# Patient Record
Sex: MALE | Race: WHITE
[De-identification: names, ages, dates, MRNs, and addresses within clinical notes are randomized per-mention and may not be internally consistent; named-entity substitution may affect disease eponyms.]

---

## 2019-01-30 ENCOUNTER — HOSPITAL ENCOUNTER (EMERGENCY)
Dept: HOSPITAL 97 - ER | Age: 79
Discharge: HOME | End: 2019-01-30
Payer: COMMERCIAL

## 2019-01-30 DIAGNOSIS — I10: ICD-10-CM

## 2019-01-30 DIAGNOSIS — Z79.82: ICD-10-CM

## 2019-01-30 DIAGNOSIS — R31.9: Primary | ICD-10-CM

## 2019-01-30 LAB
BLD SMEAR INTERP: (no result)
HCT VFR BLD CALC: 46 % (ref 39.6–49)
LYMPHOCYTES # SPEC AUTO: 1.2 K/UL (ref 0.7–4.9)
MACROCYTES BLD QL: (no result)
MORPHOLOGY BLD-IMP: (no result)
PMV BLD: 9.4 FL (ref 7.6–11.3)
RBC # BLD: 4.35 M/UL (ref 4.33–5.43)
UA COMPLETE W REFLEX CULTURE PNL UR: (no result)
UA DIPSTICK W REFLEX MICRO PNL UR: (no result)

## 2019-01-30 PROCEDURE — 85025 COMPLETE CBC W/AUTO DIFF WBC: CPT

## 2019-01-30 PROCEDURE — 81015 MICROSCOPIC EXAM OF URINE: CPT

## 2019-01-30 PROCEDURE — 99283 EMERGENCY DEPT VISIT LOW MDM: CPT

## 2019-01-30 PROCEDURE — 36415 COLL VENOUS BLD VENIPUNCTURE: CPT

## 2019-01-30 PROCEDURE — 87086 URINE CULTURE/COLONY COUNT: CPT

## 2019-01-30 PROCEDURE — 81003 URINALYSIS AUTO W/O SCOPE: CPT

## 2019-01-30 PROCEDURE — 87088 URINE BACTERIA CULTURE: CPT

## 2019-01-30 NOTE — XMS REPORT
Clinical Summary

 Created on:2019



Patient:Beverly Santacruz

Sex:Male

:1940

External Reference #:[id number]





Demographics







 Address  [address] 



   [place] [address]

 

 Home Phone  [phone]

 

 Email Address  [email or web address]

 

 Preferred Language  English

 

 Marital Status  

 

 Scientology Affiliation  Unknown

 

 Race  White

 

 Ethnic Group  Not  or 









Author







 Organization  [facility]

 

 Address  [address] [place] [address]









Support







 Name  Relationship  Address  Phone

 

 No,Contact  Unavailable  [address]   [phone]



     [place] [address]  









Care Team Providers







 Name  Role  Phone

 

 Mary Torres MD  Primary Care Provider  [phone]









Allergies

No Known Allergies



Medications







 Medication  Sig  Dispensed  Refills  Start Date  End Date  Status

 

 tamsulosin (FLOMAX)  Take 0.4 mg    0      Active



 0.4 mg  by mouth          



 capsule,extended  daily.          



 release 24hr            

 

 FOLIC  Take by mouth    0      Active



 ACID/MULTIVIT-MIN/L  daily.          



 UTEIN (CENTRUM            



 SILVER ORAL)            

 

 aspirin (ECOTRIN)  Take 81 mg by    0      Active



 81 MG enteric  mouth daily.          



 coated tablet            

 

 LACTULOSE ORAL  Take 30 mL by    0      Active



   mouth daily.          

 

 TRAVOPROST  Apply to eye    0      Active



 (TRAVATAN Z OPHT)  daily.          

 

 lactulose  GIVE 30 ML'S    3  2017    Active



 (CHRONULAC) 10  BY MOUTH          



 gram/15 mL solution  EVERY DAY          

 

 pantoprazole  Take 40 mg by    0  10/26/2017    Active



 (PROTONIX) 40 MG EC  mouth once          



 tablet  daily.          

 

 atorvastatin  TAKE 1 TABLET  90 tablet  3  2018    Active



 (LIPITOR) 40 MG  EVERY DAY          



 tabletIndications:            



 Pacemaker, Chronic            



 systolic congestive            



 heart failure (HCC)            

 

 clopidogrel  Take 1 tablet  90 tablet  0  10/31/2018    Active



 (PLAVIX) 75 mg  (75 mg total)          



 tabletIndications:  by mouth          



 Pacemaker, Chronic  daily.          



 systolic congestive            



 heart failure (HCC)            

 

 metoprolol  TAKE 1 TABLET  90 tablet  0  2018    Active



 succinate XL  BY MOUTH          



 (TOPROL-XL) 100 mg  EVERY DAY          



 24 hr            



 tabletIndications:            



 Essential            



 hypertension            

 

 niacin (NIASPAN)  Take 1 tablet  60 tablet  0  2018    Active



 1000 MG CR tablet  (1,000 mg          



   total) by          



   mouth 2 (two)          



   times a day.          

 

 niacin (NIASPAN)  TAKE 1 TABLET  60 tablet  0  2019    Active



 1000 MG CR tablet  BY MOUTH          



   TWICE A DAY          

 

 clopidogrel  Take 1 tablet  90 tablet  3  02/10/2017  02/15/2018  Discontinued



 (PLAVIX) 75 mg  (75 mg total)          



 tabletIndications:  by mouth once          



 Pacemaker, Chronic  daily.          



 systolic congestive            



 heart failure (HCC)            

 

 atorvastatin  Take 1 tablet  90 tablet  3  02/10/2017  2018  Discontinued



 (LIPITOR) 40 MG  (40 mg total)          



 tabletIndications:  by mouth once          



 Pacemaker, Chronic  daily.          



 systolic congestive            



 heart failure (HCC)            

 

 niacin (NIASPAN)  Take 1 tablet  180 tablet  0  2017  



 1000 MG CR  (1,000 mg          



 tabletIndications:  total) by          



 Essential  mouth 2 (two)          



 hypertension  times a day.          

 

 metoprolol  Take 1 tablet  90 tablet  3  2017  Discontinued



 succinate XL  (100 mg          



 (TOPROL XL) 100 mg  total) by          



 24 hr  mouth daily.          



 tabletIndications:            



 Essential            



 hypertension            

 

 clopidogrel  TAKE 1 TABLET  90 tablet  3  02/15/2018  10/31/2018  Discontinued



 (PLAVIX) 75 mg  EVERY DAY          



 tabletIndications:            



 Pacemaker, Chronic            



 systolic congestive            



 heart failure (HCC)            

 

 metoprolol  TAKE 1 TABLET  90 tablet  3  2018  Discontinued



 succinate XL  BY MOUTH          



 (TOPROL-XL) 100 mg  EVERY DAY          



 24 hr            



 tabletIndications:            



 Essential            



 hypertension            

 

 niacin (NIASPAN  Take 1 tablet  180 tablet  0  2018  10/04/2018  
Discontinued



 EXTENDED-RELEASE)  (1,000 mg          



 1000 MG CR tablet  total) by          



   mouth 2 (two)          



   times a day.          

 

 niacin (NIASPAN)  TAKE 1 TABLET  180 tablet  0  10/05/2018  10/09/2018  
Discontinued



 1000 MG CR tablet  BY MOUTH          



   TWICE A DAY          

 

 niacin (NIASPAN)  Take 1 tablet  60 tablet  0  10/09/2018  2018  
Discontinued



 1000 MG CR tablet  (1,000 mg          



   total) by          



   mouth 2 (two)          



   times a day.          







Active Problems







 Problem  Noted Date

 

 Chronic systolic congestive heart failure  2017

 

 Coronary artery disease involving native coronary artery of native heart  



 without angina pectoris  

 

 Stented coronary artery  2017

 

 AICD (automatic cardioverter/defibrillator) present  2017







Encounters







 Date  Type  Specialty  Care Team  Description

 

 2018  Refill  Cardiology  [place] Randhawa MD  Med Refill

 

 2018  Orders Only  Cardiology  Ivan Campbell, MA  

 

 2018  Refill  Cardiology  [place] Randhawa MD  Med Refill

 

 2018  Orders Only  Cardiology  Ivan aCmpbell, MA  Pacemaker;



         Chronic systolic congestive heart failure (HCC)

 

 10/31/2018  Orders Only  Cardiology  Ivan Campbell, MA  Pacemaker;



         Chronic systolic congestive heart failure (HCC)

 

 10/09/2018  Refill  Cardiology  Noemí Zambrano MA  Med Refill

 

 10/04/2018  Refill  Cardiology  [place] Randhawa MD  Med Refill

 

 10/02/2018  Orders Only  Cardiology  Ivan Campbell, MA  Essential hypertension

 

 2018  Orders Only  Cardiology  Noemí Zambrano MA  

 

 2018  Refill  Cardiology  [place] Randhawa MD  Med Refill

 

 2018  Refill  Cardiology  [place] Randhawa MD  Med Refill

 

 02/15/2018  Refill  Cardiology  [place] Randhawa MD  Med Refill



after 2018



Social History







 Tobacco Use  Types  Packs/Day  Years Used  Date

 

 Never Smoker        









 Alcohol Use  Drinks/Week  oz/Week  Comments

 

 Yes  1 Cans of beer  0.6  









 Sex Assigned at Birth  Date Recorded

 

 Not on file  









 Job Start Date  Occupation  Industry

 

 Not on file  Not on file  Not on file









 Travel History  Travel Start  Travel End









 No recent travel history available.







Last Filed Vital Signs

Not on file



Plan of Treatment







 Health Maintenance  Due Date  Last Done  Comments

 

 SHINGLES VACCINES (1 of 2)  1992    

 

 PNEUMOCOCCAL POLYSACCHARIDE VACCINE AGE 65 AND OVER  2007    

 

 PNEUMOCOCCAL-13  2007    

 

 INFLUENZA VACCINE  2018    







Results

Not on fileafter 2018



Insurance







 Payer  Benefit Plan / Group  Subscriber ID  Type  Phone  Address

 

 MEDICARE  MEDICARE PART A AND B  xxxxxxxxxx  Medicare [place], [place]

 

 AETNA  AETNA HMO,POS,EPO, MC/EC  xxxxxxxxx  HMO    









 Guarantor Name  Account Type  Relation to  Date of  Phone  Billing



     Patient  Birth    Address

 

 Beverly Santacruz  Personal/Family  Self  1940  [phone]  [address]        (Home)  [place] 







           [place] [address]







Advance Directives

Patient has advance care planning documents on file. For more information, 
please contact:[place] [address]

## 2019-01-30 NOTE — EDPHYS
Physician Documentation                                                                           

 [facility]                                                                

Name: Beverly Santacruz                                                                                 

Age: 79 yrs                                                                                       

Sex: Male                                                                                         

: 1940                                                                                   

MRN: [id number]                                                                                   

Arrival Date: 2019                                                                          

Time: 10:18                                                                                       

Account#: [id number]                                                                            

Bed 14                                                                                            

Private MD: Casi Donnelly, A                                                                    

ED Physician Chetan Collins                                                                    

HPI:                                                                                              

                                                                                             

12:39 This 79 yrs old  Male presents to ER via Ambulatory with complaints of BLOOD   ma2 

      IN URINE.                                                                                   

12:39 The patient presents with hematuria. Onset: The symptoms/episode began/occurred         ma2 

      gradually, 1 day(s) ago. Associated signs and symptoms: Pertinent negatives: abdominal      

      pain, constipation, diarrhea, fever, nausea. Severity of symptoms: At their worst the       

      symptoms were mild, in the emergency department the symptoms are unchanged. The patient     

      has not experienced similar symptoms in the past.                                           

                                                                                                  

Historical:                                                                                       

- Allergies:                                                                                      

10:36 No Known Allergies;                                                                     sv  

- Home Meds:                                                                                      

11:14 clopidogrel 75 mg oral tab 1 tab once daily [Active]; atorvastatin 40 mg oral tab 1 tab aj  

      once daily [Active]; metoprolol tartrate 100 mg Oral tab 1 tab once daily [Active];         

      tamsulosin 0.4 mg oral cp24 1 cap once daily [Active]; pantoprazole 40 mg oral TbEC 1       

      tab once daily [Active]; aspirin 81 mg Oral chew 1 tab once daily [Active]; Lactulose       

      Oral [Active];                                                                              

- PMHx:                                                                                           

10:36 Hypertension;                                                                           sv  

- PSHx:                                                                                           

10:36 Knee surgery; facial; back;                                                             sv  

                                                                                                  

- Immunization history:: Adult Immunizations up to date.                                          

- Social history:: Patient/guardian denies using alcohol, street drugs, The patient               

  lives with family, Smoking status: Patient/guardian denies using tobacco.                       

- Ebola Screening: : Patient negative for fever greater than or equal to 101.5 degrees            

  Fahrenheit, and additional compatible Ebola Virus Disease symptoms Patient denies               

  exposure to infectious person Patient denies travel to an Ebola-affected area in the            

  21 days before illness onset No symptoms or risks identified at this time.                      

- Family history:: not pertinent.                                                                 

- Hospitalizations: : No recent hospitalization is reported.                                      

                                                                                                  

                                                                                                  

ROS:                                                                                              

12:39 Constitutional: Negative for fever, chills, and weight loss.                            ma2 

12:39 : Positive for hematuria, Negative for urinary symptoms, small amounts, flank pain,       

      burning with urination, bladder incontinence, foul smelling urine.                          

12:39 All other systems are negative.                                                             

                                                                                                  

Exam:                                                                                             

12:39 Constitutional:  This is a well developed, well nourished patient who is awake, alert,  ma2 

      and in no acute distress. Chest/axilla:  Normal chest wall appearance and motion.           

      Nontender with no deformity.  No lesions are appreciated. Cardiovascular:  Regular rate     

      and rhythm with a normal S1 and S2.  No gallops, murmurs, or rubs.  Normal PMI, no JVD.     

       No pulse deficits. Respiratory:  Lungs have equal breath sounds bilaterally, clear to      

      auscultation and percussion.  No rales, rhonchi or wheezes noted.  No increased work of     

      breathing, no retractions or nasal flaring. Abdomen/GI:  Soft, non-tender, with normal      

      bowel sounds.  No distension or tympany.  No guarding or rebound.  No evidence of           

      tenderness throughout. Male :  Normal genitalia with no discharge or lesions. MS/         

      Extremity:  Pulses equal, no cyanosis.  Neurovascular intact.  Full, normal range of        

      motion. Neuro:  Awake and alert, GCS 15, oriented to person, place, time, and               

      situation.  Cranial nerves II-XII grossly intact.  Motor strength 5/5 in all                

      extremities.  Sensory grossly intact.  Cerebellar exam normal.  Normal gait.                

                                                                                                  

Vital Signs:                                                                                      

10:34  / 77; Pulse 69; Resp 18; Temp 98.3; Pulse Ox 98% ; Weight 73.48 kg; Height 5 ft. sv  

      8 in. (172.72 cm); Pain 0/10;                                                               

10:34 Body Mass Index 24.63 (73.48 kg, 172.72 cm)                                             sv  

                                                                                                  

MDM:                                                                                              

10:37 Patient medically screened.                                                             ma2 

12:39 Differential diagnosis: tim hematuria hb wnl no uti here called dr. donnelly office he   ma2 

      will see him tomorrow at 8 am. Data reviewed: vital signs, nurses notes. Counseling: I      

      had a detailed discussion with the patient and/or guardian regarding: the historical        

      points, exam findings, and any diagnostic results supporting the discharge/admit            

      diagnosis, the presence of at least one elevated blood pressure reading (>120/80)           

      during this emergency department visit, the need for outpatient follow up.                  

                                                                                                  

                                                                                             

10:38 Order name: CBC with Diff; Complete Time: 12:30                                         ma2 

                                                                                             

11:02 Order name: UA; Complete Time: 12:30                                                    dh3 

                                                                                             

11:38 Order name: CBC Smear Scan; Complete Time: 12:30                                        EDMS

                                                                                             

12:02 Order name: Urine Microscopic Only                                                      EDMS

                                                                                             

12:17 Order name: Urine Culture                                                               EDMS

                                                                                             

10:38 Order name: Urine Dipstick-Ancillary (obtain specimen); Complete Time: 11:06            ma2 

                                                                                                  

Administered Medications:                                                                         

No medications were administered                                                                  

                                                                                                  

                                                                                                  

Disposition:                                                                                      

19 12:42 Discharged to Home. Impression: Hematuria, unspecified.                            

- Condition is Stable.                                                                            

- Discharge Instructions: Hematuria, Adult.                                                       

                                                                                                  

- Medication Reconciliation Form, Thank You Letter, Antibiotic Education, Prescription            

  Opioid Use form.                                                                                

- Follow up: Casi Donnelly MD; When: Tomorrow; Reason: Continuance of care.                    

- Notes: see Dr. Donnelly at 8 am tomorrow                                                           

                                                                                                  

                                                                                                  

Signatures:                                                                                       

Dispatcher [employer]                           [facility]                                                 

Kacy Ho RN RN sv Myers, Amanda, RN RN aj Smirch, Shelby, RN RN                                                      

Chetan Collins MD MD   ma2                                                  

                                                                                                  

Corrections: (The following items were deleted from the chart)                                    

12:08 10:59 UA MICROSCOPIC+U.LAB.BRZ ordered. [facility]

13:08 12:42 2019 12:42 Discharged to Home. Impression: Hematuria, unspecified.          ss  

      Condition is Stable. Forms are Medication Reconciliation Form, Thank You Letter,            

      Antibiotic Education, Prescription Opioid Use. Follow up: Casi Donnelly; When:             

      Tomorrow; Reason: Continuance of care. ma2                                                  

                                                                                                  

**************************************************************************************************

## 2019-01-30 NOTE — ER
Nurse's Notes                                                                                     

 [facility]                                                                

Name: Beverly Santacruz                                                                                 

Age: 79 yrs                                                                                       

Sex: Male                                                                                         

: 1940                                                                                   

MRN: [id number]                                                                                   

Arrival Date: 2019                                                                          

Time: 10:18                                                                                       

Account#: [id number]                                                                            

Bed 14                                                                                            

Private MD: Casi Donnelly A                                                                    

Diagnosis: Hematuria, unspecified                                                                 

                                                                                                  

Presentation:                                                                                     

                                                                                             

10:32 Presenting complaint: Patient states: hematuria started today, also reports blood in    sv  

      stool, lightheaded. Transition of care: patient was not received from another setting       

      of care. Onset of symptoms was 2019. Care prior to arrival: None.               

10:32 Method Of Arrival: Ambulatory                                                           sv  

10:32 Acuity: NAOMI 3                                                                           sv  

11:15 Risk Assessment: Do you want to hurt yourself or someone else? Patient reports no       aj  

      desire to harm self or others. Initial Sepsis Screen: Does the patient meet any 2           

      criteria? No. Patient's initial sepsis screen is negative. Does the patient have a          

      suspected source of infection? No. Patient's initial sepsis screen is negative.             

                                                                                                  

Historical:                                                                                       

- Allergies:                                                                                      

10:36 No Known Allergies;                                                                     sv  

- Home Meds:                                                                                      

11:14 clopidogrel 75 mg oral tab 1 tab once daily [Active]; atorvastatin 40 mg oral tab 1 tab aj  

      once daily [Active]; metoprolol tartrate 100 mg Oral tab 1 tab once daily [Active];         

      tamsulosin 0.4 mg oral cp24 1 cap once daily [Active]; pantoprazole 40 mg oral TbEC 1       

      tab once daily [Active]; aspirin 81 mg Oral chew 1 tab once daily [Active]; Lactulose       

      Oral [Active];                                                                              

- PMHx:                                                                                           

10:36 Hypertension;                                                                           sv  

- PSHx:                                                                                           

10:36 Knee surgery; facial; back;                                                             sv  

                                                                                                  

- Immunization history:: Adult Immunizations up to date.                                          

- Social history:: Patient/guardian denies using alcohol, street drugs, The patient               

  lives with family, Smoking status: Patient/guardian denies using tobacco.                       

- Ebola Screening: : Patient negative for fever greater than or equal to 101.5 degrees            

  Fahrenheit, and additional compatible Ebola Virus Disease symptoms Patient denies               

  exposure to infectious person Patient denies travel to an Ebola-affected area in the            

  21 days before illness onset No symptoms or risks identified at this time.                      

- Family history:: not pertinent.                                                                 

- Hospitalizations: : No recent hospitalization is reported.                                      

                                                                                                  

                                                                                                  

Screening:                                                                                        

10:49 Abuse screen: Denies threats or abuse. Denies injuries from another. Nutritional        aj  

      screening: No deficits noted. Tuberculosis screening: No symptoms or risk factors           

      identified. Fall Risk None identified.                                                      

                                                                                                  

Assessment:                                                                                       

10:49 General: Appears in no apparent distress. comfortable, Behavior is calm, cooperative,   aj  

      appropriate for age. Pain: Denies pain. Neuro: Level of Consciousness is awake, alert,      

      obeys commands, Oriented to person, place, time, situation, Appropriate for age.            

      Respiratory: Airway is patent Respiratory effort is even, unlabored, Respiratory            

      pattern is regular, symmetrical. GI: Reports bloody stool. : Reports blood in urine.      

      Derm: Skin is intact, is healthy with good turgor, Skin is pink, warm \T\ dry. normal.      

                                                                                                  

Vital Signs:                                                                                      

10:34  / 77; Pulse 69; Resp 18; Temp 98.3; Pulse Ox 98% ; Weight 73.48 kg; Height 5 ft. sv  

      8 in. (172.72 cm); Pain 0/10;                                                               

10:34 Body Mass Index 24.63 (73.48 kg, 172.72 cm)                                             sv  

                                                                                                  

ED Course:                                                                                        

10:18 Patient arrived in ED.                                                                  sb2 

10:19 Casi Donnelly MD is Private Physician.                                               sb2 

10:34 Triage completed.                                                                       sv  

10:36 Arm band placed on.                                                                     sv  

10:37 Chetan Collins MD is Attending Physician.                                           ma2 

10:44 Mary Benson, RN is Primary Nurse.                                                     aj  

10:49 Patient has correct armband on for positive identification. Bed in low position. Call   aj  

      light in reach.                                                                             

10:49 Inserted saline lock: 22 gauge in left antecubital area, using aseptic technique. Blood aj  

      collected. Missed attempt(s): 20 gauge in left forearm. Bleeding controlled, band aid       

      applied, catheter tip intact.                                                               

11:05 CBC with Diff Sent.                                                                     aj  

11:06 UA Sent.                                                                                aj  

12:41 Casi Donnelly MD is Referral Physician.                                              ma2 

13:06 No provider procedures requiring assistance completed. IV discontinued, intact,         ss  

      bleeding controlled, No redness/swelling at site. Pressure dressing applied.                

                                                                                                  

Administered Medications:                                                                         

No medications were administered                                                                  

                                                                                                  

                                                                                                  

Outcome:                                                                                          

10:49 Discharged to home                                                                      aj  

12:42 Discharge ordered by MD.                                                                ma2 

13:06 Condition: good                                                                         ss  

13:06 Discharge instructions given to patient, Instructed on discharge instructions, follow       

      up and referral plans. Demonstrated understanding of instructions, follow-up care.          

13:08 Patient left the ED.                                                                      

                                                                                                  

Signatures:                                                                                       

Kacy Ho RN RN sv Myers, Amanda, RN RN aj Smirch, Shelby, RN RN ss Alzahri, Mohammad, MD MD   ma2                                                  

Sayra Gustafson2                                                  

                                                                                                  

Corrections: (The following items were deleted from the chart)                                    

10:36 10:32 Presenting complaint: Patient states: hematuria started today, also reports blood sv  

      in stool. sv                                                                                

10:36 10:34  / 77; Pulse 69bpm; Resp 18bpm; Pulse Ox 98%; Temp 98.3F; sv                sv  

12:08 11:05 UA MICROSCOPIC+U.LAB.BRZ drawn and sent. rickie                                       EDMS

                                                                                                  

**************************************************************************************************

## 2021-02-22 ENCOUNTER — HOSPITAL ENCOUNTER (EMERGENCY)
Dept: HOSPITAL 97 - ER | Age: 81
Discharge: HOME | End: 2021-02-22
Payer: COMMERCIAL

## 2021-02-22 VITALS — DIASTOLIC BLOOD PRESSURE: 59 MMHG | SYSTOLIC BLOOD PRESSURE: 130 MMHG

## 2021-02-22 VITALS — OXYGEN SATURATION: 98 % | TEMPERATURE: 98.7 F

## 2021-02-22 DIAGNOSIS — R31.9: Primary | ICD-10-CM

## 2021-02-22 DIAGNOSIS — Z85.51: ICD-10-CM

## 2021-02-22 DIAGNOSIS — Z95.818: ICD-10-CM

## 2021-02-22 DIAGNOSIS — I10: ICD-10-CM

## 2021-02-22 DIAGNOSIS — Z79.82: ICD-10-CM

## 2021-02-22 DIAGNOSIS — R53.1: ICD-10-CM

## 2021-02-22 LAB
ALBUMIN SERPL BCP-MCNC: 3.2 G/DL (ref 3.4–5)
ALP SERPL-CCNC: 52 U/L (ref 45–117)
ALT SERPL W P-5'-P-CCNC: 33 U/L (ref 12–78)
AST SERPL W P-5'-P-CCNC: 22 U/L (ref 15–37)
BUN BLD-MCNC: 6 MG/DL (ref 7–18)
GLUCOSE SERPLBLD-MCNC: 93 MG/DL (ref 74–106)
HCT VFR BLD CALC: 37.2 % (ref 39.6–49)
INR BLD: 1.05
LYMPHOCYTES # SPEC AUTO: 0.7 K/UL (ref 0.7–4.9)
MAGNESIUM SERPL-MCNC: 1.6 MG/DL (ref 1.8–2.4)
NT-PROBNP SERPL-MCNC: 760 PG/ML (ref ?–450)
PMV BLD: 8.6 FL (ref 7.6–11.3)
POTASSIUM SERPL-SCNC: 3.5 MMOL/L (ref 3.5–5.1)
RBC # BLD: 3.89 M/UL (ref 4.33–5.43)
TROPONIN (EMERG DEPT USE ONLY): < 0.02 NG/ML (ref 0–0.04)

## 2021-02-22 PROCEDURE — 76377 3D RENDER W/INTRP POSTPROCES: CPT

## 2021-02-22 PROCEDURE — 80048 BASIC METABOLIC PNL TOTAL CA: CPT

## 2021-02-22 PROCEDURE — 93005 ELECTROCARDIOGRAM TRACING: CPT

## 2021-02-22 PROCEDURE — 83735 ASSAY OF MAGNESIUM: CPT

## 2021-02-22 PROCEDURE — 96368 THER/DIAG CONCURRENT INF: CPT

## 2021-02-22 PROCEDURE — 99285 EMERGENCY DEPT VISIT HI MDM: CPT

## 2021-02-22 PROCEDURE — 74176 CT ABD & PELVIS W/O CONTRAST: CPT

## 2021-02-22 PROCEDURE — 83880 ASSAY OF NATRIURETIC PEPTIDE: CPT

## 2021-02-22 PROCEDURE — 81003 URINALYSIS AUTO W/O SCOPE: CPT

## 2021-02-22 PROCEDURE — 85730 THROMBOPLASTIN TIME PARTIAL: CPT

## 2021-02-22 PROCEDURE — 85025 COMPLETE CBC W/AUTO DIFF WBC: CPT

## 2021-02-22 PROCEDURE — 84484 ASSAY OF TROPONIN QUANT: CPT

## 2021-02-22 PROCEDURE — 36415 COLL VENOUS BLD VENIPUNCTURE: CPT

## 2021-02-22 PROCEDURE — 85610 PROTHROMBIN TIME: CPT

## 2021-02-22 PROCEDURE — 84145 PROCALCITONIN (PCT): CPT

## 2021-02-22 PROCEDURE — 96365 THER/PROPH/DIAG IV INF INIT: CPT

## 2021-02-22 PROCEDURE — 96367 TX/PROPH/DG ADDL SEQ IV INF: CPT

## 2021-02-22 PROCEDURE — 81015 MICROSCOPIC EXAM OF URINE: CPT

## 2021-02-22 PROCEDURE — 80076 HEPATIC FUNCTION PANEL: CPT

## 2021-02-22 NOTE — XMS REPORT
Clinical Summary

                          Created on:2021



Patient:Beverly Santacruz

Sex:Male

:1940

External Reference #:[id number]





Demographics







                          Address                   [address] 



                                                    [place] [address]

 

                          Home Phone                [phone]

 

                          Mobile Phone              [phone]

 

                          Email Address             [email or web address]

 

                          Email Address             [email or web address]

 

                          Preferred Language        English

 

                          Marital Status            

 

                          Worship Affiliation     Unknown

 

                          Race                      White

 

                          Ethnic Group              Not  or 









Author







                          Organization              [facility]

 

                          Address                   [address] [place] [address]









Support







                Name            Relationship    Address         Phone

 

                Andre Santacruz     Child           [address]  [phone]





                                                [place] [address] 









Care Team Providers







                    Name                Role                Phone

 

                    Lidia Torres MD   Primary Care Provider [phone]









Allergies

No Known Active Allergies



Medications







          Medication Sig       Dispensed Refills   Start Date End Date  Status

 

          tamsulosin Take 0.4 mg by           0                             Acti

ve



          (FLOMAX) 0.4 mg mouth daily.                                         



          capsule,extended                                                   



          release 24hr                                                   

 

          FOLIC     Take 1 tablet           0                             Active



          ACID/MULTIVIT-MIN by mouth daily.                                     

    



          /LUTEIN (CENTRUM                                                   



          SILVER ORAL)                                                   

 

          TRAVOPROST Administer 1           0                             Active



          (TRAVATAN Z OPHT) drop to both                                        

 



                    eyes nightly.                                         



                    Both eyes                                         

 

          lactulose Take 30 g by           3         2017           Active



          (CHRONULAC) 10 mouth nightly.                                         



          gram/15 mL                                                   



          solution                                                    

 

          pantoprazole Take 40 mg by           0         10/26/2017           Ac

tive



          (PROTONIX) 40 MG mouth daily.                                         



          EC tablet                                                   

 

          aspirin (ECOTRIN) Take 1 tablet 30 tablet 11        10/07/2020 10/02/2

0  Active



          81 MG enteric (81 mg total)                               21        



          coated tablet by mouth every                                         



                    evening for 360                                         



                    days.                                             

 

          atorvastatin Take 1 tablet 30 tablet 11        10/07/2020 10/02/20  Ac

tive



          (LIPITOR) 40 mg (40 mg total)                               21        



          tabletIndications by mouth daily                                      

   



          : Pacemaker, for 360 days.                                         



          Chronic systolic                                                   



          congestive heart                                                   



          failure (HCC)                                                   

 

          clopidogreL Take 1 tablet 30 tablet 11        10/07/2020 10/02/20  Act

james



          (PLAVIX) 75 mg (75 mg total)                               21        



          tabletIndications by mouth daily                                      

   



          : Pacemaker, for 360 days.                                         



          Chronic systolic                                                   



          congestive heart                                                   



          failure (HCC)                                                   

 

          metoprolol TAKE 1 TABLET 90 tablet 0         2020           Acti

ve



          succinate XL BY MOUTH EVERY                                         



          (TOPROL-XL) 100 DAY                                               



          mg 24 hr                                                    



          tabletIndications                                                   



          : Essential                                                   



          hypertension                                                   

 

          aspirin (ECOTRIN) Take 81 mg by           0                   10/07/20

  Discontinued



          81 MG enteric mouth every                               20        (Reo

rder)



          coated tablet evening.                                          

 

          LACTULOSE ORAL Take 30 mL by           0                   20  D

iscontinued



                    mouth daily.                               20        

 

          niacin (NIASPAN) Take 1 tablet 60 tablet 0         2018 10/05/20

  Discontinued



          1000 MG CR tablet (1,000 mg                               20        



                    total) by mouth                                         



                    2 (two) times a                                         



                    day.                                              

 

          atorvastatin TAKE 1 TABLET 90 tablet 3         2019  Di

scontinued



          (LIPITOR) 40 MG BY MOUTH EVERY                               20       

 (Reorder)



          tabletIndications DAY                                               



          : Pacemaker,                                                   



          Chronic systolic                                                   



          congestive heart                                                   



          failure (HCC)                                                   

 

          clopidogrel TAKE 1 TABLET 90 tablet 0         2019  Dis

continued



          (PLAVIX) 75 mg BY MOUTH EVERY                               20        

(Reorder)



          tabletIndications DAY                                               



          : Pacemaker,                                                   



          Chronic systolic                                                   



          congestive heart                                                   



          failure (HCC)                                                   

 

          niacin (NIASPAN) TAKE 1 TABLET 180 tablet 0         2020

0  Discontinued



          1000 MG CR tablet BY MOUTH TWICE                               20     

   



                    A DAY                                             

 

          metoprolol Take 1 tablet 90 tablet 0         02/10/2020 05/04/20  Disc

ontinued



          succinate XL (100 mg total)                               20        



          (TOPROL-XL) 100 by mouth daily.                                       

  



          mg 24 hr                                                    



          tabletIndications                                                   



          : Essential                                                   



          hypertension                                                   

 

          atorvastatin Take 1 tablet 90 tablet 3         2020 10/07/20  Di

scontinued



          (LIPITOR) 40 MG (40 mg total)                               20        

(Reorder)



          tabletIndications by mouth daily.                                     

    



          : Pacemaker,                                                   



          Chronic systolic                                                   



          congestive heart                                                   



          failure (HCC)                                                   

 

          clopidogreL Take 1 tablet 90 tablet 0         2020  Dis

continued



          (PLAVIX) 75 mg (75 mg total)                               20        



          tabletIndications by mouth daily.                                     

    



          : Pacemaker,                                                   



          Chronic systolic                                                   



          congestive heart                                                   



          failure (HCC)                                                   

 

          niacin (NIASPAN) TAKE 1 TABLET 180 tablet 0         2020

0  Discontinued



          1000 MG CR tablet BY MOUTH TWICE                               20     

   



                    A DAY                                             

 

          metoprolol TAKE 1 TABLET 90 tablet 0         2020  Disc

ontinued



          succinate XL BY MOUTH EVERY                               20        



          (TOPROL-XL) 100 DAY                                               



          mg 24 hr                                                    



          tabletIndications                                                   



          : Essential                                                   



          hypertension                                                   

 

          niacin (NIASPAN) TAKE 1 TABLET 180 tablet 0         2020

0  Discontinued



          1000 MG CR tablet BY MOUTH TWICE                               20     

   



                    A DAY                                             

 

          clopidogreL TAKE 1 TABLET 90 tablet 0         2020 10/05/20  Dis

continued



          (PLAVIX) 75 mg BY MOUTH EVERY                               20        



          tabletIndications DAY                                               



          : Pacemaker,                                                   



          Chronic systolic                                                   



          congestive heart                                                   



          failure (HCC)                                                   

 

          metoprolol TAKE 1 TABLET 90 tablet 0         2020 10/19/20  Disc

ontinued



          succinate XL BY MOUTH EVERY                               20        



          (TOPROL-XL) 100 DAY                                               



          mg 24 hr                                                    



          tabletIndications                                                   



          : Essential                                                   



          hypertension                                                   

 

          clopidogreL TAKE 1 TABLET 90 tablet 0         10/05/2020 10/07/20  Dis

continued



          (PLAVIX) 75 mg BY MOUTH EVERY                               20        

(Reorder)



          tabletIndications DAY                                               



          : Pacemaker,                                                   



          Chronic systolic                                                   



          congestive heart                                                   



          failure (HCC)                                                   

 

          niacin (NIASPAN) Take 2 tablets 180 tablet 0         10/05/2020 10/19/

20  Discontinued



          1000 MG CR tablet (2,000 mg                               20        



                    total) by mouth                                         



                    nightly.                                          

 

          niacin (NIASPAN) TAKE 2 TABLETS 180 tablet 0         10/19/2020 11/10/

20  Discontinued



          1000 MG CR tablet (2,000 MG                               20        (F

ormulary



                    TOTAL) BY MOUTH                                         prasad

ge)



                    NIGHTLY.                                          

 

          metoprolol Take 1 tablet 90 tablet 0         10/19/2020 11/06/20  Disc

ontinued



          succinate XL (100 mg total)                               20        



          (TOPROL-XL) 100 by mouth daily.                                       

  



          mg 24 hr                                                    



          tabletIndications                                                   



          : Essential                                                   



          hypertension                                                   







Active Problems







                          Problem                   Noted Date

 

                          Cardiomyopathy            2020

 

                          Ischemic cardiomyopathy   10/08/2019

 

                          Chronic systolic congestive heart failure 2017

 

                          CAD in native artery      2017

 

                          Stented coronary artery   2017

 

                          AICD (automatic cardioverter/defibrillator) present 







Encounters







             Date         Type         Specialty    Care Team    Description

 

             11/10/2020   Office Visit Cardiology   Venancio Garcia, CAD in park

ve artery 

(Primary Dx);



                                                    MD           Stented coronar

y artery;



                                                                 Ischemic cardio

myopathy;



                                                                 AICD (automatic

 cardioverter/defibrillator) present

 

             11/10/2020   Travel                                 

 

             2020   Refill       Cardiology   Venancio Garcia, Med Refill



                                                    MD           

 

             10/17/2020   Refill       Cardiology   Venancio Garcia Med Refill



                                                    MD           

 

             10/07/2020   Surgery      Procedural   Venancio Garcia, Selective c

oronary



                                       Cardiology   MD           angiogram [9345

8



                                                                 (CPT)]

 

             10/07/2020   Hospital Encounter Procedural   Venancio Garcia, Stent

ed coronary 

artery (Primary Dx);



                                       Cardiology   MD           CAD in native a

rtery;



                                                                 Cardiomyopathy,

 unspecified type (HCC);



                                                                 Chronic systoli

c congestive heart failure (HCC);



                                                                 Pacemaker

 

             10/07/2020   Travel                                 

 

             10/06/2020   Orders Only  Cardiology   Ivan Campbell MA CAD in native 

artery (Primary 

Dx);



                                                                 Stented coronar

y artery;



                                                                 Cardiomyopathy,

 unspecified type (HCC);



                                                                 Pre-operative l

aboratory examination

 

             10/05/2020   Documentation Medical Records Provider,    



                                                    Unknown      

 

             10/05/2020   Refill       Cardiology   Venancio Garcia Med Refill



                                                    MD           

 

             2020   Orders Only  Cardiology   Ivan Campbell MA CAD in native 

artery (Primary 

Dx);



                                                                 Cardiomyopathy,

 unspecified type (HCC);



                                                                 Chronic systoli

c congestive heart failure (HCC)

 

             2020   Orders Only  Cardiology   Ivan Campbell MA CAD in native 

artery (Primary 

Dx);



                                                                 Cardiomyopathy,

 unspecified type (HCC);



                                                                 Stented coronar

y artery;



                                                                 Exposure to PRANAV

S-associated coronavirus

 

             2020   Travel                                 

 

             2020   Refill       Cardiology   Venancio Garcia Med Refill



                                                    MD           

 

             2020   Travel                                 

 

             2020   Travel                                 

 

             2020   Refill       Cardiology   Venancio Garcia Med Refill



                                                    MD           

 

             2020   Refill       Cardiology   Venancio Garcia Med Refill



                                                    MD           

 

             2020   Office Visit Cardiology   Venancio Garcia, CAD in park

ve artery 

(Primary Dx);



                                                    MD           Cardiomyopathy,

 unspecified type (HCC);



                                                                 Ischemic cardio

myopathy;



                                                                 AICD (automatic

 cardioverter/defibrillator) present

 

             2020   Travel                                 

 

             2020   Travel                                 

 

             2020   Refill       Cardiology   Venancio Garcia Med Refill



                                                    MD           

 

             2020   Refill       Cardiology   Venancio Garcia Med Refill



                                                    MD           

 

             2020   Refill       Cardiology   Juan Manuel,   Med Refill



                                                    Noemí, MA    

 

             2020   Refill       Cardiology   Juan Manuel   Med Refill



                                                    Noemí MA    



after 2020



Surgical History







                Surgery         Date            Site/Laterality Comments

 

                STENT                                           two stents

 

                INSERT / REPLACE / REMOVE                                 



                PACEMAKER                                       

 

                X-STOP IMPLANTATION                                 

 

                OTHER SURGICAL HISTORY                                 Metal [place]bert

brian face

 

                OTHER SURGICAL HISTORY                                 Lower Christina

k Surgery

 

                OTHER SURGICAL HISTORY                                 Right

 

                CARDIAC CATHETERIZATION 10/7/2020       N/A             Procedur

e: Selective



                                                                coronary angiogr

am;



                                                                Surgeon: MARY KATE Garcia MD;  Location: [facility] WT Cath



                                                                Lab Invasive Loc

ation;



                                                                Service: Cardiol

ogy;



                                                                Laterality: N/A;

  LH CATH



                                                                POSS PCI







                                                                Medical devices 

from this



                                                                surgery are in t

he



                                                                Implants section

.

 

                CARDIAC CATHETERIZATION 10/7/2020       N/A             Procedur

e: PCI stent;



                                                                Surgeon: MARY KATE Garcia MD;  Location: [facility] WT Cath



                                                                Lab Invasive Loc

ation;



                                                                Service: Cardiol

ogy;



                                                                Laterality: N/A;

  RCA



                                                                stent x1







                                                                Medical devices 

from this



                                                                surgery are in t

he



                                                                Implants section

.







Medical History







                    Medical History     Date                Comments

 

                    Hypertension                            

 

                    CHF (congestive heart failure) ([facility])                     







Social History







             Tobacco Use  Types        Packs/Day    Years Used   Date

 

             Never Smoker                                        









                Smokeless Tobacco: Never Used                                 









                Alcohol Use     Drinks/Week     oz/Week         Comments

 

                Yes             1 Cans of beer  1.0             









                          Sex Assigned at Birth     Date Recorded

 

                          Not on file               







Last Filed Vital Signs







                Vital Sign      Reading         Time Taken      Comments

 

                Blood Pressure  148/70          11/10/2020 11:14 AM CST 

 

                Pulse           75              11/10/2020 11:14 AM CST 

 

                Temperature     36.4 C (97.6 F) 10/07/2020  2:20 PM CDT 

 

                Respiratory Rate 25              10/07/2020  7:00 PM CDT 

 

                Oxygen Saturation 98%             10/07/2020  7:00 PM CDT 

 

                Inhaled Oxygen Concentration -               -               

 

                Weight          73.5 kg (162 lb) 11/10/2020 11:14 AM CST 

 

                Height          172.7 cm (5' 8") 11/10/2020 11:14 AM CST 

 

                Body Mass Index 24.63           11/10/2020 11:14 AM CST 







Plan of Treatment







             Date         Type         Specialty    Care Team    Description

 

                2021      Appointment     Procedural Cardiology Alyssa Garcia MD



                                        



                                                                [address]

et



                                        



                                                                [address]



                                        



                                                                [place] [address]

0



                                        



                                                                [phone] (Fax) 

 

                2021      Office Visit    Cardiology      Venancio Garcia MD



                                        



                                                                [address]

et



                                        



                                                                [address]



                                        



                                                                [place] [address]

0



                                        



                                                                [phone] (Fax) 









                Health Maintenance Due Date        Last Done       Comments

 

                COVID-19 VACCINE (1 of 2) 1958                      

 

                HEPATITIS C SCREENING 1960                      

 

                SHINGLES VACCINES (#1) 1992                      

 

                65+ PNEUMOCOCCAL VACCINE (1 of 1 - PPSV23) 2007           

           

 

                INFLUENZA VACCINE Completed       10/06/2020      







Implants







          Implanted Type      Area       Device    Shelf     Model /



                                                  Identifier Expiration Serial /



                                                            Date      Lot

 

                    Stent System 4.0 X 18mm Resolute [employer] Coronary - [id number]

42 Coronary            N/A: N/A

                [employer] -                                 [id number] /



          Implanted: Qty: 1 on 10/07/2020 at [facility] Stents              [place]

RDIAC RYHTYM                      /



                                        MGMT                          







Procedures







             Procedure Name Priority     Date/Time    Associated   Comments



                                                    Diagnosis    

 

             ECG PRE/POST OP Routine      10/07/2020  3:17              Results 

for this



                                       PM CDT                    procedure are i

n



                                                                 the results



                                                                 section.

 

             CV PCI STENT Routine      10/07/2020  1:50 CAD in native Results fo

r this



                                                PM CDT          artery



                                        procedure are in



                                                    Cardiomyopathy, the results



                                                    unspecified type section.



                                                                ([facility])



                                        



                                                    Chronic systolic 



                                                    congestive heart 



                                                    failure ([facility]) 

 

             CV LEFT HEART CATH LV Routine      10/07/2020  1:50 CAD in native R

esults for this



                GRAM WITH CORS                  PM CDT          artery



                                        procedure are in



                                                    Cardiomyopathy, the results



                                                    unspecified type section.



                                                                ([facility])



                                        



                                                    Chronic systolic 



                                                    congestive heart 



                                                    failure ([facility]) 

 

             ACTIVATED CLOTTING Routine      10/07/2020  1:43              Resul

ts for this



             TIME                      PM CDT                    procedure are i

n



                                                                 the results



                                                                 section.

 

             ACTIVATED CLOTTING Routine      10/07/2020  1:41              Resul

ts for this



             TIME                      PM CDT                    procedure are i

n



                                                                 the results



                                                                 section.

 

             POC PANEL    Routine      10/07/2020 11:26              Results for

 this



                                       AM CDT                    procedure are i

n



                                                                 the results



                                                                 section.

 

             ESTIMATED GFR Routine      10/07/2020 11:26              Results fo

r this



                                       AM CDT                    procedure are i

n



                                                                 the results



                                                                 section.

 

             HC COMPLETE BLD COUNT STAT         10/07/2020 11:20              Re

sults for this



             W/AUTO DIFF               AM CDT                    procedure are i

n



                                                                 the results



                                                                 section.

 

             ECG 12-LEAD  STAT         10/07/2020 10:41              Results for

 this



                                       AM CDT                    procedure are i

n



                                                                 the results



                                                                 section.

 

             NM MYOCARDIAL Routine      2020  2:10 CAD in native Results f

or this



                PERFUSION REST STRESS                 PM CDT          artery



                                        procedure are in



             1 DAY                                  Cardiomyopathy, the results



                                                    unspecified type section.



                                                    ([facility])        

 

             CV STRESS TEST NUCLEAR Routine      2020  2:10 CAD in native 

Results for this



                CARDIO                          PM CDT          artery



                                        procedure are in



                                                    Cardiomyopathy, the results



                                                    unspecified type section.



                                                    ([facility])        

 

             TTE COMPLETE, W Routine      2020  2:13 CAD in native Results

 for this



                CONTRAST, W DOPPLER                 PM CDT          artery



                                        procedure are in



             ()                                Cardiomyopathy, the results



                                                    unspecified type section.



                                                    ([facility])        

 

             COPY RECEIVED FROM: Routine      2020 10:00              Resu

lts for this



                                       AM CDT                    procedure are i

n



                                                                 the results



                                                                 section.

 

             NON HDL CHOLESTEROL Routine      2020 10:00              Resu

lts for this



             (REFLEX QUEST)              AM CDT                    procedure are

 in



                                                                 the results



                                                                 section.

 

             CHOL/HDLC RATIO Routine      2020 10:00              Results 

for this



             (REFLEX QUEST)              AM CDT                    procedure are

 in



                                                                 the results



                                                                 section.

 

             LDL-CHOLESTEROL Routine      2020 10:00              Results 

for this



             (REFLEX QUEST)              AM CDT                    procedure are

 in



                                                                 the results



                                                                 section.

 

             TRIGLYCERIDES Routine      2020 10:00              Results fo

r this



                                       AM CDT                    procedure are i

n



                                                                 the results



                                                                 section.

 

             HDL CHOLESTEROL Routine      2020 10:00              Results 

for this



                                       AM CDT                    procedure are i

n



                                                                 the results



                                                                 section.

 

             CHOLESTEROL  Routine      2020 10:00              Results for

 this



                                       AM CDT                    procedure are i

n



                                                                 the results



                                                                 section.

 

             COPY(IES) SENT TO: Routine      2020 10:00              Resul

ts for this



                                       AM CDT                    procedure are i

n



                                                                 the results



                                                                 section.

 

             AST (SGOT)   Routine      2020 10:00 CAD in native Results fo

r this



                                                AM CDT          artery



                                        procedure are in



                                                    Cardiomyopathy, the results



                                                    unspecified type section.



                                                    ([facility])        

 

             LIPID PANEL  Routine      2020 10:00 CAD in native Results fo

r this



                                                AM CDT          artery



                                        procedure are in



                                                    Cardiomyopathy, the results



                                                    unspecified type section.



                                                    ([facility])        



after 2020



Results

ECG Pre/Post Op (10/07/2020  3:17 PM CDT)





             Component    Value        Ref Range    Performed At Pathologist Sig

nature

 

             Ventricular rate 62                        [facility]     

 

             Atrial rate  62                        [facility]     

 

             IL interval  166                       [facility]     

 

             QRSD interval 88                        [facility]     

 

             QT interval  402                       [facility]     

 

             QTC interval 408                       [facility]     

 

             P axis 1     69                        [facility]     

 

             QRS axis 1   43                        [facility]     

 

             T wave axis  95                        [facility]     

 

                          EKG impression            Normal sinus rhythm-Septal i

nfarct (cited on or before 

10-FEB-2017)-T wave abnormality, consider anterior ischemia-Abnormal ECG-In 
automated comparison with ECG of 07-OCT-2020 10:41,-premature atrial co         

                  [facility] 

[facility]                                    



                          mplexes are no longer present-Electronically Signed   

                        



                          By Aidee SNIDER, Donita ([id number]) on 10/8/2020 6:47:40 PM  

                         



                                                                 









                                        Specimen

 

                                        









                          Narrative                 Performed At

 

                                        This result has an attachment that is no

t available.



                                        









                Performing Organization Address         City/State/ZIP Code [facility]

e Number

 

                [facility] M[facility]        [address] [place] [address] 



Cath lab procedure (10/07/2020  1:50 PM CDT)





                                        Specimen

 

                                        









                          Narrative                 Performed At

 

                                        This result has an attachment that is no

t available.



PROCEDURE DETAILS                       [facility]



                                                    



                          Attending: Dr. Venancio Garcia MD 



                                                    



                          Interventional Fellow: Laina Tubbs 



                                                    



                          After obtaining informed consent, the patient was brou

ght to the cardiac 



                          catheterization suite.    



                          The right radial artery was located, skin was infiltra

rosalba was lidocaine. 



                          The artery was accessed using the Seldinger technique,

 and a 6F 



                          glidesheath was inserted. NTG and verapamil were admin

istered 



                          intra-arterially via sheath. Heparin was administered 

intravenously. A 



                          JL3.5 and JR 4 catheters were sequentially was advance

d over a "baby-J" 



                          wire and selective coronary angiography was performed,

 standard views were 



                          obtained.                 



                                                  



                          FINDINGS:                 



                          LM: <30% stenosis         



                          LAD: <30% ISR of mid LAD stent, mild diffuse disease 



                          LCx: non-dominant, mild diffuse disease 



                          RCA: dominant, 70% ostial stenosis 



                                                    



                          Based on the diagnostic angiogram findings decision wa

s made to proceed 



                          with intervention on the RCA 



                                                    



                          The RCA was engaged with the FR4 with SH Guide Cathete

r and a Mukund Blue 



                          coronary wire was advanced into the distal PDA. The le

mukund in the ostial 



                          and proximal RCA was pre-dilated with a 3.5x12 mm semi

-compliant balloon 



                          and stented with a 4.0x18mm Resolute Karel FELY. The pro

ximal end of the 



                          stent is a couple struts into the aorta. Post-dilation

 with 4.5x12 mm NC 



                          balloon to flare the ostium. Final angiography reveale

d no evidence of 



                          dissection or perforation; there was YISSEL 3 flow and 0

% residual stenosis. 



                                                    



                          HEMOSTASIS: TR Band inflated to 15 cc of air 



                                                    



                          EQUIPMENT/ANTICOAGULATION: 



                          Right Radial Artery       



                          6F Sheath                 



                          FR4 Guide Catheter      



                          Mukund Blue coronary wire   



                                                    



                          Anticoagulation: Angiomax bolus and Infusion with AC

T >250 sec prior to 



                          procedure                 



                                                    



                          CONCLUSION:               



                          - 4.0x18mm Resolute Karel FELY to ostial RCA 



                                                    



                          PLAN:                     



                          1. ASA 81mg daily         



                          2. Clopidogrel 75mg daily 



                          3. Continue statin        



                          4. Cardiac medical therapy and aggressive risk factor 

modification. 



                          Cardiac rehabilitation.   



                          5. Transferred in stable condition 



                          6. TR band per protocol   



                          7. ICD interrogation today then discharge home 



                                                    



                          ANESTHESIA                



                          Under my supervision, 4 mg of versed and 50 mcg of fen

tanyl were 



                          administered intravenously for moderate sedation. 



                          Pulse oxymetry, heart rate and blood pressure were con

tinuously measured 



                          by an independent trained observer present. 



                          I spent 60 minutes of face to face attendance with the

 patient during 



                          sedation.                 









                Performing Organization Address         City/[facility]/ZIP Code Phon

e Number

 

                 [facility]        [address] [place] [id number],  



Activated clotting time (10/07/2020  1:43 PM CDT)Only the most recent of2 
resultswithin the time period is included.





             Component    Value        Ref Range    Performed At Pathologist



                                                                 [facility]

 

             Activated clotting 750 (H)      96 - 152 sec [facility]         Comment:                  HOSPITAL     



                           Name: Obed Devlin                           



                          Device ID: [id number]                           



                                                                 









                                        Specimen

 

                                        









                Performing Organization Address         City/[facility]/ZIP Code Phon

e Number

 

                [facility] DEPARTMENT OF PATHOLOGY AND [address] [place], [place] [address]

0 



                [address] [address] 



Estimated GFR (10/07/2020 11:26 AM CDT)





             Component    Value        Ref Range    Performed At Pathologist



                                                                 Signature

 

             Estimated GFR 85           mL/min/1.73  [facility] 



                          Comment:     m2           HOSPITAL     



                          Catergory Units  Interpretation                 

          



                          G1     >=90   Normal or high              

             



                          G2     60-89  Mildly decreased            

               



                          G3a    45-59  Mildly to moderately decreas

ed                           



                          G3b    30-44  Moderately to severely decre

ased                           



                          G4     15-29  Severely decreased          

                 



                          G5     <15   Kidney failure             

              



                          The eGFR was calculated using the Chronic Kidney Disea

se                           



                          Epidemiology Collaboration (CKD-EPI) equation.        

                   



                          Interpretation is based on recommendations of the     

                      



                          National Kidney Foundation-Kidney Disease Outcomes James

lity                           



                          Initiative (NKF-KDOQI) published in 2014.             

              



                                                                 









                                        Specimen

 

                                        Blood









                Performing Organization Address         City/[facility]/ZIP Code Phon

e Number

 

                [facility] DEPARTMENT OF PATHOLOGY AND [address] [place], [place] [address]

0 



                [address] [address] 



POC panel (10/07/2020 11:26 AM CDT)





             Component    Value        Ref Range    Performed At Pathologist



                                                                 [facility]

 

             POC sodium   140          135 - 148    [facility] 



                                       mmol/L       \[facility]\""     

 

             POC potassium 3.8          3.5 - 5.0    [facility] 



                                       mmol/L       \[facility]\""     

 

             POC chloride 103          99 - 109     [facility] 



                                       mmol/L       \[facility]\""     

 

             POC CO2      24           24 - 31 mmol/L [facility]     

 

             POC glucose  99           65 - 99 mg/dL [facility]     

 

             POC BUN      12           8 - 24 mg/dL [facility]     

 

             POC creatinine 0.8          0.7 - 1.2    [facility] 



                                       mg/dl        \[facility]\""     

 

             POC hematocrit 46           41 - 51 %    [facility]     

 

             POC anion gap 18           8 - 20 mmol/L [facility] 



                          Comment:                  HOSPITAL     



                           Name: Hoa Glover                       

    



                          Device ID: [id number]                           



                                                                 









                                        Specimen

 

                                        









                Performing Organization Address         City/[facility]/ZIP Code Phon

e Number

 

                [facility] DEPARTMENT OF PATHOLOGY AND [address] [place] [address]

0 



                GENOMIC MEDICINE                                 

 

                [address] [address] 



CBC with platelet and differential (10/07/2020 11:20 AM CDT)





             Component    Value        Ref Range    Performed At Pathologist



                                                                 Signature

 

             WBC          6.36         4.50 - 11.00 [facility]/uL         \[facility]\""     

 

             RBC          4.12 (L)     4.40 - 6.00  [facility]     

 

             HGB          14.1         14.0 - 18.0  [facility] 



                                       g/dL         \[facility]\""     

 

             HCT          40.1 (L)     41.0 - 51.0 % [facility]     

 

             MCV          97.3         82.0 - 100.0 [facility]     

 

             MCH          34.2 (H)     27.0 - 34.0 pg [facility]     

 

             MCHC         35.2         31.0 - 37.0  [facility] 



                                       g/[facility]     

 

             RDW - SD     49.0         37.0 - 55.0 fL [facility]     

 

             MPV          10.4         8.8 - 13.2 fL [facility]     

 

             Platelet count 165          150 - 400 k/uL [facility]     

 

             Nucleated RBC 0.00         /100 WBC     [facility]     

 

             Neutrophils  71.7 (H)     39.0 - 69.0 % [facility]     

 

             Lymphocytes  18.6 (L)     25.0 - 45.0 % [facility]     

 

             Monocytes    7.4          0.0 - 10.0 % [facility] [place]     

 

             Eosinophils  2.0          0.0 - 5.0 %  [facility]     

 

             Basophils    0.0          0.0 - 1.0 %  [facility]     

 

             Immature granulocytes 0.3Comment:  0.0 - 1.0 %  [facility] 



                          "Immature                 HOSPITAL     



                          granulocytes"                           



                          (promyelocytes                           



                          , myelocytes,                           



                          metamyelocytes                           



                          )                                      









                                        Specimen

 

                                        Blood









                Performing Organization Address         City/[facility]/ZIP Code Phon

e Number

 

                [facility] DEPARTMENT OF PATHOLOGY AND [address] [place], [place] [address]

0 



                GENOMIC MEDICINE                                 

 

                [facility] [address] [place] [address] 



ECG 12 lead (10/07/2020 10:41 AM CDT)





             Component    Value        Ref Range    Performed At Pathologist Sig

nature

 

             Ventricular rate 67                        [facility]     

 

             Atrial rate  67                        [facility]     

 

             IL interval  172                       [facility]     

 

             QRSD interval 112                       [facility]     

 

             QT interval  392                       [facility]     

 

             QTC interval 414                       [facility]     

 

             P axis 1     72                        [facility]     

 

             QRS axis 1   15                        [facility]     

 

             T wave axis  94                        [facility]     

 

                          EKG impression            Sinus rhythm with premature 

atrial complexes-Anteroseptal infarct

(cited on or before 10-FEB-2017)-ST & T wave abnormality, consider lateral 
ischemia-Abnormal ECG-In automated comparison with ECG of 2019 08:11,-
Questionable change in initial                     [facility]            



                                                    forces of Lateral leads-T wa

ve inversion more evident in Anterior leads-

Electronically Signed By Catherine Felix MD ([id number]) on 10/7/2020 11:31:57 AM        

                                 



                                                                 









                                        Specimen

 

                                        









                          Narrative                 Performed At

 

                                        This result has an attachment that is no

t available.



                                        









                Performing Organization Address         City/State/ZIP Code Phon

e Number

 

                [facility]        [address] [place] [address] 



Cv stress test (2020  2:10 PM CDT)





             Component    Value        Ref Range    Performed At Pathologist



                                                                 Signature

 

             Resting HR   76                        [facility]     

 

             Resting BP   142&76                    [facility]     

 

             Peak MET Achieved 1.0                       [facility]     

 

             Protocol Name LexiScan                  [facility]     

 

             Time in Exercise 00:01:00                  [facility]     



             Phase                                               

 

             Max Systolic                        [facility]     

 

             Max Diastolic BP 76                        [facility]     

 

             Max Heart Rate 105                       [facility]     

 

             Max Predicted Heart 142                       [facility]     



             Rate                                                

 

             Target HR Formula (220 - Age)*85%              [facility]     

 

             Chest Pain Statement none                      [facility]     

 

             Reason for   As per Lexiscan              [facility]     



             Termination  protocol                               

 

             Stress Test  Waveform interpreted in              [facility]     



             Impression   report associated with                           



                          image study.  No                           



                          interpretation is                           



                          provided as part of                           



                          this Stress ECG                           



                          report.--Electronically                           



                          Signed By eDnny Cardenas MD ([id number]),                           



                           Andree Mera                           



                          ([id number]) on 2020                           



                          1:13:43 PM                             

 

             Target HR    142.00       bpm          [facility]     









                                        Specimen

 

                                        









                          Narrative                 Performed At

 

                                        This result has an attachment that is no

t available.



                                        









                Performing Organization Address         City/State/ZIP Code Phon

e Number

 

                [facility]        [address] [place] [address] 



Nm myocardial perfusion (2020  2:10 PM CDT)





             Component    Value        Ref Range    Performed At Pathologist Sig

nature

 

             Target HR    142.00       bpm           [facility]     









                                        Specimen

 

                                        









                          Narrative                 Performed At

 

                                                      

    



                                         [facility]



                                                 Nuclear Cardi

ology and Cardiac CT



                                        



                                             [address] [facility] [address] [address]



                                        



                                                      

 [phone]



                                        



                                                      

    



                                        



                                                 Myocardial Pe

rfusion Imaging Report



                                        



                                           Stress ECG tracings are availab

le in MUSE, EPIC and [employer] Web



                                        



                                             All ECG interpretations a

re included in this report



                                        



                          Pat.Name: BEVERLY SANTACRUZ  Pat.ID:  [id number]

[id number]     



                                           



                                        



                          St.Date:  2020        Refer.MD: 

VENANCIO GARCIA MD 



                                           



                                        



                                        Exam Time: 6:30:00 AM       





                                        



                                        Study Type:Myocardial Perfusion Imaging



                                        



                          Height:  68in          Weight:

  156lb   



                                               



                                        



                          BSA:    1.84 m2          Ag

e: 1940,80Y 



                                             



                                        



                          Sex:    MALE          Nuclea

r Tech:Syed Monreal 



                                        [facility]    



                                        



                                        Nuclear Event ID:[id number]     



                                        



                                        Order ID: [id number]      

 



                                        



                                        Reason for Study:CAD, unspecified*, Shor

tness of breath,



                                        



                                        Cardiomyopathy



                                        



                                        Procedures: Single Day Rest / Stress



                                        



                                        Race:         

  



                                        



                                        Clinical Symptoms:Regadenoson   



                                        



                                        ------------------------------------



                                        



                                        SUMMARY:



                                        



                                        ------------------------------------



                                        



                                        BASELINE ECG Normal Sinus Rhythm, APC,

 Old Anterior MI



                                        



                                        STRESS TEST RESULTS



                                        



                                        Maximal Predicted  beats/minute 

85% Maximal Predicted 



                                        



                                        beats/minute



                                        



                                        Stress Test Duration 1 minutes 00 seco

nds 



                                        



                                        Resting Heart Rate 68 beats/minute Max

imal Heart Rate 105



                                        



                                        beats/minute



                                        



                                        Resting Blood Pressure 142/76 mmHg Max

imal Blood Pressure 142/76



                                        



                                        mmHg



                                        



                                        % Maximal Heart Rate Achieved 74% 



                                        



                                        Symptoms During Test Flushing, Headach

e, Dyspnea, Dizziness



                                        



                                        Reason for Stopping Test As per regade

noson protocol



                                        



                                        Maximal ST-segment shift None 



                                        



                                        Stress-Induced Arrhythmias None



                                        



                                        Ischemic electrocardiographic changes (S

T-segment depression) did not



                                        



                                        occur at peak regadenoson stress. 



                                        



                                        STRESS TEST INTERPRETATION Normal maxima

l regadenoson stress test.



                                        



                                        SCINTIGRAPHIC RESULTS



                                        



                                        Perfusion Defect Size (% LV) 



                                        



                                        40% Total 0% Ischemia 40% Scar



                                        



                                        Left Ventricular Perfusion Results



                                        



                                        There is a severe apical, apical-septal,

 anteroapical, inferoapical



                                        



                                        and mid anterior perfusion defect during

 stress which remains



                                        



                                        unchanged with rest imaging. There is a 

moderate apical-lateral and



                                        



                                        mid anteroseptal perfusion defect during

 stress which remains



                                        



                                        unchanged with rest imaging. There is a 

mild mid septal perfusion



                                        



                                        defect during stress which remains uncha

nged with rest imaging. 



                                        



                                        Gated SPECT Results



                                        



                                        The post stress left ventricular ejectio

n fraction is 28% with



                                        



                                        akinesis of all hypoperfused segments. 

Left ventricular end-diastolic



                                        



                                        volume is 213 ml; end-systolic volume is

 152 ml. The left ventricle



                                        



                                        is severely enlarged at stress and rest.

 



                                        



                                        Conclusion



                                        



                                        Abnormal regadenoson Tc-99m sestamibi my

ocardial perfusion study



                                        



                                        compatible with scar in the left anterio

r descending coronary artery



                                        



                                        vascular territory. The LVEF is severely

 depressed. Severe LV dilation



                                        



                                        is present. 



                                        



                                        Comments



                                        



                                        The study results indicate a high (>2%) 

annual risk for a cardiac



                                        



                                        death or non-fatal myocardial infarction

. 



                                        



                                        Study Quality/Artifacts



                                        



                                        The study quality is good. 



                                        



                                        Comparison to Previous Study



                                        



                                        None available.  



                                        



                                        ------------------------------------



                                        



                                        FINDINGS:



                                        



                                        ------------------------------------



                                        



                                        Signed 2020 08:29 PM



                                        



                          Denny Cardenas MD     









                                        Procedure Note

 

                                        Interface, Radiology Results In - 2020  8:31 PM CDT







                                                          Nuclear Cardiology and

 Cardiac CT



                                                 [address] [place] [address]



                                                                     [phone]

[phone]



                                        



                                                          Myocardial Perfusion I

maging Report



                                              Stress ECG tracings are available 

in MUSE, EPIC and CV Web



                                                  All ECG interpretations are in

cluded in this report



                                        Pat.Name:  BEVERLY SANTACRUZ    Pat.I

D:    [id number]



                                        .Date:   2020               Refer

.MD:  VENANCIO GARCIA MD



                                        Exam Time: 6:30:00 AM



                                        Study Type:Myocardial Perfusion Imaging



                                        Height:    68in                    Weigh

t:    156lb



                                        BSA:       1.84 m2                   

Age:  1940,80Y



                                        Sex:       MALE                    Nucle

ar Tech:Syed Monreal [facility]



                                        Nuclear Event ID:[id number]



                                        Order ID:  [id number]



                                        Reason for Study:CAD, unspecified*, Shor

tness of breath,



                                        Cardiomyopathy



                                        Procedures: Single Day Rest / Stress



                                        Race:      



                                        Clinical Symptoms:Regadenoson



                                        ------------------------------------



                                        SUMMARY:



                                        ------------------------------------



                                        BASELINE ECG  Normal Sinus Rhythm, APC, 

Old Anterior MI



                                        STRESS TEST RESULTS



                                        Maximal Predicted HR  142 beats/minute 8

5% Maximal Predicted 



                                        beats/minute



                                        Stress Test Duration  1 minutes 00 secon

ds



                                        Resting Heart Rate  68 beats/minute Maxi

mal Heart Rate  105



                                        beats/minute



                                        Resting Blood Pressure  142/76 mmHg Maxi

mal Blood Pressure  142/76



                                        mmHg



                                        % Maximal Heart Rate Achieved 74%



                                        Symptoms During Test  Flushing, Headache

, Dyspnea, Dizziness



                                        Reason for Stopping Test  As per regaden

oson protocol



                                        Maximal ST-segment shift  None



                                        Stress-Induced Arrhythmias None



                                        Ischemic electrocardiographic changes (S

T-segment depression) did not



                                        occur at peak regadenoson stress.



                                        STRESS TEST INTERPRETATION Normal maxima

l regadenoson stress test.



                                        SCINTIGRAPHIC RESULTS



                                        Perfusion Defect Size (% LV)



                                        40% Total 0% Ischemia  40% Scar



                                        Left Ventricular Perfusion Results



                                        There is a severe apical, apical-septal,

 anteroapical, inferoapical



                                        and mid anterior perfusion defect during

 stress which remains



                                        unchanged with rest imaging. There is a 

moderate apical-lateral and



                                        mid anteroseptal perfusion defect during

 stress which remains



                                        unchanged with rest imaging. There is a 

mild mid septal perfusion



                                        defect during stress which remains uncha

nged with rest imaging.



                                        Gated SPECT Results



                                        The post stress left ventricular ejectio

n fraction is 28% with



                                        akinesis of all hypoperfused segments.  

Left ventricular end-diastolic



                                        volume is 213 ml; end-systolic volume is

  152 ml.  The left ventricle



                                        is severely enlarged at stress and rest.



                                        Conclusion



                                        Abnormal regadenoson Tc-99m sestamibi my

ocardial perfusion study



                                        compatible with scar in the left anterio

r descending coronary artery



                                        vascular territory. The LVEF is severely

 depressed. Severe LV dilation



                                        is present.



                                        Comments



                                        The study results indicate a high (>2%) 

annual risk for a cardiac



                                        death or non-fatal myocardial infarction

.



                                        Study Quality/Artifacts



                                        The study quality is good.



                                        Comparison to Previous Study



                                        None available.



                                        ------------------------------------



                                        FINDINGS:



                                        ------------------------------------



                                        Signed 2020 08:29 PM



                                        Denny Cardenas MD









                Performing Organization Address         City/State/ZIP Code Phon

e Number

 

                [facility]        [address] [place] [address] 



Transthoracic Echocardiogram Complete, (w Contrast, Strain and 3D if needed) 
(2020  2:13 PM CDT)





                                        Specimen

 

                                        









                          Narrative                 Performed At

 

                                                      

    



                                        [facility]



                                                [facility]



                                        



                                                      

    



                                        



                                                    Echo

cardiography Report



                                        



                                                      

    



                                        



                          Pat.Name: BEVERLY SANTACRUZ  Pat.ID:  [id number]

[id number]     



                                           



                                        



                          .Date:  2020        Refer.MD: 

VENANCIO GARCIA MD   



                                        



                          Exam Time: 9:35:00 AM       Study Type:R

outine Echo   



                                           



                                        



                          Height:  68in          Weight:

  156lb   



                                               



                                        



                          BSA:    1.84 m2          Ag

e: 1940,80Y 



                                             



                                        



                          Sex:    MALE          BP: 

   143/63  



                                               



                                        



                                        HR:    61 bpm       

  



                                        



                                        Sonogrphr: KASIE Saavedra FASE



                                        



                          Pat. Stat.:Outpatient       Room:   

[facility]   



                                             



                                        



                                        Study Status:Final        

 



                                        



                                        Echo Event ID:[id number]      





                                        



                                        Order ID: [id number]      

 



                                        



                                        Reason for Study:CAD in native artery [I

25.10 (ICD-10-CM)];



                                        



                                        Cardiomyopathy, unspecified type (HCC) [

I42.9 (ICD-10-CM)]



                                        



                                        History / Clinical:Chest pain, Coronary 

artery disease



                                        



                                        Procedures: 2D Echo, Colorflow Doppler



                                        



                                        Race:   C         

   



                                        



                                        ------------------------------------



                                        



                                        SUMMARY:



                                        



                                        ------------------------------------



                                        



                                        LV size is mildly enlarged. LV EF is m

oderately to severely



                                        



                                        depressed. Regional wall motion abnorm

alities present consistent with



                                        



                                        large apical region of infarction.



                                        



                                        Diastolic dysfunction Grade I (Mild): Im

paired relaxation with normal



                                        



                                        LV filling pressures.



                                        



                                        ------------------------------------



                                        



                                        FINDINGS:



                                        



                                        ------------------------------------



                                        



                                        LV:    LV size is mildly enlarged.

 LV EF is moderately to severely



                                        



                                             depressed. Regional wall 

motion abnormalities present.



                                        



                                             Estimated EF is 30-34%.



                                        



                                        RV:    RV size is normal. RV systo

lic function is normal.



                                        



                                        LA:    LA size is normal.



                                        



                                        RA:    RA size is normal.



                                        



                                        AO:    Aortic root diameter is nor

mal.



                                        



                                        ANDRÉS:   No pericardial effusion.



                                        



                                        AV:    No structural AV abnormalit

ies noted. Mild aortic



                                        



                                             regurgitation. 



                                        



                                        MV:    No structural MV abnormalit

ies noted.



                                        



                                        PV:    No structural PV abnormalit

ies noted.



                                        



                                        TV:    No structural TV abnormalit

ies noted.



                                        



                                        [place]:   Diastolic dysfunction Grade 

I (Mild): Impaired relaxation



                                        



                                             with normal LV filling pr

essures.



                                        



                                        Other:  Estimated PA systolic pressu

re is 30 mmHg, assuming a mean



                                        



                                             RAP of 5 mmHg.



                                        



                                        ------------------------------------



                                        



                                        MEASUREMENTS:



                                        



                                        ------------------------------------



                                        



                                                      

   2D



                                        



                                        Parasternal Long Axis



                                        



                           Ao An      1.8 cm      LVPWd 

     



                                        0.72 cm 



                                        



                           Ao Rtd      3.2 cm      Index 

   1.7 



                                        cm/m2      



                                        



                                         LA Ds      4.1 cm 



                                        



                           IVSd       0.9 cm      RWT 

      



                                        0.26   



                                        



                           LVIDd      5.5 cm      Index 

   3 cm/m2 



                                              



                                        



                                         LV Mass     162 g  (122-1

74)



                                        



                           LVIDs      3.6 cm      LVM In

dex     88 



                                        g/m



                                        



                                         LV%fs       35 %    

  



                                        



                                        LA Sng Plane



                                        



                           LA Area      15 cm (8.8-23.4) LA Vol 

     43 



                                        ml 



                                        



                                         Index    23 ml/m2



                                        



                                         LA LngAx     4.8 cm    

  



                                        



                                        LVOT Stroke Vol 



                                        



                                         LVOT       1.8 cm    

  



                                        



                                        LVOT 



                                        



                                         LVOT Area    2.5 cm    

 



                                        



                                                      

  DOPPLER



                                        



                                        LVOT Stroke Vol 



                                        



                           LVOT TVI     20 cm      LVOT CI

     1.5 



                                        l/m/m



                                        



                           LVOT SV      50 ml      HR  

      



                                        57 bpm 



                                        



                                         LVOT CO     2.8 l/min   

 



                                        



                                        LVOT 



                                        



                                         SVi        27 ml/m  

   



                                        



                                         



                                        



                                        ------------------------------------



                                        



                                        WALL MOTION:



                                        



                                        ------------------------------------



                                        



                                        RESTING WALL MOTION:



                                        



                                        Mid Anteroseptal, Apical Anterior, Apica

l Septal, Apical Inferior,



                                        



                                        Apical Lateral, Apical walls are akineti

c. Mid Anterior, Mid



                                        



                                        Inferoseptal walls are hypokinetic.  N

ormal wall motion in all other



                                        



                                        walls.



                                        



                                        Wall Index = 1.8



                                        



                                        Signed 2020 10:41 AM



                                        



                          Poncho Salamanca M.D.   









                                        Procedure Note

 

                                        Interface, Radiology Results In - 2020 10:41 AM CDT







                                                        [facility]



                                        



                                                                Echocardiography

 Report



                                        



                                        Pat.Name:  BEVERLY SANTACRUZ    Pat.I

D:    [id number]



                                        .Date:   2020               Refer

.MD:  VENANCIO GARCIA MD



                                        Exam Time: 9:35:00 AM              Study

 Type:Routine Echo



                                        Height:    68in                    Weigh

t:    156lb



                                        BSA:       1.84 m2                   

Age:  1940,80Y



                                        Sex:       MALE                    BP:  

      143/63



                                        HR:        61 bpm



                                        Sonogrphr: KASIE Saavedra FASE



                                        Pat. Stat.:Outpatient              Room:

      [facility]



                                        Study Status:Final



                                        Echo Event ID:[id number]



                                        Order ID:  [id number]



                                        Reason for Study:CAD in native artery [I

25.10 (ICD-10-CM)];



                                        Cardiomyopathy, unspecified type (HCC) [

I42.9 (ICD-10-CM)]



                                        History / Clinical:Chest pain, Coronary 

artery disease



                                        Procedures: 2D Echo, Colorflow Doppler



                                        Race:      C



                                        ------------------------------------



                                        SUMMARY:



                                        ------------------------------------



                                        LV size is mildly enlarged.  LV EF is mo

derately to severely



                                        depressed.  Regional wall motion abnorma

lities present consistent with



                                        large apical region of infarction.



                                        Diastolic dysfunction Grade I (Mild): Im

paired relaxation with normal



                                        LV filling pressures.



                                        ------------------------------------



                                        FINDINGS:



                                        ------------------------------------



                                        LV:       LV size is mildly enlarged. LV

 EF is moderately to severely



                                                  depressed. Regional wall motio

n abnormalities present.



                                                  Estimated EF is 30-34%.



                                        RV:       RV size is normal. RV systolic

 function is normal.



                                        LA:       LA size is normal.



                                        RA:       RA size is normal.



                                        AO:       Aortic root diameter is normal

.



                                        ANDRÉS:     No pericardial effusion.



                                        AV:       No structural AV abnormalities

 noted. Mild aortic



                                                  regurgitation.



                                        MV:       No structural MV abnormalities

 noted.



                                        PV:       No structural PV abnormalities

 noted.



                                        TV:       No structural TV abnormalities

 noted.



                                        [place]:     Diastolic dysfunction Grade I 

(Mild): Impaired relaxation



                                                  with normal LV filling pressur

es.



                                        Other:    Estimated PA systolic pressure

 is 30 mmHg, assuming a mean



                                                  RAP of 5 mmHg.



                                        ------------------------------------



                                        MEASUREMENTS:



                                        ------------------------------------



                                                                          2D



                                        Parasternal Long Axis



                                         Ao An            1.8 cm            LVPW

d           0.72 cm



                                         Ao Rtd           3.2 cm            Inde

x        1.7 cm/m2



                                         LA Ds            4.1 cm



                                         IVSd             0.9 cm            RWT 

            0.26



                                         LVIDd            5.5 cm            Inde

x        3 cm/m2



                                         LV Mass          162 g    (122-174)



                                         LVIDs            3.6 cm            LVM 

Index         88 g/m



                                         LV%fs             35 %



                                        LA Sng Plane



                                         LA Area           15 cm  (8.8-23.4) L

A Vol            43 ml



                                         Index        23 ml/m2



                                         LA LngAx         4.8 cm



                                        LVOT Stroke Vol



                                         LVOT             1.8 cm



                                        LVOT



                                         LVOT Area        2.5 cm



                                                                        DOPPLER



                                        LVOT Stroke Vol



                                         LVOT TVI          20 cm            LVOT

 CI          1.5 l/m/m



                                         LVOT SV           50 ml            HR  

              57 bpm



                                         LVOT CO          2.8 l/min



                                        LVOT



                                         SVi               27 ml/m



                                        



                                        ------------------------------------



                                        WALL MOTION:



                                        ------------------------------------



                                        RESTING WALL MOTION:



                                        Mid Anteroseptal, Apical Anterior, Apica

l Septal, Apical Inferior,



                                        Apical Lateral, Apical walls are akineti

c.  Mid Anterior, Mid



                                        Inferoseptal walls are hypokinetic.   No

rmal wall motion in all other



                                        walls.



                                        Wall Index = 1.8



                                        Signed 2020 10:41 AM



                                        Poncho Salamanca M.D.









                Performing Organization Address         City/[facility]/ZIP Code Phon

e Number

 

                 [facility]        [id number] [id number] 



NON HDL CHOLESTEROL ([facility]) (2020 10:00 AM CDT)





             Component    Value        Ref Range    Performed At Pathologist



                                                                 Signature

 

             Non-HDL cholesterol 76           <130 mg/dL   [employer] [facility] 



                          Comment:     (calc)       [facility]      



                          For patients with diabetes plus 1 major ASCVD risk    

                       



                          factor, treating to a non-HDL-C goal of <100 mg/dL    

                       



                          (LDL-C of <70 mg/dL) is considered a therapeutic      

                     



                          option.                                



                                                                 









                                        Specimen

 

                                        









                          Narrative                 Performed At

 

                                        FASTING:YES



                                        [facility]



                          FASTING: YES              









                                        Resulting Agency Comment

 

                                        Performing Organization Information:







                                          Site ID: [id number]







                                          Name: [email or web address]







                                          Address: [address] [place] [phone]







                                          Director: Derek Peace









                Performing Organization Address         City/State/ZIP Code Phon

e Number

 

                [facility]                                           

 

                [employer] [place] [id number] [phone]



CHOL/HDLC RATIO ([facility]) (2020 10:00 AM CDT)





             Component    Value        Ref Range    Performed At Pathologist Sig

nature

 

             Cholesterol/HDL ratio 2.4          <5.0 (calc)  [employer] [facility]      









                                        Specimen

 

                                        









                          Narrative                 Performed At

 

                                        FASTING:YES



                                        [facility]



                          FASTING: YES              









                                        Resulting Agency Comment

 

                                        Performing Organization Information:







                                          Site ID: A







                                          Name: [email or web address]







                                          Address: [address] [place] [phone]







                                          Director: Derek Peace









                Performing Organization Address         City/[facility]/ZIP Code Phon

e Number

 

                [facility]                                           

 

                [place] [address] 

[phone]



COPY RECEIVED FROM: (2020 10:00 AM CDT)





             Component    Value        Ref Range    Performed At Pathologist Sig

nature

 

             Copy received from:                           [facility]        



                          Comment:                               



                                                               



                               [address]                   

        



                               [address]                 

          



                               [place] [address]                

           



                                                                 









                                        Specimen

 

                                        









                          Narrative                 Performed At

 

                                        FASTING:YES



                                        [facility]



                          FASTING: YES              









                Performing Organization Address         City/[facility]/ZIP Code Phon

e Number

 

                [facility]                                           



LDL-CHOLESTEROL ([facility]) (2020 10:00 AM CDT)





             Component    Value        Ref Range    Performed At Pathologist



                                                                 Signature

 

             LDL cholesterol 62           mg/dL (calc) [facility] 



             calculated   Comment:                  [facility]      



                          Reference range: <100                           



                                                                 



                          Desirable range <100 mg/dL for primary prevention;  

                          



                          <70 mg/dL for patients with CHD or diabetic patients  

                         



                          with > or = 2 CHD risk factors.                       

    



                                                                 



                          LDL-C is now calculated using the Mushtaq      

                     



                          calculation, which is a validated novel method providi

ng                           



                          better accuracy than the Friedewald equation in the   

                        



                          estimation of LDL-C.                           



                          [place] CARSON et al. DELMY. 2013;310(19): 2480-9704        

                   



                          (http://education.[employer]/faq/WWN148)    

                       



                                                                 









                                        Specimen

 

                                        









                          Narrative                 Performed At

 

                                        FASTING:YES



                                        [facility]



                          FASTING: YES              









                                        Resulting Agency Comment

 

                                        Performing Organization Information:







                                          Site ID: [id number]







                                          Name: [email or web address]







                                          Address: [address] [place] [phone]







                                          Director: Derek Peace









                Performing Organization Address         City/[facility]/ZIP Code Phon

e Number

 

                [facility]                                           

 

                [place] [address] 

[phone]



COPY(IES) SENT TO: (2020 10:00 AM CDT)





             Component    Value        Ref Range    Performed At [facility]

nature

 

             Copies/mL                              [facility]        



                          Comment:                               



                                                               



                                JOEL [address]                 

          



                               [address] [facility] [address]                

           



                               [place] [address]                 

          



                                                                 









                                        Specimen

 

                                        









                          Narrative                 Performed At

 

                                        FASTING:YES



                                        [facility]



                          FASTING: YES              









                Performing Organization Address         City/[facility]/ZIP Code Phon

e Number

 

                [facility]                                           



Triglycerides (2020 10:00 AM CDT)





             Component    Value        Ref Range    Performed At [facility]

nature

 

             Triglycerides 67           <150 mg/dL   [facility] [place] 









                                        Specimen

 

                                        









                          Narrative                 Performed At

 

                                        FASTING:YES



                                        [facility]



                          FASTING: YES              









                                        Resulting Agency Comment

 

                                        Performing Organization Information:







                                          Site ID: [facility]







                                          Name: [facility]







                                          Address: [address] [place] [phone]







                                          Director: Derek Peace









                Performing Organization Address         City/State/ZIP Code Phon

e Number

 

                [facility]                                           

 

                [facility] [place] [address] [place] [address] [phone]



AST (SGOT) (2020 10:00 AM CDT)





             Component    Value        Ref Range    Performed At [facility]

nature

 

             AST          20           10 - 35 U/L  [facility] [place] 









                                        Specimen

 

                                        Blood









                          Narrative                 Performed At

 

                                        FASTING:YES



                                        [facility]



                          FASTING: YES              









                                        Resulting Agency Comment

 

                                        Performing Organization Information:







                                          Site ID: [facility]







                                          Name: [facility]







                                          Address: [address] [place] [phone]







                                          Director: Derek Peace









                Performing Organization Address         City/[facility]/ZIP Code Phon

e Number

 

                [facility]                                           

 

                [facility] [place] [id number] [phone]



HDL cholesterol (2020 10:00 AM CDT)





             Component    Value        Ref Range    Performed At [facility]

nature

 

             HDL cholesterol 54           > OR = 40 mg/dL [facility]

[place] 









                                        Specimen

 

                                        









                          Narrative                 Performed At

 

                                        FASTING:YES



                                        [facility]



                          FASTING: YES              









                                        Resulting Agency Comment

 

                                        Performing Organization Information:







                                          Site ID: [facility]







                                          [facility]







                                          Address: [address] [place] [phone]







                                          Director: Derek Peace









                Performing Organization Address         City/[facility]/ZIP Code Phon

e Number

 

                [facility]                                           

 

                [facility] [place] [address] [place] [address] [phone]



Cholesterol (2020 10:00 AM CDT)





             Component    Value        Ref Range    Performed At [facility]

nature

 

             Cholesterol, total 130          <200 mg/dL   [facility]

[place] 









                                        Specimen

 

                                        









                          Narrative                 Performed At

 

                                        FASTING:YES



                                        [facility]



                          FASTING: YES              









                                        Resulting Agency Comment

 

                                        Performing Organization Information:







                                          Site ID: [facility]







                                          Name: [facility]







                                          Address: [address] [place] [phone]







                                          Director: Derek Peace









                Performing Organization Address         City/[facility]/ZIP Code Phon

e Number

 

                [facility]                                           

 

                [facility] [address] [address] [phone]



Lipid panel (2020 10:00 AM CDT)





             Component    Value        Ref Range    Performed At Pathologist



                                                                 [facility]

 

             Cholesterol, total 130          <200 mg/dL   [facility] 



                                                    [place]      

 

             HDL cholesterol 54           > OR = 40    [facility] 



                                       mg/dL        [place]      

 

             Triglycerides 67           <150 mg/dL   [facility] 



                                                    [place]      

 

             LDL cholesterol 62           mg/dL (calc) [facility] 



             calculated   Comment:                  [facility]      



                          Reference range: <100                           



                                                                 



                          Desirable range <100 mg/dL for primary prevention;  

                          



                          <70 mg/dL for patients with CHD or diabetic patients  

                         



                          with > or = 2 CHD risk factors.                       

    



                                                                 



                          LDL-C is now calculated using the Mushtaq      

                     



                          calculation, which is a validated novel method providi

ng                           



                          better accuracy than the Friedewald equation in the   

                        



                          estimation of LDL-C.                           



                          [place] CARSON et al. DELMY. 2013;310(19): 0568-0880        

                   



                          (http://education.[employer]/faq/LZC138)    

                       



                                                                 

 

             Cholesterol/HDL 2.4          <5.0 (calc)  [facility] 



             ratio                                  [place]      

 

             Non-HDL cholesterol 76           <130 mg/dL   [facility] 



                          Comment:     (calc)       [place]      



                          For patients with diabetes plus 1 major ASCVD risk    

                       



                          factor, treating to a non-HDL-C goal of <100 mg/dL    

                       



                          (LDL-C of <70 mg/dL) is considered a therapeutic      

                     



                          option.                                



                                                                 









                                        Specimen

 

                                        Blood









                          Narrative                 Performed At

 

                                        FASTING:YES



                                        [facility]



                          FASTING: YES              









                                        Resulting Agency Comment

 

                                        Performing Organization Information:







                                          Site ID: [facility]







                                          Name: [email or web address]







                                          Address: [address] [place] [phone]







                                          Director: Derek Peace









                Performing Organization Address         City/State/[facility] Code Phon

e Number

 

                [facility] [address] [place] [address] 

[phone]



after 2020



Insurance







          Payer     Benefit Plan / Subscriber ID Effective Dates Phone     Addre

ss   Type



                    Group                                             

 

          MEDICARE  MEDICARE PART A [id number] 2007-Present           [place], [place] Medicare



                    AND B                                             

 

          AETNA     AETNA HMO,POS,EPO, [id number]    2001-Present               

      HMO



                    MC/EC                                             









           Guarantor Name Account Type Relation to Date of    Phone      Billing



                                 Patient    Birth                 Address

 

           Beverly Santacruz Personal/Family Self       1940 [phone] [address]

[address]                                     (Home)     [address] [place] [id number]







Advance Directives

For more information, please contact: [phone]





                Type            Date Recorded   Patient Representative Explanati

on

 

                Advance Directives, Living Will and                             

    



                Medical Power of

## 2021-02-22 NOTE — RAD REPORT
EXAM DESCRIPTION:  CT - Stone Protocol - 2/22/2021 11:45 am

 

CLINICAL HISTORY:  Flank pain.

HEMATURIA

 

COMPARISON:  Abdomen   Pelvis W/Wo Contrast dated 12/17/2020; Chest Abdomen Pelvis W Cont dated 2/16/
2021

 

TECHNIQUE:  Axial images were obtained without oral or IV contrast. Lack of contrast limits solid org
an and vascular assessment. The field-of-view spans the entirety of the  system partially obscuring
 uppermost abdomen and lung bases. Coronal reformatted images were obtained and reviewed.

 

All CT scans are performed using dose optimization technique as appropriate and may include automated
 exposure control or mA/KV adjustment according to patient size.

 

FINDINGS:  11 mm noncalcified nodule seen in the right lung base. Small 6 mm nodule seen in the left 
lung base.

 

The liver demonstrates multiple low-density lesions which are incompletely evaluated a limited noncon
trast study but likely cysts.No intra or extrahepatic biliary tree dilatation. The pancreas and adren
al glands are normal.

 

No urinary tract stones or obstructive uropathy. Soft tissue mass along the left aspect of the urinar
y bladder is seen measuring 32 x 19 mm. There is adjacent soft tissue mass in the left anterior pelvi
c fat adjacent to the bladder measuring 37 x 20 mm. This appears to erode the left superior pubic netta
us mildly. Several small left pelvic sidewall lymph nodes are present.

 

No bowel obstruction, free air, free fluid or abscess. Normal appendix noted.Prominent sigmoid divert
iculosis without diverticulitis.

 

Mild lumbosacral degenerative changes.

 

IMPRESSION:  32 x 19 mm soft tissue mass along the left aspect of the urinary bladder. Mass appears t
o extend outside of the urinary bladder into the anterior left pelvic fat near the left inguinal dorothy
on. There is erosion of the left superior pubic ramus by this mass.

 

Small nodules in the lung bases are presumed metastatic.

## 2021-02-22 NOTE — EDPHYS
Physician Documentation                                                                           

 [facility]                                                                 

Name: Beverly Santacruz                                                                                 

Age: 81 yrs                                                                                       

Sex: Male                                                                                         

: 1940                                                                                   

MRN: [id number]                                                                                   

Arrival Date: 2021                                                                          

Time: 09:04                                                                                       

Account#: [id number]                                                                            

Bed 25                                                                                            

Private MD:                                                                                       

ED Physician Jad Peterson                                                                         

HPI:                                                                                              

                                                                                             

10:25 This 81 yrs old  Male presents to ER via Ambulatory with complaints of Blood   cp  

      In Urine, Weakness.                                                                         

10:25 The patient presents with urinary symptoms, hematuria.                                  cp  

10:25 Onset: The symptoms/episode began/occurred 8 day(s) ago. Associated signs and symptoms: cp  

      Pertinent positives: general weakness.                                                      

10:25 Severity of symptoms: in the emergency department the symptoms are unchanged, despite   cp  

      home interventions. Patient reports he stopped taking Plavix today.                         

                                                                                                  

Historical:                                                                                       

- Allergies:                                                                                      

: No Known Allergies;                                                                     iw  

- Home Meds:                                                                                      

: clopidogrel 75 mg oral tab 1 tab once daily [Active]; atorvastatin 40 mg oral tab 1 tab iw  

      once daily [Active]; metoprolol tartrate 100 mg Oral tab [Active]; tamsulosin 0.4 mg        

      oral cp24 1 cap once daily [Active]; pantoprazole 40 mg oral TbEC 1 tab once daily          

      [Active]; aspirin 81 mg Oral chew 1 tab once daily [Active]; lactulose 20 gram/30 mL        

      Oral soln 30 mL once daily [Active]; Travatan Z 0.004 % ophthalmic drop 1 drop once         

      daily [Active];                                                                             

- PMHx:                                                                                           

09:19 Hypertension; Myocardial infarction; bladder cancer; Gastric Reflux;                    iw  

- PSHx:                                                                                           

09:19 Knee surgery; facial; back; Heart stents;                                               iw  

                                                                                                  

- Immunization history:: Adult Immunizations.                                                     

- Social history:: Smoking status: unknown.                                                       

                                                                                                  

                                                                                                  

ROS:                                                                                              

10:30 : Positive for hematuria, Negative for urinary frequency, flank pain, difficulty      cp  

      urinating, bladder incontinence, testicular pain                                            

10:30 Constitutional: Negative for body aches, chills, fever.                                 cp  

10:30 Abdomen/GI: Negative for abdominal pain, nausea, vomiting, and diarrhea.                    

10:30 Neuro: Positive for dizziness, weakness, Negative for altered mental status, headache,      

      loss of consciousness, syncope.                                                             

10:30 Eyes: Negative for injury, pain, redness, and discharge.                                cp  

10:30 Cardiovascular: Negative for chest pain, edema, palpitations.                           cp  

10:30 Respiratory: Negative for cough, shortness of breath, wheezing.                             

10:30 Back: Positive for pain at rest, pain with movement.                                        

10:30 All other systems are negative.                                                             

                                                                                                  

Exam:                                                                                             

10:35 Constitutional: The patient appears in no acute distress, alert, awake,                 cp  

      non-diaphoretic, non-toxic, well developed, well nourished.                                 

10:35 Head/Face:  Normocephalic, atraumatic.                                                  cp  

10:35 Eyes: Periorbital structures: appear normal, Conjunctiva: normal, no exudate, no            

      injection, Sclera: no appreciated abnormality, Lids and lashes: appear normal,              

      bilaterally.                                                                                

10:35 ENT: External ear(s): are unremarkable, Nose: is normal, Mouth: Lips: moist, Oral           

      mucosa: moist, Posterior pharynx: Airway: no evidence of obstruction, patent.               

10:35 Neck: ROM/movement: is normal, is supple, without pain, no range of motions limitations.    

10:35 Chest/axilla: Inspection: normal, Palpation: is normal, no crepitus, no tenderness.         

10:35 Cardiovascular: Rate: normal, Rhythm: regular, Edema: is not appreciated, JVD: is not       

      appreciated.                                                                                

10:35 Respiratory: the patient does not display signs of respiratory distress,  Respirations:     

      normal, no use of accessory muscles, no retractions, labored breathing, is not present,     

      Breath sounds: are clear throughout, no decreased breath sounds, no stridor, no             

      wheezing.                                                                                   

10:35 Abdomen/GI: Inspection: abdomen appears normal, Bowel sounds: active, Palpation:            

      abdomen is soft and non-tender, in all quadrants.                                           

10:35 Back: CVA tenderness, is absent.                                                        cp  

10:35 Neuro: Orientation: to person, place \T\ time. Mentation: is normal, Motor: moves all       

      fours, strength is normal, Gait: is steady.                                                 

10:43 ECG was reviewed by the Attending Physician.                                            cp  

                                                                                                  

Vital Signs:                                                                                      

09:13  / 85; Pulse 91; Resp 16; Temp 98.7; Pulse Ox 98% on R/A; Weight 70.31 kg; Height iw  

      5 ft. 8 in. (172.72 cm); Pain 0/10;                                                         

11:23  / 61 Supine; Pulse 71;                                                           ll1 

11:23  / 66 Sitting; Pulse 82;                                                          ll1 

11:23  / 75 Standing; Pulse 98;                                                         ll1 

12:25  / 59; Pulse 80; Resp 20; Pulse Ox 98% on R/A;                                    vg1 

13:00  / 54; Pulse 88; Resp 20; Pulse Ox 100% on R/A;                                   vg1 

09:13 Body Mass Index 23.57 (70.31 kg, 172.72 cm)                                             iw  

                                                                                                  

MDM:                                                                                              

10:08 Patient medically screened.                                                             cp  

11:00 Differential diagnosis: UTI, urinary retention, prostatitis, urethritis, anemia, kidney cp  

      stone.                                                                                      

13:38 Physician consultation: was contacted at 13:30, regarding patient's condition, spoke    cp  

      with DR Bazzi, who recommends outpatient f/u with DR Valdez, oncology, and DR Pool,            

      cardiology. Patient to seek medical attn worsening symptoms.                                

13:40 Data reviewed: vital signs, nurses notes, lab test result(s), EKG, radiologic studies,  cp  

      CT scan, I have discussed the patient's presentation/case with the attending Emergency      

      Department Physician; and as a result, I will discharge patient.                            

13:40 Counseling: I had a detailed discussion with the patient and/or guardian regarding: the cp  

      historical points, exam findings, and any diagnostic results supporting the                 

      discharge/admit diagnosis, lab results, radiology results, the need for outpatient          

      follow up, for definitive care, oncology. ED course: VSS. Labs show mild anemia. Will       

      discharge patient to home for continued monitoring.                                         

                                                                                                  

                                                                                             

10:08 Order name: Urine Microscopic Only; Complete Time: 11:19                                cp  

                                                                                             

11:20 Interpretation: Normal except: URBC LOADED.                                             cp  

                                                                                             

10:23 Order name: Basic Metabolic Panel; Complete Time: 11:37                                 cp  

                                                                                             

11:37 Interpretation: Normal except: BUN 6.                                                   cp  

                                                                                             

10:23 Order name: CBC with Diff; Complete Time: 11:19                                         cp  

                                                                                             

11:20 Interpretation: Normal except: RBC 3.89; HGB 12.8; HCT 37.2; MCV 95.6; MCH 32.9; FERNANDO%   cp  

      77.9; LYM% 9.6.                                                                             

                                                                                             

10:23 Order name: LFT's; Complete Time: 11:37                                                 cp  

                                                                                             

10:23 Order name: Magnesium; Complete Time: 11:37                                             cp  

                                                                                             

10:23 Order name: NT PRO-BNP; Complete Time: 11:37                                            cp  

                                                                                             

10:23 Order name: PT-INR; Complete Time: 11:19                                                cp  

                                                                                             

10:23 Order name: Troponin (emerg Dept Use Only); Complete Time: 11:37                        cp  

                                                                                             

10:23 Order name: Ptt, Activated; Complete Time: 11:19                                        cp  

                                                                                             

10:24 Order name: Procalcitonin; Complete Time: 11:37                                         cp  

                                                                                             

10:56 Order name: Urine Dipstick--Ancillary (enter results); Complete Time: 11:37             bd  

                                                                                             

11:24 Order name: CT Stone Protocol; Complete Time: 12:07                                     cp  

                                                                                             

10:08 Order name: Urine Dipstick-Ancillary (obtain specimen); Complete Time: 10:55            cp  

                                                                                             

10:23 Order name: EKG; Complete Time: 10:24                                                   cp  

                                                                                             

10:23 Order name: Cardiac monitoring; Complete Time: 10:44                                    cp  

                                                                                             

10:23 Order name: EKG - Nurse/Tech; Complete Time: 10:44                                      cp  

                                                                                             

10:23 Order name: IV Saline Lock; Complete Time: 10:55                                        cp  

                                                                                             

10:23 Order name: Labs collected and sent; Complete Time: 10:55                               cp  

                                                                                             

10:23 Order name: O2 Per Protocol; Complete Time: 10:55                                       cp  

                                                                                             

10:23 Order name: O2 Sat Monitoring; Complete Time: 10:55                                     cp  

                                                                                             

10:23 Order name: Orthostatics; Complete Time: 10:45                                          cp  

                                                                                                  

ECG:                                                                                              

10:43 Rate is 77 beats/min. Rhythm is regular. MO interval is normal. QRS interval is normal. cp  

      QT interval is normal. T waves are Inverted in leads aVL, aVR, V2, V3, V4, V5, V6.          

      Interpreted by me. Reviewed by me.                                                          

                                                                                                  

Administered Medications:                                                                         

11:38 Not Given (Physician Discretion): NS 0.9% 500 ml IV at 500 ml/hr once                   cp  

11:47 Drug: Rocephin - (cefTRIAXone) 1 grams Route: IVPB; Infused Over: 30 mins; Site: left   ll1 

      antecubital;                                                                                

13:30 Follow up: IV Status: Completed infusion                                                vg1 

11:47 Drug: NS 0.9% 250 ml Route: IV; Rate: bolus; Site: left antecubital;                    ll1 

13:29 Follow up: IV Status: Completed infusion                                                vg1 

12:04 Drug: Magnesium Sulfate 1 grams Route: IVPB; Infused Over: 1 hrs; Site: left            ll1 

      antecubital;                                                                                

13:29 Follow up: IV Status: Completed infusion                                                vg1 

                                                                                                  

                                                                                                  

Disposition:                                                                                      

17:11 Co-signature as Attending Physician, Jad Peterson MD.                                    rn  

                                                                                                  

Disposition:                                                                                      

21 13:41 Discharged to Home. Impression: Hematuria.                                         

- Condition is Stable.                                                                            

- Discharge Instructions: Hematuria, Adult.                                                       

- Prescriptions for cefpodoxime 100 mg Oral Tablet - take 1 tablet by ORAL route every            

  12 hours for 10 days take with food; 20 tablet.                                                 

- Medication Reconciliation Form, Thank You Letter, Antibiotic Education, Prescription            

  Opioid Use form.                                                                                

- Follow up: Private Physician; When: 2 - 3 days; Reason: Recheck today's complaints.             

- Problem is new.                                                                                 

- Symptoms have improved.                                                                         

                                                                                                  

                                                                                                  

                                                                                                  

Signatures:                                                                                       

Dispatcher [employer]                           Yvonne Paul RN RN iw Nieto, Roman, MD MD rn Page, Corey, PA                         PA   Jennyfer Balbuena RN RN   [facility]                                                  

Andrew Zimmerman RN RN   [facility]                                                  

                                                                                                  

Corrections: (The following items were deleted from the chart)                                    

13:55 13:41 2021 13:41 Discharged to Home. Impression: Hematuria. Condition is Stable.  vg1 

      Forms are Medication Reconciliation Form, Thank You Letter, Antibiotic Education,           

      Prescription Opioid Use. Follow up: Private Physician; When: 2 - 3 days; Reason:            

      Recheck today's complaints. Problem is new. Symptoms have improved. cp                      

                                                                                             

08:04  10:35 Abdomen/GI: Inspection: abdomen appears normal, Bowel sounds: active, cp    cp  

                                                                                                  

**************************************************************************************************

## 2021-02-22 NOTE — ER
Nurse's Notes                                                                                     

 [facility]                                                                 

Name: Beverly Santacruz                                                                                 

Age: 81 yrs                                                                                       

Sex: Male                                                                                         

: 1940                                                                                   

MRN: [id number]                                                                                   

Arrival Date: 2021                                                                          

Time: 09:04                                                                                       

Account#: [id number]                                                                            

Bed 25                                                                                            

Private MD:                                                                                       

Diagnosis: Hematuria                                                                              

                                                                                                  

Presentation:                                                                                     

                                                                                             

09:13 Chief complaint: Patient states: has hx of bladder cancer, has been urinating blood and iw  

      blood clots for past 8-9 days, also has pinched nerve in back that hurts when he stands     

      but he is really here for the urinary problem, states he has been feeling woozy.            

      Coronavirus screen: At this time, the client does not indicate any symptoms associated      

      with coronavirus-19. Ebola Screen: Patient negative for fever greater than or equal to      

      101.5 degrees Fahrenheit, and additional compatible Ebola Virus Disease symptoms            

      Patient denies exposure to infectious person. Patient denies travel to an                   

      Ebola-affected area in the 21 days before illness onset. No symptoms or risks               

      identified at this time. Initial Sepsis Screen: Does the patient meet any 2 criteria?       

      No. Patient's initial sepsis screen is negative. Does the patient have a suspected          

      source of infection? No. Patient's initial sepsis screen is negative. Risk Assessment:      

      Do you want to hurt yourself or someone else? Patient reports no desire to harm self or     

      others.                                                                                     

09:13 Method Of Arrival: Ambulatory                                                           iw  

09:13 Acuity: NAOMI 2                                                                           iw  

09:16 Onset of symptoms was 2021.                                                iw  

                                                                                                  

Historical:                                                                                       

- Allergies:                                                                                      

09:19 No Known Allergies;                                                                     iw  

- Home Meds:                                                                                      

09:19 clopidogrel 75 mg oral tab 1 tab once daily [Active]; atorvastatin 40 mg oral tab 1 tab iw  

      once daily [Active]; metoprolol tartrate 100 mg Oral tab [Active]; tamsulosin 0.4 mg        

      oral cp24 1 cap once daily [Active]; pantoprazole 40 mg oral TbEC 1 tab once daily          

      [Active]; aspirin 81 mg Oral chew 1 tab once daily [Active]; lactulose 20 gram/30 mL        

      Oral soln 30 mL once daily [Active]; Travatan Z 0.004 % ophthalmic drop 1 drop once         

      daily [Active];                                                                             

- PMHx:                                                                                           

09:19 Hypertension; Myocardial infarction; bladder cancer; Gastric Reflux;                    iw  

- PSHx:                                                                                           

09:19 Knee surgery; facial; back; Heart stents;                                               iw  

                                                                                                  

- Immunization history:: Adult Immunizations.                                                     

- Social history:: Smoking status: unknown.                                                       

                                                                                                  

                                                                                                  

Screenin:12 Abuse screen: Denies threats or abuse. Nutritional screening: No deficits noted.        ll1 

      Tuberculosis screening: No symptoms or risk factors identified. Fall Risk IV access (20     

      points). Ambulatory Aid- Crutches/Cane/Walker (15 pts). Total Franks Fall Scale              

      indicates Low Risk Score (25-44 pts).                                                       

                                                                                                  

Assessment:                                                                                       

10:20 General: Appears in no apparent distress. Behavior is calm, cooperative, appropriate    ll1 

      for age. Pain: Denies pain. Neuro: No deficits noted. Cardiovascular: No deficits           

      noted. GI: Abdomen is flat, Bowel sounds present X 4 quads. Abd is soft and non tender      

      X 4 quads. : Urine is tim blood, Reports bloody urine with clots.                       

12:24 Reassessment: Patient appears in no apparent distress at this time. No changes from     vg1 

      previously documented assessment. Patient and/or family updated on plan of care and         

      expected duration. Pain level reassessed. Patient is alert, oriented x 3, equal             

      unlabored respirations, skin warm/dry/pink. Patient denies pain at this time.               

13:26 Reassessment: Patient appears in no apparent distress at this time. No changes from     vg1 

      previously documented assessment. Patient and/or family updated on plan of care and         

      expected duration. Pain level reassessed. Patient is alert, oriented x 3, equal             

      unlabored respirations, skin warm/dry/pink. States after urinating pain level is about      

      3/10. Stated feels like there may be a clot at the end of the urethra. Provider             

      notified.                                                                                   

                                                                                                  

Vital Signs:                                                                                      

09:13  / 85; Pulse 91; Resp 16; Temp 98.7; Pulse Ox 98% on R/A; Weight 70.31 kg; Height iw  

      5 ft. 8 in. (172.72 cm); Pain 0/10;                                                         

11:23  / 61 Supine; Pulse 71;                                                           ll1 

11:23  / 66 Sitting; Pulse 82;                                                          ll1 

11:23  / 75 Standing; Pulse 98;                                                         ll1 

12:25  / 59; Pulse 80; Resp 20; Pulse Ox 98% on R/A;                                    vg1 

13:00  / 54; Pulse 88; Resp 20; Pulse Ox 100% on R/A;                                   vg1 

09:13 Body Mass Index 23.57 (70.31 kg, 172.72 cm)                                             iw  

                                                                                                  

ED Course:                                                                                        

09:04 Patient arrived in ED.                                                                  ds1 

09:16 Triage completed.                                                                       iw  

10:07 Antony Danielle PA is PHCP.                                                                cp  

10:07 Jad Peterson MD is Attending Physician.                                                cp  

10:24 Andrew Zimmerman, RN is Primary Nurse.                                                     1 

11:00 Urine Dipstick--Ancillary (enter results) Sent.                                         mh5 

11:00 Procalcitonin Sent.                                                                     5 

11:00 Ptt, Activated Sent.                                                                    5 

11:01 Basic Metabolic Panel Sent.                                                             5 

11:01 CBC with Diff Sent.                                                                     5 

11:01 LFT's Sent.                                                                             5 

11:01 Magnesium Sent.                                                                         5 

11:01 NT PRO-BNP Sent.                                                                        5 

11:01 PT-INR Sent.                                                                            5 

11:01 Troponin (emerg Dept Use Only) Sent.                                                    5 

11:01 Urine Microscopic Only Sent.                                                            5 

11:01 Initial lab(s) drawn, by me, sent to lab. Urine collected: clean catch specimen, blood  5 

      tinged, EKG done, by ED staff, reviewed by Antony PALACIOS. Inserted saline lock: 20          

      gauge in left antecubital area, using aseptic technique. Blood collected.                   

11:02 Patient has correct armband on for positive identification. Bed in low position. Call   [facility] 

      light in reach. Side rails up X 1. Warm blanket given. Cardiac monitor on. Pulse ox on.     

      NIBP on.                                                                                    

11:43 CT Stone Protocol In Process Unspecified.                                               EDMS

12:20 Primary Nurse role handed off by Andrew Zimmerman, RN                                      vg1 

12:20 Jennyfer Gustafson, RN is Primary Nurse.                                                  vg1 

13:01 paged Dr Bazzi, at [phone].                                                        bd  

13:29 Private physician returned call at 13:30.                                               bd  

13:54 No provider procedures requiring assistance completed. IV discontinued, intact,         vg1 

      bleeding controlled, No redness/swelling at site. Pressure dressing applied.                

                                                                                                  

Administered Medications:                                                                         

11:38 Not Given (Physician Discretion): NS 0.9% 500 ml IV at 500 ml/hr once                   cp  

11:47 Drug: Rocephin - (cefTRIAXone) 1 grams Route: IVPB; Infused Over: 30 mins; Site: left   ll1 

      antecubital;                                                                                

13:30 Follow up: IV Status: Completed infusion                                                vg1 

11:47 Drug: NS 0.9% 250 ml Route: IV; Rate: bolus; Site: left antecubital;                    ll1 

13:29 Follow up: IV Status: Completed infusion                                                vg1 

12:04 Drug: Magnesium Sulfate 1 grams Route: IVPB; Infused Over: 1 hrs; Site: left            ll1 

      antecubital;                                                                                

13:29 Follow up: IV Status: Completed infusion                                                vg1 

                                                                                                  

                                                                                                  

Outcome:                                                                                          

13:41 Discharge ordered by MD.                                                                cp  

13:54 Discharged to home with crutches.                                                       vg1 

13:54 Condition: stable                                                                           

13:54 Discharge instructions given to patient, Instructed on discharge instructions, follow       

      up and referral plans. medication usage, Demonstrated understanding of instructions,        

      follow-up care, medications, Prescriptions given X 1.                                       

13:55 Patient left the ED.                                                                    vg1 

                                                                                                  

Signatures:                                                                                       

Dispatcher [employer]                           EDMS                                                 

Tiffani Desai Demi                                ds1                                                  

Yvonne Sim RN                     RN   Antony Medrano PA PA cp Martinez, Maria                              [facility]                                                  

Jennyfer Gustafson RN                    RN   vg1                                                  

Andrew Zimmerman RN                       RN   ll1                                                  

                                                                                                  

Corrections: (The following items were deleted from the chart)                                    

11:27 11:23  / 75; Pulse 98bpm; ll1                                                     ll1 

                                                                                                  

**************************************************************************************************

## 2021-02-22 NOTE — XMS REPORT
Clinical Summary

                          Created on:2021



Patient:Beverly Santacruz

Sex:Male

:1940

External Reference #:[id number]





Demographics







                          Address                   [address] 



                                                    [place] [address]

 

                          Mobile Phone              [phone]

 

                          Home Phone                [phone]

 

                          Email Address             [email or web address]

 

                          Preferred Language        English

 

                          Marital Status            Unknown

 

                          Baptism Affiliation     Unknown

 

                          Race                      White

 

                          Ethnic Group              Not  or 









Author







                          Organization              [facility]

 

                          Address                   [address] [place] [address]









Support







                Name            Relationship    Address         Phone

 

                Debbi Guzman     Unavailable     Unavailable     [phone]









Care Team Providers







                    Name                Role                Phone

 

                    Unavailable         Primary Care Provider Unavailable









Allergies

No Known Allergies



Medications







          Medication Sig       Dispensed Refills   Start Date End Date  Status

 

          tamsulosin (FLOMAX) Take 0.4 mg           0                           

  Active



          0.4 mg Cap 24 hr by mouth                                          



          capsule   daily.                                            

 

          multivitamin Take 1              0                             Active



          capsule   capsule by                                         



                    mouth daily.                                         

 

          aspirin 81 MG EC Take 81 mg           0                             Ac

tive



          tablet    by mouth                                          



                    daily.                                            

 

          lactulose Take 20 g by           0                             Active



          (CHRONULAC) 10 mouth daily.                                         



          gram/15 mL (15 mL)                                                   



          solution                                                    

 

          pantoprazole Take 40 mg           0                             Active



          (PROTONIX) 40 MG by mouth                                          



          tablet    daily.                                            

 

          niacin (NIASPAN) Take 1,000           0                             Ac

tive



          1000 MG CR tablet mg by mouth                                         



                    2 (two)                                           



                    times daily.                                         

 

          atorvastatin Take 40 mg           0                             Active



          (LIPITOR) 40 MG by mouth                                          



          tablet    daily.                                            

 

          metoprolol Take 100 mg           0                             Active



          (TOPROL-XL) 100 MG by mouth                                          



          24 hr tablet daily.                                            

 

          travoprost Place 1 drop           0                             Active



          (TRAVATAN Z) 0.004 into both                                         



          % Drop ophthalmic eyes                                              



          drops     nightly.                                          

 

          phenylephrine 1 spray by           0                             Activ

e



          (UZAIR-SYNEPHRINE) 1 Each Nare                                         



          % Spry nasal spray route every                                        

 



                    6 (six)                                           



                    hours as                                          



                    needed.                                           

 

          senna-docusate Take 1    14 tablet 0         2020 Disc

ontinued



          (SENOKOT S) 8.6-50 tablet by                               0         



          mg per tablet mouth                                             



                    nightly for                                         



                    14 days.                                          

 

          acetaminophen-codei Take 1    15 tablet 0         2020

 Discontinued



          ne (TYLENOL-CODEINE tablet by                               0         

(Reorder)



          #3) 300-30 mg per mouth every                                         



          tablet    4 (four)                                          



                    hours as                                          



                    needed for                                         



                    up to 10                                          



                    days. Max                                         



                    Daily                                             



                    Amount: 6                                         



                    tablets                                           

 

          levoFLOXacin Take 1    3 tablet  0         2020 Discon

tinued



          (LEVAQUIN) 500 MG tablet (500                               0         

(Reorder)



          tablet    mg total) by                                         



                    mouth daily                                         



                    for 3 days                                         



                    Start day                                         



                    before                                            



                    urology                                           



                    clinic.                                           

 

          acetaminophen-codei Take 1    15 tablet 0         2020

 



          ne (TYLENOL-CODEINE tablet by                               0         



          #3) 300-30 mg per mouth every                                         



          tablet    4 (four)                                          



                    hours as                                          



                    needed for                                         



                    up to 10                                          



                    days. Max                                         



                    Daily                                             



                    Amount: 6                                         



                    tablets                                           

 

          senna-docusate Take 1    14 tablet 0         2020 Expi

red



          (SENOKOT S) 8.6-50 tablet by                               0         



          mg per tablet mouth                                             



                    nightly for                                         



                    14 days.                                          

 

          levoFLOXacin Take 1    3 tablet  0         2020 

d



          (LEVAQUIN) 500 MG tablet (500                               0         



          tablet    mg total) by                                         



                    mouth daily                                         



                    for 3 days                                         



                    Start day                                         



                    before                                            



                    urology                                           



                    clinic.                                           

 

          clopidogreL Take 75 mg           0                    Discont

inued



          (PLAVIX) 75 mg by mouth                                1         (Dorothea

ent



          tablet    daily.                                            Discharge)







Active Problems







                          Problem                   Noted Date

 

                          Bladder cancer            2020

 

                          Bladder diverticulum      2020

 

                          Bladder tumor             2020







Encounters







             Date         Type         Specialty    Care Team    Description

 

             2021   Hospital Encounter Pre-Admission Testing              

 

             2021   Travel                                 

 

             2020   Anesthesia Event General Internal Lakhwinder Irving Medicine MD           

 

             2020   Anesthesia Event              Lakhwinder Irving MD           

 

             2020   Surgery                   Jluis,       CYSTECTOMY



                                                    MD Marcus 

 

             2020 - Hospital Encounter General Internal Jluis,       



             2020                Medicine     MD Marcus 

 

             2020   Travel                                 



after 2020



Social History







             Tobacco Use  Types        Packs/Day    Years Used   Date

 

             Former Smoker              1                         Quit: 









                Smokeless Tobacco: Never Used                                 









                Alcohol Use     Drinks/Week     oz/Week         Comments

 

                Yes                                             occasionally









                          Sex Assigned at Birth     Date Recorded

 

                          Not on file               









                    COVID-19 Exposure   Response            Date Recorded

 

                    In the last month, have you been in contact with No / Unsure

         2021  2:54 PM 

CST



                    someone who was confirmed or suspected to have              

       



                    Coronavirus / COVID-19?                     







Last Filed Vital Signs







                Vital Sign      Reading         Time Taken      Comments

 

                Blood Pressure  110/53          2020 12:00 PM CST 

 

                Pulse           71              2020 12:00 PM CST 

 

                Temperature     36.4 C (97.5 F) 2020 12:00 PM CST 

 

                Respiratory Rate 18              2020 12:00 PM CST 

 

                Oxygen Saturation 97%             2020 12:00 PM CST 

 

                Inhaled Oxygen Concentration -               -               

 

                Weight          70.3 kg (155 lb) 2021  2:50 PM CST 

 

                Height          172.7 cm (5' 8") 2021  2:50 PM CST 

 

                Body Mass Index 23.57           2021  2:50 PM CST 







Plan of Treatment







                Health Maintenance Due Date        Last Done       Comments

 

                HEPATITIS C SCREENING 1960                      

 

                DTAP/TDAP/TD VACCINES (1 - Tdap) 1961                     

 

 

                SHINGLES VACCINES (1 of 2) 1992                      

 

                MEDICARE ANNUAL WELLNESS (YEAR 2 or 2008                  

    



                FIRST YEAR if no IPPE)                                 

 

                DEPRESSION SCREENING (12+) 2021                      

 

                PNEUMOCOCCAL 65+ YRS Completed       2019      

 

                INFLUENZA VACCINE Completed       10/15/2020, 12/15/2019, 



                                                2019      







Implants







          Implanted Type      Area       Device Identifier Shelf    

 Model /



                                                            Expiration Serial /



                                                            Date      Lot

 

             Memb [employer] Nabeel Briggs 5x6  430- - [id number] IMPLANTS     N/A

:         GENZYME TIMO: 

[id number]            2022                430-02 /



                                        Implanted: Qty: 1 on 2020 by Marcus Bazzi MD at [facility]            Bladder    BIO-SURG                          /



                                                                      [id number]

 

          Contour Injection Stent Set           Left:     BOSTON               [id number] /



                                        Implanted: Qty: 1 on 2020 by Marcus Bazzi MD at [facility]     SCIENTIFIC                        /



                                                                      [id number]







Procedures







             Procedure Name Priority     Date/Time    Associated   Comments



                                                    Diagnosis    

 

             RHYTHM STRIP - SCAN              2020  4:02              



                                       PM CST                    

 

             BASIC METABOLIC PANEL Routine      2020  6:32              Re

sults for this



             (7)                       AM CST                    procedure are i

n



                                                                 the results



                                                                 section.

 

             CBC (HEMOGRAM ONLY) Routine      2020  4:33              Resu

lts for this



                                       AM CST                    procedure are i

n



                                                                 the results



                                                                 section.

 

             CBC (HEMOGRAM ONLY) Routine      2020  4:30              Resu

lts for this



                                       AM CST                    procedure are i

n



                                                                 the results



                                                                 section.

 

             BASIC METABOLIC PANEL Routine      2020  4:30              Re

sults for this



             (7)                       AM CST                    procedure are i

n



                                                                 the results



                                                                 section.

 

             TRANSFUSION SERVICE              2020  6:02              



             REPORT - SCAN              PM CST                    

 

             INTRAOPERATIVE PATH              2020  2:21              



             REPORT - SCAN              PM CST                    

 

             CBC (HEMOGRAM ONLY) Routine      2020  4:15              Resu

lts for this



                                       AM CST                    procedure are i

n



                                                                 the results



                                                                 section.

 

             BASIC METABOLIC PANEL Routine      2020  4:15              Re

sults for this



             (7)                       AM CST                    procedure are i

n



                                                                 the results



                                                                 section.

 

             CBC (HEMOGRAM ONLY) Routine      2020  1:52              Resu

lts for this



                                       PM CST                    procedure are i

n



                                                                 the results



                                                                 section.

 

             BASIC METABOLIC PANEL Routine      2020  1:52              Re

sults for this



             (7)                       PM CST                    procedure are i

n



                                                                 the results



                                                                 section.

 

             TISSUE EXAM  AP Routine   2020 10:20              Results for

 this



                                       AM CST                    procedure are i

n



                                                                 the results



                                                                 section.

 

             MO AN EPIDURAL CATH - Routine      2020  9:05              Re

sults for this



             NO CHARGE                 AM CST                    procedure are i

n



                                                                 the results



                                                                 section.

 

             DIVERTICULECTOMY,BLADD              2020  8:19 Bladder tumor 



             ER                        AM CST                    









                                                    Case Notes







                                                    4 HRS









             CYSTECTOMY                2020  8:19 AM CST Bladder tumor 









                                                    Case Notes







                                                    4 HRS









             ABORH, MANUAL STAT         2020  6:11 AM CST              Res

ults for this



                                                                 procedure are i

n the



                                                                 results section

.

 

             POCT-HEMOGLOBIN METER Routine      2020  6:03 AM CST         

     Results for this



                                                                 procedure are i

n the



                                                                 results section

.

 

             BASIC METABOLIC PANEL (7) STAT         2020  5:58 AM CST     

         Results for this



                                                                 procedure are i

n the



                                                                 results section

.

 

             TYPE AND SCREEN, AUTOMATED Routine      2020  5:54 AM CST    

          Results for this



                                                                 procedure are i

n the



                                                                 results section

.



after 2020



Results

RHYTHM STRIP - SCAN (2020  4:02 PM CST)





                          Narrative                 Performed At

 

                                        This result has an attachment that is no

t available.



                                        



Basic Metabolic Panel - In AM (2020  6:32 AM CST)Only the most recent of5 
resultswithin the time period is included.





             Component    Value        Ref Range    Performed At Pathologist



                                                                 Signature

 

             Sodium       136          136 - 145 meq/L [facility]       

 

             Potassium    3.3 (L)      3.5 - 5.1 meq/L [facility]       

 

             Chloride     105          98 - 107 meq/L [facility]       

 

             CO2          25           22 - 29 meq/L [facility]       

 

             BUN          5 (L)        7 - 21 mg/dL [facility]       

 

             Creatinine   0.72         0.57 - 1.25  [facility] 



                                       mg/dL        [facility]       

 

             Glucose      122 (H)      70 - 105 mg/dL [facility]       

 

             Calcium      9.0          8.4 - 10.2   [facility] 



                                       mg/dL        [facility]       

 

             EGFR         106Comment:  mL/min/1.73 sq [facility] 



                          ESTIMATED GFR IS NOT [facility] 



                          AS ACCURATE AS              CENTER       



                          CREATININE CLEARANCE                           



                          IN PREDICTING                           



                          GLOMERULAR                             



                          FILTRATION RATE.                           



                          ESTIMATED GFR IS NOT                           



                          APPLICABLE FOR                           



                          DIALYSIS PATIENTS.                           









                                        Specimen

 

                                        Blood - Entire right upper arm (body str

ucture)









                          Narrative                 Performed At

 

                           ID - PIAYA L     [facility]

ICAL CENTER









                Performing Organization Address         City/State/Zipcode Phone

 Number

 

                [facility] [address] [place]

 [address] [phone]



                CENTER                                          



CBC (Hemogram only) (2020  4:33 AM CST)Only the most recent of4 results
within the time period is included.





             Component    Value        Ref Range    Performed At Pathologist Sig

nature

 

             WBC          8.6          3.5 - 10.5 K/L [facility] 

 

             RBC          3.47 (L)     4.63 - 6.08 M/L [facility] 

 

             Hemoglobin   11.7 (L)     13.7 - 17.5 GM/DL [facility] 

 

             Hematocrit   34.4 (L)     40.1 - 51.0 % [facility] 

 

             MCV          99.1 (H)     79.0 - 92.2 fL [facility] 

 

             MCH          33.7 (H)     25.7 - 32.2 pg [facility] 

 

             MCHC         34.0         32.3 - 36.5 GM/DL [facility] 

 

             RDW          12.9         11.6 - 14.4 % [facility] 

 

             Platelets    141 (L)      150 - 450 K/CU MM [facility] 

 

             MPV          10.7         9.4 - 12.4 fL [facility] 

 

             nRBC         0            0 - 0 /100 WBC [facility] 









                                        Specimen

 

                                        Blood - Entire right upper arm (body str

ucture)









                Performing Organization Address         City/State/Zipcode Phone

 Number

 

                [facility] [address] [place]

 [address] [phone]



                CENTER                                          



TRANSFUSION SERVICE REPORT - SCAN (2020  6:02 PM CST)





                          Narrative                 Performed At

 

                                        This result has an attachment that is no

t available.



                                        



INTRAOPERATIVE PATH REPORT - SCAN (2020  2:21 PM CST)





                          Narrative                 Performed At

 

                                        This result has an attachment that is no

t available.



                                        



Tissue Exam (2020 10:20 AM CST)





             Component    Value        Ref Range    Performed At Pathologist



                                                                 Signature

 

                          Case Report               Surgical Pathology Report 

            Case: 

[id number]                                     CH

I [facility]       



                                                    Authorizing Provider: Marcus Quintero MD    Collected:     

2020 1020                           [facility]          



                                                    Ordering Location:   [facility] PERIOPERATIVE     Received:     

 2020 1129                           [facility]      



                                                              

  SERVICES             

                                      

                       



                                                    Pathologist:      

Ricky Dasilva MD               

                                    

                          



                                                    Specimens:  A) - Fat Pad, 

ANDRÉS-VESICAL FAT            

                                                

        



                                                           B) - Blad

mesha Biopsy, left bladder diverticulum     

                                                  

     



                                                                 

 

             DIAGNOSIS    A. LYMPH NODE, LABELED "ANDRÉS-VESICAL FAT", EXCISION:  

            [facility] 

Electronically



                             - ONE BENIGN LYMPH NODE (0/1)              [facility] B

   signed by [facility] Ricky erazo MD



                                                    B. URINARY BLADDER, LEFT DIV

ERTICULUM, OPEN PARTIAL CYSTECTOMY, 

DIVERTICULECTOMY:                                           on 2020 at



                                                       - UROTHELIAL CARCINOMA, H

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 

ADIPOSE TISSUE                                              5:37 PM



                                Signing Pathologist Direct Phone Line: [phone]                           

 

             COMMENT      The tumor invades              [facility] 



                          the superficial fat              [facility]   



                          around the                [facility] 



                          diverticulum but is                           



                          well clear of the                           



                          margins in the soft                           



                          tissue.                                

 

                          SYNOPTIC REPORT           URINARY BLADDER: Cystectomy,

 Anterior Exenteration (Bladder - 

All Specimens)                          [facility]   



                          8th Edition - Protocol posted: 2019              

MEDICAL CENTER 



                                                                 



                          SPECIMEN                               



                            Procedure:  Partial cystectomy                

           



                                                                 



                          TUMOR                                  



                            Tumor Site:  left urinary bladder diverticulum

                           



                            Histologic Type:  Urothelial carcinoma invasiv

e                           



                            Histologic Grade:  High-grade                 

          



                            Tumor Size:  Greatest Dimension (Centimeters):

 4 cm                           



                             Additional Dimension (Centimeters):  2 cm  

                         



                             Additional Dimension (Centimeters):  0.7 cm

                           



                            Tumor Extension:  Tumor invades perivesical so

ft tissue                           



                             :  Microscopically                         

  



                            Lymphovascular Invasion:  Not identified      

                     



                            Tumor Configuration:  Solid / nodule          

                 



                                                                 



                          MARGINS                                



                                                      Margins:  Uninvolved

 by invasive carcinoma and carcinoma in situ / 

noninvasive urothelial carcinoma                                         



                                                                 



                          LYMPH NODES                            



                            Number of Lymph Nodes Involved:  0            

               



                            Number of Lymph Nodes Examined:  1            

               



                                                                 



                          PATHOLOGIC STAGE CLASSIFICATION (pTNM, AJCC 8th Editio

n)                           



                            Primary Tumor (pT):  pT3a                     

      



                            Regional Lymph Nodes (pN):  pN0               

            



                                                                 



                          ADDITIONAL FINDINGS                           



                            Associated Epithelial Lesions:  None identifie

d                           



                            Additional Pathologic Findings:  Inflammation 

/ regenerative changes                           

 

             CPT Code(s)  78983                     [facility] 



                          [address]                     [address] 

 

             CLINICAL HISTORY Pre and postop diagnosis: bladder tumor           

   [facility] 



                                                    Procedure: cystectomy, diver

ticulectomy, bladder and dissection lymph node 

pelvic                                  [facility] 

 

             SPECIMEN SOURCE A. Fat pad tissue,              [facility] 



                          perivesical fat; B.              [facility]   



                          Bladder biopsy [facility] 



                          bladder diverticulum                           

 

                          GROSS DESCRIPTION         A.  The specimen is received

 in formalin labeled with the 

patient's name, accession number and "fat pad" and consists of 3 x 2 x 0.5 cm 
tan-yellow and pink fibrofatty tissue in aggregate.  No lymph node              

             [facility]                                  



                                                     is grossly identified.  The

 specimen is submitted entirely in cassettes A1-A2.

                                        [facility] 



                                                    B.  The specimen is received

 fresh for intraoperative consultation diagnosis 

labeled with the patient's name, accession number, and "bladder biopsy tissue" 
consists of 5 x 4.9 x 1.2 cm tan-pink bladder                                   

      



                                                    diverticulum tissue with 0.6

 x 0.5 cm opening which is inked blue.  The outer 

surface is tan-pink, hyperemic and ragged with no grossly visible tumor.  The 
specimen is oriented per surgeon and inked.  F                                  

       



                                                    rozen section is performed. 

 Upon opening there is a 4 x 2 cm tan-brown, 

necrotic tumor present. The tumor is tan-pink and brown.  It is located 0.4 cm 
from nearest margin (diverticulum neck, inked blue                              

           



                                                    ).  The specimen is cross se

ctioned, the tumor is 0.7 cm thick and abuts the 

inked outer fatty tissue.  The tumor is located 2 cm from the anterior wall, 1.2
cm to the posterior wall, 2.2 cm to proximal wall, and abuts the distal wall.   

                                      



                                                                 



                          Representative sections are submitted.                

           



                                                                 



                          Ink code:                              



                          Blue-surgical margin (diverticulum neck).             

              



                          Black-anterior wall.                           



                          Orange-posterior wall.                           



                          Green-proximal wall                           



                          Violet-distal wall, next to the margin                

           



                          Blue-distal wall                           



                                                                 



                                                    Section code:  FSB1 - Divert

iculum neck margin, en face;  B2-B6 bisected tumor 

to diverticulum neck margin, perpendicular sections (B4-B5 area closest to the 
margin).  B7 diverticulum neck margin, remai                                    

     



                                                    jhonny perpendicular sections.

  B8-B12 remaining tumor, serially sectioned 

submitted entirely; B13 representative sections of normal mucosa, anterior and 
posterior wall.  B14 representative section of normal mucosa, proximal wall.  
HS/bc                                                       

 

             INTRAOPERATIVE FSA1, BLADDER DIVERTICULUM, EXCISION:              C

[facility] 



             CONSULTATION   - NECK MARGIN, NEGATIVE FOR MALIGNANCY              

[facility] 



                          Results reported to Dr. Bazzi at 11:52 a.m., by Dr. Annmarie gregorio on 2020.                           

 

             MICROSCOPIC  Performed.                [facility] 









                                        Specimen

 

                                        Tissue - Fat Pad

 

                                        Tissue specimen (specimen) - Bladder Bio

psy









                Performing Organization Address         City/State/Zipcode Phone

 Number

 

                [facility] [address] [place]

 [address] [phone]



                CENTER                                          



ANESTHESIA CSE BLOCK (2020  9:05 AM CST)





                          Narrative                 Performed At

 

                                        Lakhwinder Irving MD   2020 9:

10 AM



                                        



                                        CSE Block



                                        



                                         



                                        



                                        Patient location during procedure: OR



                                        



                                        Start time: 2020 8:46 AM



                                        



                                        End time: 2020 8:50 AM 



                                        



                          Procedure Indication: procedure for pain, at surgeon's

 request and 



                                        post-op 



                                        



                                        pain management



                                        



                                         



                                        



                                        Staffing



                                        



                                        Anesthesiologist: Lakhwinder Irving MD



                                        



                                        Performed: personally 



                                        



                                         



                                        



                                        Preanesthetic Checklist



                                        



                                        Completed: patient identified, pre-op ev

aluation, timeout performed, IV 



                                        



                                        checked, risks and benefits discussed, m

onitors and equipment checked, 



                                        



                                        anesthesia consent given, prep site dry 

prior to draping and maximum 



                                        



                          sterile barriers were used: cap, mask, sterile gown, s

terile gloves, and 



                                        



                                        



                                        large sterile sheet



                                        



                                         



                                        



                                        Prep



                                        



                                        Prep: Betadine



                                        



                          Procedures: sterile gloves, surgical mask, surgical ha

t, sterile 



                                        technique 



                                        



                                        and prep and sterile drape applied



                                        



                                         



                                        



                                        CSE Block



                                        



                                        Patient position: sitting



                                        



                                        Patient monitoring: EKG, HR, BP and SpO2



                                        



                                        Approach: midline



                                        



                                        landmark technique and landmark techniqu

e



                                        



                                        Injection technique: GENA airNo pictures 

available



                                        



                                        Region: lumbar



                                        



                                        Level: 3-4



                                        



                                         



                                        



                                        Spinal Needle



                                        



                                        Needle type: Pencan



                                        



                                        Needle gauge: 25 G



                                        



                                        Needle length: 12 cm



                                        



                                        Introducer used



                                        



                                         



                                        



                                        Epidural Needle



                                        



                                        Needle type: Tuohy 



                                        



                                        Needle gauge: 17 G



                                        



                                        Needle length: 9 cm



                                        



                                         



                                        



                                        Catheter



                                        



                                        Catheter type: side hole



                                        



                                        Catheter size: 19 G



                                        



                                         



                                        



                          Assessmentinjection not painful, no injection resistan

ce, no 



                                        paresthesia, 



                                        



                                        no cerebrospinal fluid, no intravascular

 injection, no epidural blood 



                                        



                                        return, no intrathecal medication inject

ion and no barbotage



                                        



                                        patient tolerated the procedure well, pa

tient had no immediate 



                                        



                                        complications and patient had adequate l

evel of anesthesia and negative 



                                        



                                        Allis clamp test



                                        



                                         



                                        



                                        Additional Notes



                                        



                          Patient tolerated well. No pain on injection or thro

ughout procedure. 



                                        



                                        



                                         



                                        



                                                    









                                        Procedure Note

 

                                        Lakhwinder Irving MD - 2020  9:05 A

M CST



CSE Block



                                        



                                        Patient location during procedure: OR



                                        Start time: 2020 8:46 AM



                                        End time: 2020 8:50 AM



                                        Procedure Indication: procedure for pain

, at surgeon's request and post-op pain 

management



                                        



                                        Staffing



                                        Anesthesiologist: Lakhwinder Irving MD



                                        Performed: personally



                                        



                                        Preanesthetic Checklist



                                        Completed: patient identified, pre-op ev

aluation, timeout performed, IV checked,

risks and benefits discussed, monitors and equipment checked, anesthesia consent
given, prep site dry prior to draping and maximum



                                        sterile barriers were used: cap, mask, s

terile gown, sterile gloves, and large 

sterile sheet



                                        



                                        Prep



                                        Prep: Betadine



                                        Procedures: sterile gloves, surgical mas

k, surgical hat, sterile technique and 

prep and sterile drape applied



                                        



                                        CSE Block



                                        Patient position: sitting



                                        Patient monitoring: EKG, HR, BP and SpO2



                                        Approach: midline



                                        landmark technique and landmark techniqu

e



                                        Injection technique: GENA airNo pictures 

available



                                        Region: lumbar



                                        Level:  3-4



                                        



                                        Spinal Needle



                                        Needle type: Pencan



                                        Needle gauge: 25 G



                                        Needle length: 12 cm



                                        Introducer used



                                        



                                        Epidural Needle



                                        Needle type: Tuohy



                                        Needle gauge: 17 G



                                        Needle length: 9 cm



                                        



                                        Catheter



                                        Catheter type: side hole



                                        Catheter size: 19 G



                                        



                                        Assessmentinjection not painful, no inje

ction resistance, no paresthesia, no 

cerebrospinal fluid, no intravascular injection, no epidural blood return, no 
intrathecal medication injection and no barbotage



                                        patient tolerated the procedure well, pa

junie had no immediate complications and

patient had adequate level of anesthesia and negative Allis clamp test



                                        



                                        Additional Notes



                                        Patient tolerated well.  No pain on inje

ction or throughout procedure.



ABORH, manual (2020  6:11 AM CST)





             Component    Value        Ref Range    Performed At Pathologist Sig

nature

 

             ABO Grouping O                         [facility]       

 

             Rh Factor    POS                       [facility]       









                                        Specimen

 

                                        Blood









                Performing Organization Address         City/[facility]/Zipcode Phone

 Number

 

                [address] [address] [phone]



POC-Hemoglobin meter (2020  6:03 AM CST)





             Component    Value        Ref Range    Performed At Pathologist



                                                                 Signature

 

             POC-Hemoglobin 13.9Comment: 13.0 - 16.8  [facility] 



             Meter        TESTED AT [facility] g/dL         [address] [address]                           









                                        Specimen

 

                                        Blood









                Performing Organization Address         City/[facility]/Zipcode Phone

 Number

 

                [address]

 [address] [phone]



                CENTER                                          



Type and screen, automated (2020  5:54 AM CST)





             Component    Value        Ref Range    Performed At Pathologist Sig

nature

 

             ABO/RH AUTOMATED O POSITIVE                [facility] 



             (BEAKER)                               [facility] 

 

             Ab Scrn      NEGATIVE                  [facility] 









                                        Specimen

 

                                        Blood









                Performing Organization Address         City/State/Zipcode Phone

 Number

 

                [address] [address] [phone]



after 2020



Insurance







          Payer     Benefit Plan / Subscriber ID Effective Dates Phone     Addre

ss   Type



                    Group                                             

 

          MEDICARE  MEDICARE A B [id number] 2007-Present                   

  Medicare

 

          [employer]ET[employer] - MGD CARE AETNA INDEMNITY [id number] 2001-Present           

          Comm



                    NON CONTR                                         









           Guarantor Name Account Type Relation to Date of    Phone      Billing



                                 Patient    Birth                 Address

 

           Beverly Santacruz Personal/Family Self       1940 [phone] [address]                                     (Home)     [address] 







                                                                  [place] [address]







Advance Directives

For more information, please contact: [phone]





                Code Status     Date Activated  Date Inactivated Comments

 

                Full Code       2020  2:45 PM 2020  4:38 PM 









                    This code status was determined by: Patient

## 2021-02-22 NOTE — XMS REPORT
Continuity of Care Document

                          Created on:2021



Patient:DELFINO ADLER

Sex:Male

:1940

External Reference #:[id number]





Demographics







                          Address                   [address] 



                                                    [place] [address]

 

                          Home Phone                [phone]

 

                          Mobile Phone              [phone]

 

                          Email Address             [email or web address]

 

                          Preferred Language        English

 

                          Marital Status            Unknown

 

                          Adventist Affiliation     Unknown

 

                          Race                      Unknown

 

                          Additional Race(s)        Unavailable



                                                    White

 

                          Ethnic Group              Unknown









Author







                          Organization              [facility]

t

 

                          Address                   [address]



                                                    [place] [address]

 

                          Phone                     [phone]









Support







                Name            Relationship    Address         Phone

 

                Megan            Daughter        Unavailable     [phone]

 

                Noam           Child           [address]   [phone]



                                                [place] [address] 









Care Team Providers







                    Name                Role                Phone

 

                    Brian SNIDER,  Lidia   Primary Care Physician [phone]

 

                    Jim Valdez MD     Attending Clinician [phone]

 

                    Erick SNIDER            Attending Clinician [phone]

 

                    MAYTE Randhawa MD        Attending Clinician [phone]

 

                    Shannan MAGANA               Attending Clinician Unavailable

 

                    Provider            Attending Clinician Unavailable

 

                    Juan Manuel MAGANA        Attending Clinician Unavailable

 

                    ERICK               Attending Clinician Unavailable

 

                    Erick SNIDER            Attending Clinician [phone]

 

                    Aristides SNIDER          Attending Clinician [phone]

 

                    JOSE                Admitting Clinician Unavailable

 

                    ERICK               Admitting Clinician Unavailable









Payers







           Payer Name Policy Type Policy     Effective Date Expiration Date Sour

ce



                                 Number                           

 

           MEDICAREMEDICARE A            [id number] 2007            JADIEL Saavedra



           [id number]                       00:00:00              - M

edical



           [facility]                                             [facility]

 

           AETNA - MGD CAREAETNA            [id number]  2001            JADIEL Brown



           INDEMNITY NON                       00:00:00              - Medical



           [id number]                                             [place]

ter



           [facility]                                             

 

           MEDICAREMEDICARE PART            [id number] 2007            Speedy HARTMANN AND                            00:00:00              [place]



           [id number]                                             



           [place]                                             

 

           AETNAAETNA            [id number]     2001            [facility]



           HMO,POS,EPO,                       00:00:00              [place]



           [id number]                                             



           resentHMO                                              







Problems







       Condition Condition Condition Status Onset  Resolution Last   Treating Co

mments 

Source



       Name   Details Category        Date   Date   Treatment Clinician        



                                                 Date                 

 

       Cardiomyop Cardiomyop Disease Active                              [facility]

[place]



       athy   athy                 6-30                               [facility]



                                   00:00:                             st



                                   00                                 

 

       Bladder Bladder Disease Active                              [facility]



       cancer cancer               2-25                               [facility]



                                   00:00:                             Medical



                                   00                                 [place]

 

       Bladder Bladder Disease Active                              [facility]



       diverticul diverticul               1-21                               [facility]

[facility] -



       um     um                   00:00:                             Medical



                                   00                                 [place]

 

       Bladder Bladder Disease Active                              [facility]



       tumor  tumor                1-21                               [facility]



                                   00:00:                             Medical



                                   00                                 [place]

 

       Ischemic Ischemic Disease Active 2019                             [place]

on



       cardiomyop cardiomyop               0-08                               Me

[facility]



       athy   athy                 00:00:                             st



                                   00                                 

 

       Chronic Chronic Disease Active                              [place]



       systolic systolic               2-13                               Method

i



       congestive congestive               00:00:                             st



       heart  heart                00                                 



       failure failure                                                  

 

       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



       native native               2-13                               [facility]



       artery artery               00:00:                             st



                                   00                                 

 

       Stented Stented Disease Active                              [place]



       coronary coronary               2-13                               Method

i



       artery artery               00:00:                             st



                                   00                                 

 

       AICD   AICD   Disease Active                              [place]



       (automatic (automatic               2-13                               Me

thodi



       cardiovert cardiovert               00:00:                             st



       er/defibri er/defibri               00                                 



       llator) llator)                                                  



       present present                                                  







Allergies, Adverse Reactions, Alerts

This patient has no known allergies or adverse reactions.



Social History







           Social Habit Start Date Stop Date  Quantity   Comments   Source

 

           History of tobacco                       Current smoker            

DEMETRIS Brown -



           use                                                    [facility]

 

           Sex Assigned At                                              [facility] -



           Birth                                                  [facility]

 

           Exposure to                       Not sure              JADIEL Brown 

-



           SARS-CoV-2 (event)                                             [facility]

[facility]

 

           Cigarettes smoked 2021                       JADIEL Brown -



           current (pack per 00:00:00   00:00:00                         Medical

 Center



           day) - Reported                                             

 

           Tobacco use and 2021 Never used            JADIEL Ornelas -



           exposure   00:00:00   00:00:00                         [facility]

 

           Alcohol intake 2021 Current drinker of            

DEMETRIS Brown -



                      00:00:00   00:00:00   alcohol (finding)            Medical

 Center

 

           Alcohol Comment 2020 occasionally            [facility] 

[facility]



                      00:00:00   00:00:00                         [facility]









                Smoking Status  Start Date      Stop Date       Source

 

                Former smoker   2021 00:00:00 2021 00:00:00 [facility]

[facility]

 

                Never smoker                                    [facility]

t







Medications







       Ordered Filled Start  Stop   Current Ordering Indication Dosage Frequency

 Signature

                    Comments            Components          Source



     Medication Medication Date Date Medication? Clinician                (SIG) 

          



     Name Name                                                   

 

     clopidogreL                   75mg QD   Take 75 mg          

 [facility]



     (PLAVIX) 75      2-08 02-08                          by mouth           [facility]

es -



     mg tablet      14:30: 14:30                          daily.           Medic

al



               15   :12                                          [place]

 

     aspirin 81            Yes            81mg QD   Take 81 mg           C

[facility]



     MG EC      2-01                               by mouth           [facility]



     tablet      14:43:                               daily.           [address]

 

     niacin            Yes            1000mg Q.5D Take 1,000           [facility]



     (NIASPAN)      2-01                               mg by           [facility]



     1000 MG CR      14:43:                               mouth 2           Medi

yaz



     tablet      02                                 (two)           Center



                                                  times           



                                                  daily.           

 

     metoprolol      2020      Yes       Essential           TAKE 1           

[facility]



     succinate      1-06                hypertensio           TABLET BY         

  [facility]



     XL        00:00:                n              MOUTH           st



     (TOPROL-XL)      00                                 EVERY DAY           



     100 mg 24                                                        



     hr tablet                                                        

 

     niacin      2020- No             2000mg QD   TAKE 2           [place]

n



     (NIASPAN)      0-19 11-10                          TABLETS           Method

i



     1000 MG CR      00:00: 00:00                          (2,000 MG           s

t



     tablet      00   :00                           TOTAL) BY           



                                                  MOUTH           



                                                  NIGHTLY.           

 

     metoprolol      2020 No        Essential 100mg QD   Take 1         

  [facility]



     succinate      0-19 11-06           hypertensio           tablet           

[facility]



     XL        00:00: 00:00           n              (100 mg           st



     (TOPROL-XL)      00   :00                           total) by           



     100 mg 24                                         mouth           



     hr tablet                                         daily.           

 

     tamsulosin      2020      Yes            .4mg QD   Take 0.4           [facility]

[facility]



     (FLOMAX)      0-07                               mg by           [facility]



     0.4 mg      22:07:                               mouth           st



     capsule,ext      01                                 daily.           



     ended                                                        



     release                                                        



     24hr                                                        

 

     FOLIC      2020      Yes            1{tbl} QD   Take 1           [facility]



     ACID/MULTIV      0-07                               tablet by           Met

[facility]



     IT-MIN/LUTE      22:07:                               mouth           st



     IN (CENTRUM      01                                 daily.           



     SILVER                                                        



     ORAL)                                                        

 

     TRAVOPROST      2020      Yes            1[drp] QD   Administer          

 [facility]



     (TRAVATAN Z      0-07                               1 drop to           Met

[facility]



     OPHT)      22:07:                               both eyes           st



               01                                 nightly.           



                                                  Both eyes           

 

     aspirin      2020- No             81mg QD   Take 81 mg           [facility]

[facility]



     (ECOTRIN)      0-07 10-07                          by mouth           [facility]

di



     81 MG      14:14: 00:00                          every           st



     enteric      49   :00                           evening.           



     coated                                                        



     tablet                                                        

 

     aspirin      2020 No             81mg QD   Take 1           [facility]



     (ECOTRIN)      0-07 10-02                          tablet (81           Met

h[facility]



     81 MG      00:00: 23:59                          mg total)           st



     enteric      00   :00                           by mouth           



     coated                                         every           



     tablet                                         evening           



                                                  for 360           



                                                  days.           

 

     atorvastati      2020- No        Chronic 40mg QD   Take 1           

[place]



     n (LIPITOR)      0-07 10-02           systolic           tablet (40        

   [facility]



     40 mg      00:00: 23:59           congestive           mg total)           

st



     tablet      00   :00            heart           by mouth           



                                   failure           daily for           



                                   (HCC)           360 days.           

 

     clopidogreL      2020 No        Chronic 75mg QD   Take 1           

[place]



     (PLAVIX) 75      0-07 10-02           systolic           tablet (75        

   [facility]



     mg tablet      00:00: 23:59           congestive           mg total)       

    st



               00   :00            heart           by mouth           



                                   failure           daily for           



                                   (HCC)           360 days.           

 

     niacin      2020- No             2000mg QD   Take 2           [facility]

n



     (NIASPAN)      0-05 10-                          tablets           Method

i



     1000 MG CR      00:00: 00:00                          (2,000 mg           s

t



     tablet      00   :00                           total) by           



                                                  mouth           



                                                  nightly.           

 

     clopidogreL      2020- No        Chronic           TAKE 1           

[place]



     (PLAVIX) 75      0-05 10-07           systolic           TABLET BY         

  [facility]



     mg tablet      00:00: 00:00           congestive           MOUTH           

st



               00   :00            heart           EVERY DAY           



                                   failure                          



                                   (HCC)                          

 

     LACTULOSE      2020- No             30mL QD   Take 30 mL           [facility]

[place]



     ORAL                                by mouth           [facility]



               15:33: 00:00                          daily.           st



               20   :00                                          

 

     metoprolol      2020- No        Essential           TAKE 1          

 [place]



     succinate      7-27 10-           hypertensio           TABLET BY        

   [facility]



     XL        00:00: 00:00           n              MOUTH           st



     (TOPROL-XL)      00   :00                           EVERY DAY           



     100 mg 24                                                        



     hr tablet                                                        

 

     clopidogreL      2020- No        Chronic           TAKE 1           

[place]



     (PLAVIX) 75      7 10-05           systolic           TABLET BY         

  [facility]



     mg tablet      00:00: 00:00           congestive           MOUTH           

st



               00   :00            heart           EVERY DAY           



                                   failure                          



                                   ([facility] within [facility])                          

 

     niacin      -0 2020- No                       TAKE 1           [place]



     (NIASPAN)      7-                          TABLET BY           [facility]

[facility]



     1000 MG CR      00:00: 00:00                          MOUTH           st



     tablet      00   :00                           TWICE A           



                                                  DAY            

 

     metoprolol      - 2020- No        Essential           TAKE 1          

 [place]



     succinate      5 07-27           hypertensio           TABLET BY        

   [facility]



     XL        00:00: 00:00           n              MOUTH           st



     (TOPROL-XL)      00   :00                           EVERY DAY           



     100 mg 24                                                        



     hr tablet                                                        

 

     niacin      -2020- No                       TAKE 1           [place]



     (NIASPAN)      -                          TABLET BY           [facility]

[facility]



     1000 MG CR      00:00: 00:00                          MOUTH           st



     tablet      00   :00                           TWICE A           



                                                  DAY            

 

     clopidogreL      2020- No        Chronic 75mg QD   Take 1           

[place]



     (PLAVIX) 75      -14           systolic           tablet (75        

   [facility]



     mg tablet      00:00: 00:00           congestive           mg total)       

    st



               00   :00            heart           by mouth           



                                   failure           daily.           



                                   ([facility])                          

 

     atorvastati      2020- No        Chronic 40mg QD   Take 1           

[place]



     n (LIPITOR)      4-08 10-07           systolic           tablet (40        

   [facility]



     40 MG      00:00: 00:00           congestive           mg total)           

st



     tablet      00   :00            heart           by mouth           



                                   failure           daily.           



                                   ([facility])                          

 

     tamsulosin      -0      Yes            .4mg QD   Take 0.4           [facility]



     (FLOMAX)      2-28                               mg by           [facility]



     0.4 mg Cap      14:38:                               mouth           [facility]

l



     24 hr      09                                 daily.           Center



     capsule                                                        

 

     multivitami      2020-0      Yes            1{capsu QD   Take 1           C

[facility]



     n capsule      2-28                     le}       capsule by           [facility]

s -



               14:38:                               mouth           Medical



               09                                 daily.           Center

 

     lactulose      2020-0      Yes            20g  QD   Take 20 g           [facility]



     (CHRONULAC)      2-28                               by mouth           [facility]

s -



     10 gram/15      14:38:                               daily.           Medic

al



     mL (15 mL)      09                                                Center



     solution                                                        

 

     pantoprazol      2020-0      Yes            40mg QD   Take 40 mg           

[facility]



     e         2-28                               by mouth           [facility]



     (PROTONIX)      14:38:                               daily.           Medic

al



     40 MG      09                                                Center



     tablet                                                        

 

     atorvastati      2020-0      Yes            40mg QD   Take 40 mg           

[facility]



     n (LIPITOR)      2-28                               by mouth           [facility]

s -



     40 MG      14:38:                               daily.           Medical



     tablet      09                                                Center

 

     metoprolol      -0      Yes            100mg QD   Take 100           [facility]

I [facility]



     (TOPROL-XL)      2-28                               mg by           [facility]



     100 MG 24      14:38:                               mouth           Medical



     hr tablet      09                                 daily.           [place]

 

     travoprost      -0      Yes            1[drp] QD   Place 1           [facility]

[facility]



     (TRAVATAN      2-                               drop into           Lu

 -



     Z) 0.004 %      14:38:                               both eyes           Me

dical



     Drop      09                                 nightly.           Center



     ophthalmic                                                        



     drops                                                        

 

     phenylephri      -0      Yes            1{spray      1 spray by        

    St



     ne                             }         Each Nare           [facility]



     (UZAIR-SYNEPH      14:38:                               route           Medic

al



     RINE) 1 %      09                                 every 6           Center



     Spry nasal                                         (six)           



     spray                                         hours as           



                                                  needed.           

 

     senna-docus      2020-0 2020- No             1{tbl} QD   Take 1           C

[facility]



     ate       13                          tablet by           [email or web address] -



     (SENOKOT S)      00:00: 23:59                          mouth           Medi

[facility]



     8.6-50 mg      00   :00                           nightly           Center



     per tablet                                         for 14           



                                                  days.           

 

     acetaminoph      2020-0 2020- No             1{tbl}      Take 1           C

[facility]



     en-codeine      09                          tablet by           [facility]

es -



     (TYLENOL-CO      00:00: 23:59                          mouth           Medi

yaz



     DEINE #3)      00   :00                           every 4           Center



     300-30 mg                                         (four)           



     per tablet                                         hours as           



                                                  needed for           



                                                  up to 10           



                                                  days. Max           



                                                  Daily           



                                                  Amount: 6           



                                                  tablets           

 

     levoFLOXaci      2020-0 2020- No             500mg QD   Take 1           [facility]

[facility]



     n                                   tablet           [facility]



     (LEVAQUIN)      00:00: 23:59                          (500 mg           Med

ical



     500 MG      00   :00                           total) by           Center



     tablet                                         mouth           



                                                  daily for           



                                                  3 days           



                                                  Start day           



                                                  before           



                                                  urology           



                                                  clinic.           

 

     levoFLOXaci      2020-0 2020- No             500mg QD   Take 1           [facility]

[facility]



     n                                   tablet           [facility]



     (LEVAQUIN)      00:00: 00:00                          (500 mg           Med

ical



     500 MG      00   :00                           total) by           Center



     tablet                                         mouth           



                                                  daily for           



                                                  3 days           



                                                  Start day           



                                                  before           



                                                  urology           



                                                  clinic.           

 

     senna-docus      2020-0 2020- No             1{tbl} QD   Take 1           C

[facility]



     ate                                 tablet by           [email or web address] -



     (SENOKOT S)      00:00: 00:00                          mouth           Medi

yaz



     8.6-50 mg      00   :00                           nightly           Center



     per tablet                                         for 14           



                                                  days.           

 

     acetaminoph      2020- No             1{tbl}      Take 1           C

[facility]



     en-codeine                                tablet by           Kaylynn oneal -



     (TYLENOL-CO      00:00: 00:00                          mouth           Medi

yaz



     DEINE #3)      00   :00                           every 4           Center



     300-30 mg                                         (four)           



     per tablet                                         hours as           



                                                  needed for           



                                                  up to 10           



                                                  days. Max           



                                                  Daily           



                                                  Amount: 6           



                                                  tablets           

 

     metoprolol      2020- No        Essential 100mg QD   Take 1         

  [facility]



     succinate      2-10 05-04           hypertensio           tablet           

[facility]



     XL        00:00: 00:00           n              (100 mg           st



     (TOPROL-XL)      00   :00                           total) by           



     100 mg 24                                         mouth           



     hr tablet                                         daily.           

 

     niacin      2020- No                       TAKE 1           J[facility]



     (NIASPAN)       04-21                          TABLET BY           [facility]

judit



     1000 MG CR      00:00: 00:00                          MOUTH           st



     tablet      00   :00                           TWICE A           



                                                  DAY            

 

     clopidogrel      2019- No        Chronic           TAKE 1           

Jorge



     (PLAVIX) 75      1-07 04-20           systolic           TABLET BY         

  [facility]



     mg tablet      00:00: 00:00           congestive           MOUTH           

st



               00   :00            heart           EVERY DAY           



                                   failure                          



                                   (HCC)                          

 

     atorvastati      2020- No        Chronic           TAKE 1           

J[facility]



     n (LIPITOR)      4- 04-08           systolic           TABLET BY         

  [facility]



     40 MG      00:00: 00:00           congestive           MOUTH           st



     tablet      00   :00            heart           EVERY DAY           



                                   failure                          



                                   (HCC)                          

 

     niacin      2018 2020- No             1000mg Q.5D Take 1           Denisa

n



     (NIASPAN)       10-05                          tablet           [facility]



     1000 MG CR      00:00: 00:00                          (1,000 mg           s

t



     tablet      00   :00                           total) by           



                                                  mouth 2           



                                                  (two)           



                                                  times a           



                                                  day.           

 

     lactulose      2017      Yes            30g  QD   Take 30 g           Carla

ston



     (CHRONULAC)                                     by mouth           Meth

judit



     10 gram/15      00:00:                               nightly.           st



     mL solution      00                                                

 

     pantoprazol      2017      Yes            40mg QD   Take 40 mg           

Pat[facility]



     e         0-26                               by mouth           [facility]



     (PROTONIX)      00:00:                               daily.           st



     40 MG EC      00                                                



     tablet                                                        







Vital Signs







             Vital Name   Observation Time Observation Value Comments     Source

 

             Body height  2021 14:50:00 172.7 cm                  [facility]

 

             Body weight  2021 14:50:00 70.308 kg                 [facility]

 

             BMI          2021 14:50:00 23.57 kg/m2               [facility]

 

             Systolic blood 2020-11-10 11:14:00 148 mm[Hg]                [facility]

[facility]



             pressure                                            

 

             Diastolic blood 2020-11-10 11:14:00 70 mm[Hg]                 [facility]

on [facility]



             pressure                                            

 

             Heart rate   2020-11-10 11:14:00 75 /min                   [facility] 

[facility]

 

             Body height  2020-11-10 11:14:00 172.7 cm                  [facility] 

[facility]

 

             Body weight  2020-11-10 11:14:00 73.483 kg                 [place] 

[facility]

 

             BMI          2020-11-10 11:14:00 24.63 kg/m2               [place] 

[facility]

 

             Respiratory rate 2020-10-07 19:00:00 25 /min                   [facility]

[facility]

 

             Oxygen saturation in 2020-10-07 19:00:00 98 /min                   

[place] [facility]



             Arterial blood by                                        



             Pulse oximetry                                        

 

             Body temperature 2020-10-07 14:20:00 36.44 Shirley                 [facility]

[facility]

 

             Systolic blood 2020 12:00:00 110 mm[Hg]                [facility]

 

             Diastolic blood 2020 12:00:00 53 mm[Hg]                 [facility]

 

             Heart rate   2020 12:00:00 71 /min                   [facility]

 

             Body temperature 2020 12:00:00 36.39 Shirley                 [facility]

 

             Respiratory rate 2020 12:00:00 18 /min                   [facility]

 

             Oxygen saturation in 2020 12:00:00 97 /min                   

[facility]



             Arterial blood by                                        Medical Ce

nter



             Pulse oximetry                                        







Procedures







                Procedure       Date / Time     Performing Clinician Source



                                Performed                       

 

                ECG PRE/POST OP 2020-10-07 15:17:34 Kelton Randhawa [facility]

[facility]ist

 

                CV LEFT HEART CATH LV 2020-10-07 13:50:22 Kelton Randhawa [facility]



                GRAM WITH CORS                                  

 

                ACTIVATED CLOTTING TIME 2020-10-07 13:43:00 Kelton Randhawa [facility]

 

                ACTIVATED CLOTTING TIME 2020-10-07 13:41:00 Kelton Randhawa [facility]

 

                ESTIMATED GFR   2020-10-07 11:26:00 Kelton Randhawa

odist

 

                POC PANEL       2020-10-07 11:26:00 Kelton Randhawa

odzuleyka

 

                HC COMPLETE BLD COUNT 2020-10-07 11:20:00 Kelton Randhawa

n [facility]



                W/AUTO DIFF                                     

 

                ECG 12-LEAD     2020-10-07 10:41:54 Kelton Randhawa

[facility]

 

                CV STRESS TEST NUCLEAR 2020 14:10:12 Kelton Randhawa

on [facility]



                CARDIO                                          

 

                NM MYOCARDIAL PERFUSION 2020 14:10:12 Kelton Randhawa [facility]



                REST STRESS 1 DAY                                 

 

                TTE COMPLETE, W CONTRAST, 2020 14:13:42 Kelton Randhawaton [facility]



                W DOPPLER ()                                 

 

                LIPID PANEL     2020 10:00:00 Kelton Randhawa

 

                AST (SGOT)      2020 10:00:00 Kelton Randhawa

od[facility]ka

 

                COPY(IES) SENT TO: 2020 10:00:00 Kelton Randhawa

[facility]ist

 

                CHOLESTEROL     2020 10:00:00 Kelton Randhawa

 

                HDL CHOLESTEROL 2020 10:00:00 Kelton Randhawa

 

                TRIGLYCERIDES   2020 10:00:00 Kelton Randhawa

 

                LDL-CHOLESTEROL (REFLEX 2020 10:00:00 Kelton Randhawa [facility]



                [facility])                                          

 

                COPY RECEIVED FROM: 2020 10:00:00 Kelton Randhawa

 

                RHYTHM STRIP - SCAN 2020 16:02:09 Antony Leyva [facility]

 

                BASIC METABOLIC PANEL (7) 2020 06:32:00 Erlinda Lewis [facility]

 

                CBC (HEMOGRAM ONLY) 2020 04:33:00 Ted Lewis [facility]

[facility]

 

                BASIC METABOLIC PANEL (7) 2020 04:30:00 Erlinda Lewis [facility]

 

                CBC (HEMOGRAM ONLY) 2020 04:30:00 Ted Lewis [facility]

[facility]

 

                TRANSFUSION SERVICE 2020 18:02:01 Provider, Default [facility]



                REPORT - SCAN                   Scanning        [facility]

 

                INTRAOPERATIVE PATH 2020 14:21:54 Provider, Default [facility]



                REPORT - SCAN                   Scanning        [facility]

 

                BASIC METABOLIC PANEL (7) 2020 04:15:00 Erlinda Lewis [facility]

 

                CBC (HEMOGRAM ONLY) 2020 04:15:00 Ted Lewis [facility]

[facility]

 

                BASIC METABOLIC PANEL (7) 2020 13:52:00 Erlinda Lewis [facility]

 

                CBC (HEMOGRAM ONLY) 2020 13:52:00 RegaladoTed Le [facility]

[facility]

 

                TISSUE EXAM     2020 10:20:00 [facility]

 

                TN AN EPIDURAL CATH - NO 2020 09:05:25 Lakhwinder [facility]

 

                CYSTECTOMY      2020 08:19:00 [facility]

 

                DIVERTICULECTOMY,BLADDER 2020 08:19:00 [facility]

 

                ABORH, MANUAL   2020 06:11:00 Reyes, Meredith Anne [facility]

 

                POCT-HEMOGLOBIN METER 2020 06:03:00 [facility]

 

                BASIC METABOLIC PANEL (7) 2020 05:58:00 Erasmo Hernandez 

[facility]

 

                TYPE AND SCREEN, 2020 05:54:00 Ted Lewis [facility]







Plan of Care







             Planned Activity Planned Date Details      Comments     Source

 

             Future Scheduled 2021   DEPRESSION SCREENING              [facility] -



             Test         00:00:00     (12+) [code =              Medical Center



                                       DEPRESSION SCREENING              



                                       (12+)]                    

 

             Future Scheduled 2008   MEDICARE ANNUAL              [facility] -



             Test         00:00:00     WELLNESS (YEAR 2 or              Medical 

Center



                                       FIRST YEAR if no IPPE)              



                                       [code = MEDICARE              



                                       ANNUAL WELLNESS (YEAR              



                                       2 or FIRST YEAR if no              



                                       IPPE)]                    

 

             Future Scheduled 2007   65+ PNEUMOCOCCAL              [facility]

 [facility]



             Test         00:00:00     VACCINE (1 of 1 -              



                                       PPSV23) [code = 65+              



                                       PNEUMOCOCCAL VACCINE              



                                       (1 of 1 - PPSV23)]              

 

             Future Scheduled 1992   SHINGLES VACCINES (1              [facility]



             Test         00:00:00     of 2) [code = SHINGLES              Medic

al Center



                                       VACCINES (1 of 2)]              

 

             Future Scheduled 1992   SHINGLES VACCINES (#1)              [facility]

[place] [facility]



             Test         00:00:00     [code = SHINGLES              



                                       VACCINES (#1)]              

 

             Future Scheduled 1961   DTAP/TDAP/TD VACCINES              [facility]

[facility] -



             Test         00:00:00     (1 - Tdap) [code =              Medical C

enter



                                       DTAP/TDAP/TD VACCINES              



                                       (1 - Tdap)]               

 

             Future Scheduled 1960   HEPATITIS C SCREENING              [facility]

[facility] -



             Test         00:00:00     [code = HEPATITIS C              Medical 

Center



                                       SCREENING]                

 

             Future Scheduled 1960   Hepatitis C screening              [facility]

[place] [facility]



             Test         00:00:00     (procedure) [code =              



                                       775065960]                

 

             Future Scheduled 1958   COVID-19 VACCINE (1 of              [facility]

[place] [facility]



             Test         00:00:00     2) [code = COVID-19              



                                       VACCINE (1 of 2)]              







Encounters







        Start   End     Encounter Admission Attending Care    Care    Encounter 

Source



        Date/Time Date/Time Type    Type    Clinicians Facility Department ID   

   

 

        2021 Office          Sandoval Valdez [facility]   [id number]

[id number] 



        15:02:50 15:41:07 Visit           Jim Melgoza  [id number]         



                                                        [id number]         



                                                        [phone]         



                                                        530             

 

        2021 Office          YO Bazzi     [id number]

07 



        08:20:10 08:35:10 Visit           Marcus AMBULATOR [id number]       

  



                                                Y       [id number]         



                                                        [id number]         



                                                        300             

 

        2020-11-10 2020-11-10 Outpatient         JOSE   [facility]     [id number]

[address] [place]



        00:00:00 00:00:00                 KELTON                  115     Method

i



                                                                        st

 

        2020-10-07 [facility]07 [facility]     [id number]

[address] [place]



        00:00:00 00:00:00                 KELTON                  847     Method

i



                                                                        st

 

        2020 Outpatient         JOSE   [facility]     [id number]

[address] [place]



        00:00:00 00:00:00                 KELTON                  805     Method

i



                                                                        st

 

        2020 Outpatient         JOSE   [facility]     [id number]

[address] [place]



        00:00:00 00:00:00                 KELTON                  584     Method

i



                                                                        st

 

        [facility]     [id number]

[address] [place]



        00:00:00 00:00:00                 KELTON                  855     Method

i



                                                                        st







Results







           Test Description Test Time  Test Comments Results    Result Comments 

Source









                    ECG Pre/Post Op     2020-10-08 18:47:41 









                      Test Item  Value      Reference Range Interpretation Comme

nts









             Ventricular rate (test code = 253) 62                              

       

 

             Atrial rate (test code = 255) 62                                   

  

 

             TN interval (test code = 266) 166                                  

  

 

             QRSD interval (test code = 260) 88                                 

    

 

             QT interval (test code = 264) 402                                  

  

 

             QTC interval (test code = 265) 408                                 

   

 

             P axis 1 (test code = 267) 69                                     

 

             QRS axis 1 (test code = 268) 43                                    

 

 

             T wave axis (test code = 270) 95                                   

  

 

             EKG impression (test code = 273) Normal sinus rhythm-Septal infarct

 (cited on                           



                          or before 10-FEB-2017)-T wave abnormality,            

               



                          consider anterior ischemia-Abnormal ECG-In            

               



                          automated comparison with ECG of 07-OCT-2020          

                 



                          10:41,-premature atrial complexes are no              

             



                          longer present-Electronically Signed By               

            



                          Aidee SNIDER, Donita ([id number]) on 10/8/2020                

           



                          6:47:40 PM                             



[facility] lab procedure2020-10-08 18:04:41PROCEDURE DETAILS 
Attending: Dr. Kelton Randhawa MD  Interventional Fellow: Laina Tubbs  After 
obtaining informed consent, the patient was brought to the cardiac 
catheterization suite. The right radial artery was located, skin was infiltrated
was lidocaine. The artery was accessed using the Seldingertechnique, and a 6F 
glidesheath was inserted. NTG and verapamil were administered intra-arterially 
via sheath. Heparin was administered intravenously.  A JL3.5 and JR 4 catheters 
were sequentially was advanced over a "baby-J" wire and selective coronary 
angiography was performed, standard views were obtained.   FINDINGS:LM: &lt;30% 
stenosis LAD: &lt;30% ISR of mid LAD stent, mild diffuse diseaseLCx: non-
dominant,  mild diffuse diseaseRCA: dominant,  70% ostial stenosis Based on the 
diagnostic angiogram findings decision was made to proceed with intervention on 
the RCA The RCA was engaged with the FR4 with SH Guide Catheter and a Ke Blue 
coronary wire was advanced into the distal PDA. The lesion in the ostial and 
proximal RCA was pre-dilated with a 3.5x12 mm semi-compliant balloon and stented
with a 4.0x18mm Resolute Pearce FELY. The proximal end of the stent is a couple 
struts into the aorta. Post-dilation with 4.5x12 mm NC balloon to flare the 
ostium. Final angiography revealed no evidence of dissection or perforation; 
there was YISSEL 3 flow and 0% residual stenosis. HEMOSTASIS: TR Band inflated to 
15 cc of air EQUIPMENT/ANTICOAGULATION: Right Radial [id number] Sheath FR4  Guide 
Catheter Ke Blue coronary wire  Anticoagulation:  Angiomax bolus and Infusion 
with ACT &gt;250 sec prior to procedure  CONCLUSION:- 4.0x18mm Resolute Pearce FELY
to ostial RCA PLAN: 1. ASA 81mg daily2. Clopidogrel 75mg daily 3. Continue 
statin4. Cardiac medical therapy and aggressive risk factor modification. 
Cardiac rehabilitation. 5. Transferred in stable condition6. TR band per 
protocol7. ICD interrogation today then discharge home ANESTHESIAUnder my 
supervision, 4 mg of versed and 50 mcg of fentanyl were administered 
intravenously for moderate sedation. Pulse oxymetry, heart rate and blood 
pressure were continuously measured by an independent trained observer present. 
I spent 60 minutes of face to face attendance with the patient during sedation.
[facility] MethodistActivated clotting time2020-10-07 15:40:35





             Test Item    Value        Reference Range Interpretation Comments

 

             Activated clotting time 750          See_Comment  H            Oper

ator Name: Obed



             (test code = 5298)                                        [facility]

ice ID:



                                                                 [id number]  [Auto

mated



                                                                 message] The Kno

stem



                                                                 which generated

 this



                                                                 result transmit

rosalba



                                                                 reference range

: 96 -



                                                                 152 sec. The



                                                                 reference range

 was



                                                                 not used to int

erpret



                                                                 this result as



                                                                 normal/abnormal

.

 

             Lab Interpretation (test Abnormal                               



             code = 18293-8)                                        



[facility] with platelet and differential2020-10-07 11:42:54





             Test Item    Value        Reference Range Interpretation Comments

 

             WBC (test code = 6.36         See_Comment                [Automated

 message]



             31975-5)                                            The system [employer]



                                                                 generated this



                                                                 result transmit

rosalba



                                                                 reference range

:



                                                                 4.50 - 11.00 k/

uL.



                                                                 The reference r

van



                                                                 was not used to



                                                                 interpret this



                                                                 result as



                                                                 normal/abnormal

.

 

             RBC (test code = 4.12 m/uL    4.4-6        L            



             85858-0)                                            

 

             HGB (test code = 718-7) 14.1 g/dL    14-18                     

 

             HCT (test code = 4544-3) 40.1 %       41-51        L            

 

             MCV (test code = 787-2) 97.3 fL                          

 

             MCH (test code = 785-6) 34.2 pg      27-34        H            

 

             MCHC (test code = 786-4) 35.2 g/dL    31-37                     

 

             RDW - SD (test code = 49.0 fL      37-55                     



             53725-3)                                            

 

             MPV (test code = 10.4 fL      8.8-13.2                  



             76010-5)                                            

 

             Platelet count (test 165          See_Comment                [Autom

ated message]



             code = 52068-9)                                        The system [employer]

[facility]



                                                                 generated this



                                                                 result transmit

rosalba



                                                                 reference range

: 150



                                                                 - 400 k/uL. The



                                                                 reference range

 was



                                                                 not used to



                                                                 interpret this



                                                                 result as



                                                                 normal/abnormal

.

 

             Nucleated RBC (test code 0.00         See_Comment                [A

utomated message]



             = 06569-3)                                          The system [employer]



                                                                 generated this



                                                                 result transmit

rosalba



                                                                 reference range

:



                                                                 /100 WBC. The



                                                                 reference range

 was



                                                                 not used to



                                                                 interpret this



                                                                 result as



                                                                 normal/abnormal

.

 

             Neutrophils (test code = 71.7 %       39-69        H            



             62532-0)                                            

 

             Lymphocytes (test code = 18.6 %       25-45        L            



             70403-5)                                            

 

             Monocytes (test code = 7.4 %        0-10                      



             26485-3)                                            

 

             Eosinophils (test code = 2.0 %        0-5                       



             54349-7)                                            

 

             Basophils (test code = 0.0 %        0-1                       



             71678-4)                                            

 

             Immature granulocytes 0.3 %        0-1                       "Immat

ure



             (test code = 58231-2)                                        granul

ocytes"



                                                                 (promyelocytes,



                                                                 myelocytes,



                                                                 metamyelocytes)

 

             Lab Interpretation (test Abnormal                               



             code = 33694-7)                                        



[facility] 12 lead2020-10-07 11:32:02





             Test Item    Value        Reference Range Interpretation Comments

 

             Ventricular rate (test 67                                     



             code = 253)                                         

 

             Atrial rate (test code 67                                     



             = 255)                                              

 

             TN interval (test code 172                                    



             = 266)                                              

 

             QRSD interval (test 112                                    



             code = 260)                                         

 

             QT interval (test code 392                                    



             = 264)                                              

 

             QTC interval (test code 414                                    



             = 265)                                              

 

             P axis 1 (test code = 72                                     



             267)                                                

 

             QRS axis 1 (test code = 15                                     



             268)                                                

 

             T wave axis (test code 94                                     



             = 270)                                              

 

             EKG impression (test Sinus rhythm with                           



             code = 273)  premature atrial                           



                          complexes-Anteroseptal                           



                          infarct (cited on or                           



                          before 10-FEB-2017)-ST                           



                          & T wave abnormality,                           



                          consider lateral                           



                          ischemia-Abnormal                           



                          ECG-In automated                           



                          comparison with ECG of                           



                          2019                            



                          08:11,-Questionable                           



                          change in initial                           



                          forces of Lateral                           



                          leads-T wave inversion                           



                          more evident in                           



                          Anterior                               



                          leads-Electronically                           



                          Signed By Catherine Felix MD ([id number]) on                           



                          10/7/2020 11:31:57 AM                           



[facility]2020-10-07 11:27:59





             Test Item    Value        Reference Range Interpretation Comments

 

             POC sodium (test code 140 mmol/L   135-148                   



             = 2947-0)                                           

 

             POC potassium (test 3.8 mmol/L   3.5-5                     



             code = 6298-4)                                        

 

             POC chloride (test 103 mmol/L                       



             code = 2069-3)                                        

 

             POC CO2 (test code = 24 mmol/L    24-31                     



             48603-9)                                            

 

             POC glucose (test 99 mg/dL     65-99                     



             code = 2339-0)                                        

 

             POC BUN (test code = 12 mg/dL     8-24                      



             6299-2)                                             

 

             POC creatinine (test 0.8 mg/dl    0.7-1.2                   



             code = 74369-7)                                        

 

             POC hematocrit (test 46 %         41-51                     



             code = 4544-3)                                        

 

             POC anion gap (test 18 mmol/L    8-20                      

 Name: Hoa



             code = 5473333)                                        Kanika Méndez

ice ID:



                                                                 [id number]



[place] [facility]Estimated GFR2020-10-07 11:27:59





             Test Item    Value        Reference Range Interpretation Comments

 

             Estimated GFR (test 85           mL/min/1.73 m2              Caterg

ory  Units



             code = 21295-7)                                        Interpretati

onG1



                                                                 >=90     Normal

 or highG2



                                                                       60-89    

Mildly



                                                                 [id number]   

     45-59



                                                                  Mildly to mode

rately



                                                                 awshijliiE6b   

     30-44



                                                                  Moderately to 

severely



                                                                 decreasedG4    

     15-29



                                                                  Severely decre

asedG5



                                                                   <15      Kidn

ey



                                                                 failureThe eGFR

 was



                                                                 calculated kierra melendez the



                                                                 Chronic Kidney 

Disease



                                                                 Epidemiology Co

llaboration



                                                                 (CKD-EPI) equat

ion.



                                                                 Interpretation 

is based on



                                                                 recommendations

 of the



                                                                 National Kidney



                                                                 Foundation-Kidn

ey Disease



                                                                 Outcomes Qualit

y Initiative



                                                                 (NKF-KDOQI) pub

lished in



                                                                 .



[place] MethodistLipid [id number] 01:15:00





             Test Item    Value        Reference Range Interpretation Comments

 

             Cholesterol, total 130 mg/dL    <200                      



             (test code = 2093-3)                                        

 

             HDL cholesterol 54 mg/dL     See_Comment                [Automated



             (test code = 5-9)                                        message

] The



                                                                 system which



                                                                 generated this



                                                                 result



                                                                 transmitted



                                                                 reference range

:



                                                                 > OR = 40. The



                                                                 reference range



                                                                 was not used to



                                                                 interpret this



                                                                 result as



                                                                 normal/abnormal

.

 

             Triglycerides (test 67 mg/dL     <150                      



             code = 2571-8)                                        

 

             LDL cholesterol 62           mg/dL (calc)              Reference ra

nge:



             calculated (test                                        <100 Desira

ble



             code = 45758-6)                                        range <100 m

g/dL



                                                                 for primary



                                                                 prevention;  <7

0



                                                                 mg/dL for



                                                                 patients with C

HD



                                                                 or diabetic



                                                                 patients with >



                                                                 or = 2 CHD risk



                                                                 factors. LDL-C 

is



                                                                 now calculated



                                                                 using the



                                                                 [place]-Persaud



                                                                 calculation,



                                                                 which is a



                                                                 validated novel



                                                                 method providin

g



                                                                 better accuracy



                                                                 than the



                                                                 Friedewald



                                                                 equation in the



                                                                 estimation of



                                                                 LDL-C. [place] S

S



                                                                 et al. DELMY.



                                                                 2013;310(19):



                                                                 [id number]



                                                                 (http://educati

on



                                                                 .[employer]osti

[employer]



                                                                 .com/faq/ZEC720

)

 

             Cholesterol/HDL 2.4          See_Comment                [Automated



             ratio (test code =                                        message] 

The



             9830-1)                                             system which



                                                                 generated this



                                                                 result



                                                                 transmitted



                                                                 reference range

:



                                                                 <5.0 (calc). Th

e



                                                                 reference range



                                                                 was not used to



                                                                 interpret this



                                                                 result as



                                                                 normal/abnormal

.

 

             Non-HDL cholesterol 76           See_Comment               For arianna

ents with



             (test code =                                        diabetes plus 1



             56506-1)                                            major ASCVD ris

k



                                                                 factor, treatin

g



                                                                 to a non-HDL-C



                                                                 goal of <100



                                                                 mg/dL (LDL-C of



                                                                 <70 mg/dL) is



                                                                 considered a



                                                                 therapeutic



                                                                 option.



                                                                 [Automated



                                                                 message] The



                                                                 system which



                                                                 generated this



                                                                 result



                                                                 transmitted



                                                                 reference range

:



                                                                 <130 mg/dL



                                                                 (calc). The



                                                                 reference range



                                                                 was not used to



                                                                 interpret this



                                                                 result as



                                                                 normal/abnormal

.

 

             PHILIPP (test code = FASTING:YESFASTING                           



             PHILIPP)         : YES                                  

 

             RAC (test code = Performing                             



             RAC)         Organization                           



                          Information:                           



                          Site ID: [facility]                           



                          Name: [employer]                            



                          [facility]    Address:                           



                          [address] [place] [address]                             



                          Director: Derek Peace                           



[place] MethodistAST (SGOT)2020 01:15:00





             Test Item    Value        Reference Range Interpretation Comments

 

             AST (test code = 20 U/L       10-35                     



             0-8)                                             

 

             PHILIPP (test code = FASTING:YESFASTING: YES                           



             PHILIPP)                                                

 

             RAC (test code = Performing Organization                           



             RAC)         Information:    Site ID:                           



                          [facility]    Name: [facility]                           



                          Address: [address] [place] [address]    Director:                           



                          Derek Peace                           



[facility] MethodistCOPY RECEIVED FROM:2020 01:15:00Copy received 
from:Comment:              YUN CARDIO           [address] 
[place] [address] QUESTFASTING:YESFASTING: YESHouston Methodist[facility]
2020 01:13:00





             Test Item    Value        Reference Range Interpretation Comments

 

             Cholesterol, total 130 mg/dL    <200                      



             (test code =                                        



             2093-3)                                             

 

             PHILIPP (test code = FASTING:YESFASTING: YES                           



             PHILIPP)                                                

 

             RAC (test code = Performing Organization                           



             RAC)         Information:    Site ID:                           



                          [facility]    Name: [facility]                           



                          Address: [address] [place] [address]                           



                          [id number]    Director:                           



                          Derek Peace                           



[place] MethodistHDL [id number] 01:13:00





             Test Item    Value        Reference Range Interpretation Comments

 

             HDL cholesterol 54 mg/dL     See_Comment                [Automated



             (test code =                                        message] The



             2085)                                             system which



                                                                 generated this



                                                                 result



                                                                 transmitted



                                                                 reference range

:



                                                                 > OR = 40. The



                                                                 reference range



                                                                 was not used to



                                                                 interpret this



                                                                 result as



                                                                 normal/abnormal

.

 

             PHILIPP (test code = FASTING:YESFASTING:                           



             PHILIPP)         YES                                    

 

             RAC (test code = Performing                             



             RAC)         Organization                           



                          Information:                           



                          Site ID: [facility]                           



                          Name: [facility]                           



                          Lab    Address:                           



                          [address] [place] [address]                            



                          [id number]                             



                          Director: Derek Peace                           



[place] [id number] 01:13:00





             Test Item    Value        Reference Range Interpretation Comments

 

             Triglycerides (test 67 mg/dL     <150                      



             code = 2571-8)                                        

 

             PHILIPP (test code = PHILIPP) FASTING:YESFASTING:                          

 



                          YES                                    

 

             RAC (test code = RAC) Performing                             



                          Organization                           



                          Information:    Site                           



                          ID: [facility]    Name: [facility]                           



                          Lab    Address: [address] [place] [address]                           



                             Director: Derek Peace                           



[facility] MethodistLDL-CHOLESTEROL (REFLEX QUEST)2020 01:13:00





             Test Item    Value        Reference Range Interpretation Comments

 

             LDL cholesterol 62           mg/dL (calc)              Reference ra

nge:



             calculated (test                                        <100 Desira

ble



             code = 27395-1)                                        range <100 m

g/dL



                                                                 for primary



                                                                 prevention;  <7

0



                                                                 mg/dL for



                                                                 patients with C

HD



                                                                 or diabetic



                                                                 patients with >



                                                                 or = 2 CHD risk



                                                                 factors. LDL-C 

is



                                                                 now calculated



                                                                 using the



                                                                 Mushtaq



                                                                 calculation,



                                                                 which is a



                                                                 validated novel



                                                                 method providin

g



                                                                 better accuracy



                                                                 than the



                                                                 Friedewald



                                                                 equation in the



                                                                 estimation of



                                                                 LDL-C. [place] LEONARDO

S



                                                                 et al. DELMY.



                                                                 2013;310(19):



                                                                 [phone]



                                                                 (http://educati

on



                                                                 .SendTaskDiagnosti

[employer]



                                                                 .com/faq/BQR015

)

 

             PHILIPP (test code = FASTING:YESFASTING:                           



             PHILIPP)         YES                                    

 

             RAC (test code = Performing                             



             RAC)         Organization                           



                          Information:                           



                          Site ID: [facility]                           



                          Name: [facility]                           



                          Lab    Address:                           



                          [address] [place] [address]                             



                          Director: Derek Peace                           



[facility] MethodistCHOL/HDLC RATIO (REFLEX QUEST)2020 01:13:00





             Test Item    Value        Reference Range Interpretation Comments

 

             Cholesterol/HD 2.4          See_Comment                [Automated



             L ratio (test                                        message] The s

yste



             code = 9830-1)                                        which generat

ed



                                                                 this result



                                                                 transmitted



                                                                 reference range

:



                                                                 <5.0 (calc). Th

e



                                                                 reference range

 was



                                                                 not used to



                                                                 interpret this



                                                                 result as



                                                                 normal/abnormal

.

 

             PHILIPP (test code FASTING:YESFASTING:                           



             = PHILIPP)       YES                                    

 

             RAC (test code Performing                             



             = RAC)       Organization                           



                          Information:    Site                           



                          ID: [facility]    Name:                           



                          [facility]                           



                          Lab    Address: [address] [place] [address]                             



                          Director: Derek Peace                           



Patel ShintoNON HDL CHOLESTEROL (REFLEX QUEST)2020 01:13:00





             Test Item    Value        Reference Range Interpretation Comments

 

             Non-HDL      76           See_Comment               For patients wi

th



             cholesterol (test                                        diabetes p

jesse 1



             code = 75563-1)                                        major ASCVD 

risk



                                                                 factor, treatin

g



                                                                 to a non-HDL-C



                                                                 goal of <100



                                                                 mg/dL (LDL-C of



                                                                 <70 mg/dL) is



                                                                 considered a



                                                                 therapeutic



                                                                 option.



                                                                 [Automated



                                                                 message] The



                                                                 system which



                                                                 generated this



                                                                 result



                                                                 transmitted



                                                                 reference range

:



                                                                 <130 mg/dL



                                                                 (calc). The



                                                                 reference range



                                                                 was not used to



                                                                 interpret this



                                                                 result as



                                                                 normal/abnormal

.

 

             PHILIPP (test code = FASTING:YESFASTING:                           



             PHILIPP)         YES                                    

 

             RAC (test code = Performing                             



             RAC)         Organization                           



                          Information:                           



                          Site ID: [facility]                           



                          Name: [facility]                           



                          Lab    Address:                           



                          [address] [place] [address]                            



                          [id number]                             



                          Director: Derek Peace                           



[facility] MethodistCOPY(IES) SENT TO:2020 01:13:00Copies/mLComment:         
    JOHNNIE CARDIO [address]          [address] [place] [address] [address] [place] [address] 
[id number] QUESTFASTING:YESFASTING: YESHouston MethodistTissue [id number] 
17:37:00





             Test Item    Value        Reference Range Interpretation Comments

 

             Case Report (test code Surgical Pathology                          

 



             = 104)       Report                                 



                                 Case: [id number]                           



                                                                 



                                 Authorizing                           



                          Provider:  Marcus Bazzi MD                           



                          Collected:                             



                          2020 1020                           



                             Ordering Location:                           



                            [facility] PERIOPERATIVE                           



                               Received:                           



                            2020 1129                           



                                                                 



                              SERVICES                           



                                                                 



                                                                 



                                 Pathologist:                           



                                Ricky Dasilva MD                           



                                                                 



                                                                 



                                       Specimens:                           



                            A) - Fat Pad,                           



                          ANDRÉS-VESICAL FAT                           



                                                                 



                                                                 



                                      B) -                           



                          Bladder Biopsy, left                           



                          bladder diverticulum                           



                                                                 



                                                                 

 

             DIAGNOSIS (test code = [id number]                     

      



             3220)        uZzEwMzNcZnRuYmpcdWMxIH                           



                          vfqxPjREfpu9XsM5UwEkQhJ                           



                          FxhbnNpXGRlZmxhbmcxMDMz                           



                          WZF9pfYyMVXzNEuzMGGzBAb                           



                          xVs3enYNhxCcqWaQsZZHcn9                           



                          mpqcLNptmokFl9t7ayKUWmC                           



                          eS8uXJxUMiwC1uktbNheBOl                           



                          DTFmJIc5sL99MRQdcY2koTP                           



                          zUQobciFxHgZ2OWkkYOOfYo                           



                          W4UTOjiKOmTFJzX1iaOCIpC                           



                          KcfYJJsUKzddXQaKEB7gSbr                           



                          z7Q0dFLgcJPopXohNsBqZdX                           



                          fCBTZe7WuXOu1tZmgH5WfOX                           



                          OmVuX0fHJlYGOqXGydCMScY                           



                          MAxdoB5uO79LQreryS1gUJe                           



                          r5Mjw93bv456uF4giTCpRHR                           



                          9FSPyUNSeoJDqLJSwGRE5HA                           



                          PeyIJpH2y9OvRcuVJbV8B9S                           



                          jJoxEMkG1L3JzNqxGNbW4F3                           



                          EbSslVFtFLFukECpPd4lgRH                           



                          sxYCcdi8fuk47FAE9e2VzyQ                           



                          tlTRP6HDD6FrJpEz5vvZLcX                           



                          AVqYZ6eRpGjxUWkSTQqlv20                           



                          bCwbXKdcjjUnfJ2dBwMsAFI                           



                          zgCOnSFXlMS5qbNVcBESguJ                           



                          5ucmxjXHBnYnJkcmhlYWRcc                           



                          NhtuwAtNq5dfJysBID7BPcc                           



                          M8zoyM6aSbV6MMftG7aerM5                           



                          hKIs0NMtioVX5LWApsQ3mLE                           



                          5voamnr8vcEnBcTT5bueakh                           



                          4scBoHeSL2hjlj7r4ejBpWu                           



                          UN8otyxjy9dfDlJfWLhlSZP                           



                          qihdsYFExt4YcrkayIYTwy8                           



                          XsX2GtzCnsH66zrFaeX15fP                           



                          BDjgKthfL1jyBubmF0zKyNt                           



                          ZnMyNFxxbFxwbGFpblxmMVx                           



                          mczIwXGxhbmcxMDMzXGhpY2                           



                          rmXcVoAQVlbRruVVovp3YjO                           



                          AAsWGMePjMbHX2mUGvJROfk                           



                          Kw5ACIqgBEOMANfYZJWlBOA                           



                          HFL7MEOJAN5JPNAXNFLExXD                           



                          RWN0bXFP4CNsmiFAMtQVOcO                           



                          SBPTkUgQkVOSUdOIExZTVBI                           



                          GY9WSYDeFLEaWLuzeBSbGMP                           



                          hciBCLiBVUklOQVJZIEJMQU                           



                          RERVIsIExFRlQgRElWRVJUS                           



                          UNVTFVNLCBPUEVOIFBBUlRJ                           



                          YEnvY0eGTLURJH1WPDnfKMk                           



                          WCXRCSRWBESXGRU0QGOswdT                           



                          ByOMJtWH7dVOZKWBvRQIbID                           



                          KLBLGLWKQ2KEMHyFVjTQ0jp                           



                          T7FRPRHvTGfNVlIREoBYRJR                           



                          dGKsaEO6JWDSAFnDrLK6URm                           



                          BQRVJJVkVTSUNBTCBBRElQT                           



                          5RPBNUSF5TOFVqbUHO0a9na                           



                          dGYxXHNzdGUxODAwMFxhbnN                           



                          sBXMjXyohmyljYFNcLWN9oi                           



                          UsRSSsITkqZGEvZIydIy0lx                           



                          QQduBkaZtNgPOAjy9tahsAS                           



                          ljymiTk6g2axJMRcZuY5cUT                           



                          tXEciO7kihdLruBIsLOJoNN                           



                          z7gC57YQTaiB3hmLDkVNuum                           



                          fZhHqR0VOmxAILyOsM5IJQo                           



                          vEXsCVCgA1iiYSBcYQhzFTB                           



                          nHKgdkSPwSAY5uPhuu8D1mU                           



                          VzaGVldHtcZjBcZnMyMiBOb                           



                          9CfYCp3uYwtJ4HvAADqZhU1                           



                          bHQgUGFyYWdyYXBoIEZvbnQ                           



                          3uH82DZqbnnL7jVJrc6Ajl4                           



                          5uo011wG8mhWXlHDQ7VQJkP                           



                          COteVLxYEOkGGG4DSRueLGh                           



                          N5efGZFqSG9pgkkxZSkmWAr                           



                          kSZWeiDH0DVFkySXnD0KkGH                           



                          XgCInlOFDwguy1ZaElQx5qo                           



                          KXtaZmtOXaxb6ogf8lofJZu                           



                          Mrb2SMExUkNxPvvfNCdef5F                           



                          on4lgXENpyq1mKWP6oIWeaF                           



                          dsc4O7dATvVCWqvHQkUPClU                           



                          Z1oeQHvUKMsrX1soeegYIUp                           



                          YnJkcmhlYWRccGdicmRyZm9                           



                          ezSrjJUB8DCfmL2lpbH3eUj                           



                          G1YFxbY2vmuJ0cZJt9NYhlH                           



                          NUrwBJ6kqL2FVAaaMZkS0Vr                           



                          rT4kEMVxEP7gler0k2yrEJS                           



                          7GXeiOCJhCpU6zyL9DYHfyZ                           



                          WoDCQuzWnnLBtxv773LIF7F                           



                          aFuTZFbz9MzH3BmjGobK30w                           



                          aFruX10vIUTqsDqxpY6mcNr                           



                          udJ7qBnQuSnCyBDhlmQmiCK                           



                          7qGEPxT7uayUHdPDTlGAMcP                           



                          0tmGeVguD1gtFjqQRxmwwPj                           



                          XKSzHqc3RFGfvZZtSATnXki                           



                          9ZQUuUYKyL80iwwxqAKN0wY                           



                          8gu0hlw9ByEGzkZRQ3QNMgs                           



                          19aAIenyrC6ZAccCi9dXuWy                           



                          TBY5SUbmTIU6gU==                           

 

             COMMENT (test code = a3bevNMiLKRhqVCoJgCuLGF                       

    



             3359)        bZSSgd3xcUJQgyIXqPsSzLr                           



                          NcZnRuYmpcdWMxXGRlZmYwe                           



                          8eff799lWXxk8jwIDDeHoN3                           



                          cCViDOKbtENsM154z0fju4y                           



                          mvxIvzPC2ZLTqVIS4HDuzsi                           



                          GaxsF5YQomwLViShN6WJaoi                           



                          qUoDRtajpMkhoTpWam8PSMc                           



                          X523ELQ8yFwxe5brTKW3YKG                           



                          eLYCrZfSfHk0myKZrJ815EC                           



                          YvFGVZYDFpiSu7ZNGgeySuq                           



                          tUmcLOSv831S855s0adUKJl                           



                          zgFcpYqLnnxmw6oxF750QPS                           



                          hcGVydzEyMjQwXHBhcGVyaD                           



                          Q5TGZfEN5tlglfRvLsMH8ol                           



                          gowPlWuFD6jmui0WqRwSA3h                           



                          cmdiNzIwXGhlYWRlcnkwXGZ                           



                          ji6HxizcqKT8iA2Nxs6Q3wB                           



                          9maXRcZGVmdGFiNzIwXGZvc                           



                          w3xmZZwCRasr9ZrKAN1ikA6                           



                          cYEskKIdCOQyZG59Cautn2W                           



                          dPxmfINT3SQFgvtClj4Qoy5                           



                          azOjVaxiJlI3buQ4BdWOOlH                           



                          SZtWAWvSfQibsTez1Mkc9Tk                           



                          tRDujXv3h6qkZRAcVTLbtGu                           



                          ew2wjVZN3RWEcH4T9cBRni7                           



                          jvNRfpHGDtlAW3gzxhVLqvL                           



                          LBmmhD7jfspROmaUTShzLK7                           



                          bhegNZvvUGEcAgT5hwboUUv                           



                          dLLOdDWM0WCfew291XHO9QA                           



                          xzYmtwYWdlXHBnbmNvbnRcc                           



                          GduZGVjXHBsYWluXHBsYWlu                           



                          XGYwXGZzMjRccWxccGxhaW5                           



                          uUrNiVaFqYQoiMX9tTTTqP9                           



                          jumFJgKIBiWFGmK3eqEcFoa                           



                          B8acVhzSMapucOrIOHlQBS7                           



                          yP9cooEkfqPaYIMhSLPfWKO                           



                          ajWNjosXfN3xfmLBxCXGdOM                           



                          YehM2tFDBoDWWcuXHxvlHmX                           



                          2EnkL4gRnF9OMwdKKzrwSrc                           



                          F8edLIKvs6ZttVsqCC5wkfl                           



                          qqvAbhJ8idLpeIVZpXoFrxR                           



                          yxf3ZeNvHsJJDprn4=                           

 

             SYNOPTIC REPORT (test URINARY BLADDER:                           



             code = 71)   Cystectomy, Anterior                           



                          Exenteration  (Bladder                           



                          - All Specimens) 8th                           



                          Edition - Protocol                           



                          posted: 2019                           



                          SPECIMEN   Procedure:                           



                           Partial cystectomy                           



                          TUMOR   Tumor Site:                           



                          left urinary bladder                           



                          diverticulum                           



                          Histologic Type:                           



                          Urothelial carcinoma                           



                          invasive    Histologic                           



                          Grade:    High-grade                           



                          Tumor Size:    Greatest                           



                          Dimension                              



                          (Centimeters): 4 cm                           



                          Additional Dimension                           



                          (Centimeters):    2 cm                           



                             Additional Dimension                           



                          (Centimeters):    0.7                           



                          cm   Tumor Extension:                           



                           Tumor invades                           



                          perivesical soft tissue                           



                               :                                 



                          Microscopically                           



                          Lymphovascular                           



                          Invasion:    Not                           



                          identified    Tumor                           



                          Configuration:    Solid                           



                          / nodule  MARGINS                           



                          Margins:    Uninvolved                           



                          by invasive carcinoma                           



                          and carcinoma in situ /                           



                          noninvasive urothelial                           



                          carcinoma  LYMPH NODES                           



                           Number of Lymph Nodes                           



                          Involved:    0                           



                          Number of Lymph Nodes                           



                          Examined:    1                           



                          PATHOLOGIC STAGE                           



                          CLASSIFICATION (pTNM,                           



                          AJCC 8th Edition)                           



                          Primary Tumor (pT):                           



                          pT3a    Regional Lymph                           



                          Nodes (pN):    pN0                           



                          ADDITIONAL FINDINGS                           



                          Associated Epithelial                           



                          Lesions:    None                           



                          identified                             



                          Additional Pathologic                           



                          Findings:                              



                          Inflammation /                           



                          regenerative changes                           

 

             CPT Code(s) (test code [id number]                     

      



             = 3357)      oTZVhx3itXSMtmMEqFsGwCq                           



                          NcZnRuYmpcdWMxXGRlZmYwe                           



                          5vku382xWXoj1cbBSMuOqA6                           



                          eNLwQOVabLNuD638QDXfXYy                           



                          nf4iiq9SrKRJkxEKju1O5AT                           



                          ZBogvpnLs8wVqjL79kj9F7Q                           



                          xecN3pbFNAvCEMvA5OnWK3f                           



                          BHRtEdo7PZU5MFA4SWFmOJJ                           



                          vK6BzIM4jDRYmmVFwRHk2u1                           



                          cgiAerRTSwRHR8r0lvOTjwu                           



                          yMuRP7ccd0umWf9l1mwxxGp                           



                          HIGhRNQcaLRHPOSuC3QnuQn                           



                          aRq3tuEs7aIykFruwIUF4Cy                           



                          q4XY6iku62yhj9nEnsOSZit                           



                          zktFwE3TUtnCUXelhftTLi2                           



                          MFxtYXJnbDcyMFxtYXJncjc                           



                          yMFxtYXJndDcyMFxtYXJnYj                           



                          xxBBccZOZgOUE6HUtsl137T                           



                          AS9ZMfpa7pfs8mzuBOkUwl2                           



                          QWRpGfZdNwixYVwif4Ssb0i                           



                          vRNUhlt4xZUA1vNIdsVkxy5                           



                          M1lRCgHPIxvQUrnjEmVHBgH                           



                          eM3XQztKW9ujs61OJKmDLT9                           



                          bk4ebHSfdWgkanYkePZdVDa                           



                          vQ5DrMRWcz051OEMpL5IrHR                           



                          Arv4L0goLvHvJlVCVawTW4l                           



                          sC7AOBbZDs1mZCeupR5qtYt                           



                          qBRgI8bguJ24AgGwyDCaM1I                           



                          ktY41QjTgiPJnD4LkbK77Hd                           



                          QahNRiX3JraN60JnMhsPIlJ                           



                          OYjzUOtCb7tlKYlzSAgt5Mp                           



                          lYBjZHpzG08ax470WIXveyN                           



                          yO7fmeWEalpbecMHhmkkgVW                           



                          pliwE8LWOsDCUdDRndUFAuR                           



                          GZzMjBcbGFuZzEwMzNcaGlj                           



                          mUveFImdSrEmFAGaPWvaZ6l                           



                          qZiVcZuZrWKF4BHUoBJmwZB                           



                          CeSBxzACZzwESsSJn1ZdQ8P                           



                          HBhcn0=                                

 

             CLINICAL HISTORY (test d7jrvPNmHMBcxOOwHlBiPZR                     

      



             code = 3356) hRJFys6dhQCOnvZYqKbIeNa                           



                          NcZnRuYmpcdWMxXGRlZmYwe                           



                          6hhw739dRTdm4cqTMDjDcV9                           



                          nYYpMRRinFPaV749ZNGxOSq                           



                          gp3wte8ClONMxdVPvi5K4EB                           



                          OTiozdkOy7mBfaE53hn8S0E                           



                          cduT5qyGPHjXTqzXPYiJHex                           



                          eXTpPXK6RXUcSWZ3OMbfzkR                           



                          oiuB4BZtezBLaWmP1HTb9q6                           



                          clcAtjOVCbRFJ8i9wfWAzkt                           



                          rGoPD3ska4cgZp1j3gdrxEw                           



                          YDPnXKCdkXJQJVCcO4JpzRv                           



                          pDi1lnMz0hFyoBzgfNNP6Wy                           



                          t8JH4qne76jjj2lSovQPFai                           



                          rdfSpX9YHtmEKSkdpigOYl8                           



                          MFxtYXJnbDcyMFxtYXJncjc                           



                          yMFxtYXJndDcyMFxtYXJnYj                           



                          sdRLitZUOnUYZ6OUkhv448Z                           



                          EA3SCqco3nga6ntdBSbUcu3                           



                          CQOhSdOzCknbNIjtw7Kww1l                           



                          vJXWxet5qVMB4yANyrLbwv5                           



                          Q9dRYxQPQwuMLgrtWiOLHvX                           



                          uD7TOszVY3gki97NVVbONF9                           



                          ks2lwWZjwVayojYjkNOaFWv                           



                          aR7QaCNCgl507QQKpY1ZzQN                           



                          Vkt3Y5fyEzMjKnWLYkoVS5d                           



                          xX3DAMjKHn8vOPvxlJ8sgRf                           



                          iASiN2qieD17XvTmoJVmT7Y                           



                          vrO65CoOewDTwQ9OpoN59Vb                           



                          PrkBUlW4CleX38QxGmfDTfJ                           



                          XHnsQZgKt3liAGoiWOod1Si                           



                          gVNjYUxhL06nl641JNVgszN                           



                          mM3avlXKkhfiyjMUwtobiLE                           



                          xmczIwXHFsXHBsYWluXGYwX                           



                          OHwNmVmnZnxsC1iRtCkFyCf                           



                          XGUPmhBjDC2kMTWkn1XpzID                           



                          loVLnxj5mlSX4JCWnHYKlEG                           



                          LahAIpf7XfqDFzQPVlc8EhA                           



                          VRrTJpgW7lixSDtyB1twJbq                           



                          CCq2VGT8rDN3nXKufZ5tdEn                           



                          gYmxhZGRlciBhbmQgZGlzc2                           



                          RtpOwwbvWwxC0eoNWwf4NcN                           



                          QYzxPMdQ4frKZD3                           

 

             SPECIMEN SOURCE (test j4klmJOpGBYjzKRbDlDqDUZ                      

     



             code = 3377) mXDPwi9hdEPLvwWAtFvUvSa                           



                          NcZnRuYmpcdWMxXGRlZmYwe                           



                          2syb377gNPfm7laAXDvRdJ5                           



                          rOOlSXNxfPFoK807m8kny7s                           



                          fswJtjBD1GMVaWZV9ULpipu                           



                          QehdP5RXzhaDZhYrI4QBesb                           



                          qEaCAseesVnxqShQda7WOUn                           



                          Y807MVS5zBqbv3luAMV6KWQ                           



                          zVVZaStTcUq8itVSgS301XH                           



                          YlORVFRCSneTy6TKSppvEea                           



                          eKsfTNSk952G608x0ntEEOu                           



                          glLbyVzQsmxwb0vcW838SPM                           



                          hcGVydzEyMjQwXHBhcGVyaD                           



                          Z9HWDaNV4lmujoYmSmCC3lw                           



                          sjdEfOzKA5jbmt5LoUnGH9p                           



                          cmdiNzIwXGhlYWRlcnkwXGZ                           



                          kf0WfbfvfLU7kS5Dyq8F2zV                           



                          9maXRcZGVmdGFiNzIwXGZvc                           



                          d6wzQYsWKksv3SlBNU9ysK8                           



                          qDDnoJInMZAgEA23Zzdvm1N                           



                          gRzbzDDR6NFQtndXmq4Kxr4                           



                          ooSuJgnzWtU7cvX3VcLBDvR                           



                          PQoJLYaYuNilsUff5Ymu7Td                           



                          iBWhtYj9m8qbTMZwASMxlIs                           



                          ei5ahZFQ7GVFfK2Z2qYYke2                           



                          miPThcFZHbaVX0ybvxKMrmX                           



                          MWfmuL2uqmcDOfdLCVijDT1                           



                          qfipLBzyUTIcUrJ9rnilEOp                           



                          gQYEwFLW4CIayd223TVV3VA                           



                          xzYmtwYWdlXHBnbmNvbnRcc                           



                          GduZGVjXHBsYWluXHBsYWlu                           



                          XGYwXGZzMjRccWxccGxhaW5                           



                          mEvJzOwDoYBnkIW0zLYHbD9                           



                          ckdNUfSTVtPAKfR5ubRiGgk                           



                          X6tyWcySEeeiwRhESPiGOXw                           



                          vQJfUIKyzVyqk9AzTGJbTQL                           



                          odlRxdODbdKPvGAI8AQOySB                           



                          DsDRXsJJIlGzopcKO9IWttB                           



                          nQgYmxhZGRlciBkaXZlcnRp                           



                          T5JkoI5kBPCmok6=                           

 

             GROSS DESCRIPTION p1rnlPPnITRtxFPfJeUqKCF                          

 



             (test code = 3366) zPQBsq2bzRRPyuHEdScStDc                         

  



                          NcZnRuYmpcdWMxXGRlZmYwe                           



                          3ztn673vKKzm4kmXWJsXaZ0                           



                          iVNwTRDivFFzS003AAGtTLs                           



                          cp9hjr8MwPKAelGBkz7T7JN                           



                          DNquvkmLv2nLjqV74lm3K5M                           



                          obwH5hnYYSePMAwN8MnGT9i                           



                          OPJiEus5ZOK2KRC6HJSxHVJ                           



                          zF4PlLN7mDDUvpQXvLIm9b5                           



                          xzcIrbECAaAMT4s6eeMLaap                           



                          rDsTB6uen4puMq2m5gpkkHj                           



                          EHMlGBNqjBIBHRJoT5FckOh                           



                          nIc2ekEj7cFpqKpawLIJ6Bn                           



                          t7KL8iqe57ciz6jCikEWIwq                           



                          futAaC3NFsjDJOeqtijIEl7                           



                          MFxtYXJnbDcyMFxtYXJncjc                           



                          yMFxtYXJndDcyMFxtYXJnYj                           



                          oiKMzeAQAbQRN1XGoyi457Q                           



                          LP6FXzqz1kvk8dniVAnRot5                           



                          JIWdIlLdBzztIJcid2Ctb9v                           



                          uCOUnjt2fHFO1wRJrgOzmh6                           



                          R5dDRzBKLlaBUbhmPnYNSrL                           



                          uL7BVgqCO2pfh45DIOiFFG2                           



                          yo3buPYwnLifouYxiVMmDIi                           



                          hJ5ZdNUKan950PXCfJ4RrQZ                           



                          Yzd8J1waHtCjTfCOKbmYS6n                           



                          iH5PIXrMGv6wDCdfaS5dqLu                           



                          pPMoX9capO72YxBziLPwK9O                           



                          ztO82TjLleNEaQ4WfoF54Ur                           



                          IygDYuW5IvmU10HbVecQLiQ                           



                          ZQvnVHiRv7uoVXavIJlz3Mb                           



                          bWWwSWxeL16nt898BWIoarB                           



                          xK4zdxQLicjwuqPDozzlcKN                           



                          jldgN3MSTyCCAxNNspDTZlF                           



                          GZzMjBcbGFuZzEwMzNcaGlj                           



                          xYkoZVfjAeJgDQChDYfeM3d                           



                          cZjFcZnMyMCBBLiAgVGhlIH                           



                          ZrVRGkpVDrZNgtUUVgL5Pfy                           



                          hLhSRjpPZWlcg2elIacIMgr                           



                          QhWcLUFze4a5eFX2hAQhaZW                           



                          7hUTgvRqyOP2flCJoWLEfG2                           



                          Joz8kctuKlsY8vRDQaLV6xR                           



                          VGnENEqhKKyRbFeebVwP44j                           



                          c0icoZPzv6EmHxI1DAKueGZ                           



                          cOpRtI80biZCbUEwasOoajl                           



                          ExuzAtjTeuamHwkJQth4Jpx                           



                          PV7OAZii9Z8EVWessEvV2rj                           



                          QCskaHUsDPXYfuHuzV8dqQR                           



                          gh1UkQEayBUknx1EvaMegxI                           



                          GqfqNeYaosOB1pQJGjDLNlr                           



                          URfcU9htzXovlRhiENmpZL3                           



                          LAWcPN11xSMsuSfxpW5cU3S                           



                          ot9T3gYRmMJVjFIOuMntgYE                           



                          IwgBUoBORtXFISfKJlg8AvH                           



                          8atNL6svXXpkiWmSTs6XYUh                           



                          QjChs2uqJv6uJMnevNTie7J                           



                          kfyE1lNLsHDBtkmC9eHVqnE                           



                          emduYpsULahk8vxDCypNAuB                           



                          FltXZD1cIZvDBXdTDEaJAWj                           



                          DZ54Z5FbbsTxFHiuUPNvTCG                           



                          doE6hSG85bOIouykjAG3bWT                           



                          BxwMKmSJJmYPIbc0VtrPL0r                           



                          MBnuCLbTMLgblPqn0GwUR8p                           



                          CMFiyET4UvdaaEFfNoQkZ63                           



                          gdGFuLXBpbmsgYmxhZGRlci                           



                          SstFGjwwNiW4TarS2uhIigg                           



                          4HjZEostOpoFT10BKwnGT00                           



                          UEHfQP9cCM2ghvyst0yxE6w                           



                          veVZkfT0tJXDrHep4WM2dRL                           



                          RoZSBvdXRlciBzdXJmYWNlI                           



                          LejRXMdji3aqV3jNURbzHAk                           



                          qtTttOHqKY3lBSSuR8pnXJD                           



                          6rTZfIO8cEBzbc8EciOdkxw                           



                          zdqPDqVUO6fA0wdh0gXVWyD                           



                          XMwgIXatA3oppBcxpGeugae                           



                          ggLtPNYeLIVsj4VaV3IwozR                           



                          qwiVswF6rQBBwDCIBme73IR                           



                          0ft3VjbJpyuxDxpsDcKNFtp                           



                          7VoLBQpZZEFtB5fRY2sSI2d                           



                          qdmtgZwwszPpgDWwDTR3VTs                           



                          eDcCchIB3VI7lKwAaq74eLE                           



                          3pE5CcgIkbORW8aR4dAXSbZ                           



                          WWxcmHkQNIjOMC6yH9krkSa                           



                          jgR4MO6oxUkhtkUepeEkBjX                           



                          fn31wVPVBySUrhtHpl4YudA                           



                          FlAFLxWTRlyALrtl1kJW5hV                           



                          UCbk6KjkDKnL6eiKWrkcCVe                           



                          whUsF7TocX8iugClzybzxI1                           



                          yLFHnDno1WZsrLBTIaISzm3                           



                          HwJ5ffGV7huMTmJ4Kmp4Yev                           



                          9FikQxajvVoKOP4tTXatTEb                           



                          v9IkeZZeMQ29BCFwBPXiuUS                           



                          yQTDrIZOoZtH9zkI4fLMzeI                           



                          1wDAWdv8W5TDTgXcR1dQipn                           



                          Sfpn2GhPzApNLbzOGL2zF9d                           



                          GFygSRaoR0I4TUMbJxEriZH                           



                          mxw2eJBRqZRJarsHzeqxmdc                           



                          C5EGsnWSJfYiDhW99fbS2mu                           



                          GwmMNWry0LjzlyjpaK8XCrl                           



                          DIHsTnGsO10wbD6eqPWsuLx                           



                          mKKmfv8EqhBpmXM4eFRVatM                           



                          VaKUNjMZRgtZB3NYbtx8Ppf                           



                          G4zNCerNTUkdGCoWIHveWEs                           



                          g1ZjxNY5fVQcWNUpO4Ycy99                           



                          cRFCeWDIewVKmhRR8HNJzJM                           



                          XvnxeuWBUzPC5mUROjMIO3T                           



                          URwnGVhTAYqrUSyd9BpV0kg                           



                          XYeikAIxI7ntLTsqbENlpuT                           



                          iH0QjpV8mzmRybipzSRQmkZ                           



                          VxLBDbSZSjNKCuiAEdwH4zE                           



                          XfwcTpoZBJuvLNaWZ8nWB2o                           



                          MJ9oc2L8NCDxm3Hvq7LopX6                           



                          pmNDkDEfuZVEqGXBfz4oerP                           



                          IbMPcdsVrwSVZlmjMEwY7fA                           



                          TCbSJjloIVsTEfvkFeaSD7t                           



                          pZYdsP3nlKcsFT5kdevvjdk                           



                          sOGMmEtb8AJ6ixRT9ZKghw3                           



                          FdvBjbHIOqaCNiMENcG8Fjd                           



                          23qV67aLQbyJFMHRrZsGFLG                           



                          xUTmjbQtU4IvvN9rsuNtgoS                           



                          dOZAfkW0dTTHqJYJmQ5V2XG                           



                          EKKl2ZLgZouVIyZ7MvMUY2j                           



                          Y0bedX1gfFxvLLnnmWaY0Qn                           



                          oD0ttrRmyfUcBVEirR3yLKU                           



                          jhhRdgbYrD7ZxRPEjn2JamS                           



                          wpfuLfSGR2HBV7PNUwGFSxV                           



                          1avn8VxlZQ4luD8fIEhiHIw                           



                          C4ysBH3eMDK3OIEgicTjqSj                           



                          gxHb5uUGjSRLrRP4vtwcgig                           



                          huloIuTRhjbH3jITDroiQfm                           



                          qOeV6GvRDEgv3IqcFqappAh                           



                          XOJNFY4SPWKtboSdANnedX5                           



                          lMVB4bY3oSNRrPXQrOLrylL                           



                          TlMLI6iV9wRFEnh9RirDz3d                           



                          QDwQIVuwTdjGNc7JvENNGDi                           



                          iiBchlFiPJ68VVJldrIsz8R                           



                          ykCizeyFcg2Hhrs0krQMyRS                           



                          22A42eTCwkEI54CYJza5YrX                           



                          P2rWXFrr1UxdpzpezJ1REyd                           



                          WrGpVeB7MNCurLIpr1CbsVF                           



                          6bJKhILMjY1Bny84ry9Wvvq                           



                          9bgHTkOD64H18oQNnxwGOme                           



                          YvhSLvze7FskK9xVLhTA2Lf                           



                          IFxwYXJ9                               

 

             INTRAOPERATIVE s6nszXVdCLFvsEMgAeRwPLJ                           



             CONSULTATION (test rSITki6ysHLEcsXZcFbUrFy                         

  



             code = 3360) NcZnRuYmpcdWMxXGRlZmYwe                           



                          9rqc169cGHfj6mgRUHoMdR3                           



                          oTYrQXLdgGHfO079k0wxj2m                           



                          yylQdxNX7FPQeVTN3RNkykf                           



                          RcqdP4JYvbqJNpWhC9SGbzd                           



                          sVeJMxioeBzbpUpSim2QYTz                           



                          L741EBA5yNtwa4apNYH6HLG                           



                          bTNWhIaCsLy0tzTSwS219BH                           



                          TkISAHOGZvjJa6QAJajsUix                           



                          gHgwGIFe033D801i1tbEEPp                           



                          cmItcKgOwcudb0vbN779WFQ                           



                          hcGVydzEyMjQwXHBhcGVyaD                           



                          Z6ZYBtEE7xidflFnUlFJ7ox                           



                          kpiEjJbZM0vxwx8DlDlFG6f                           



                          cmdiNzIwXGhlYWRlcnkwXGZ                           



                          yq6PlreurJN6zZ6Rff5I6gC                           



                          9maXRcZGVmdGFiNzIwXGZvc                           



                          y6lqVCpXPrvf3QaBGG5eaM9                           



                          yBYubGVuWFWzPC97Wmsvx4U                           



                          xGtypOIJ0CWHtayHqn6Nps7                           



                          wdYvYrrnNsF2nmM0WaQOUnO                           



                          EMqLZAaBmOaryIql3Ove0Tg                           



                          cBZpaDb9c8iaXRMzMOPyqHi                           



                          on8zpHZW6XGRqY2A4pYVyg1                           



                          alZXvnWFBheQG2yqxvZKpsS                           



                          PAzvuW7iniuJEkfPKIuqGD9                           



                          dqgvWZqdDFFjCcV5uhnxFMe                           



                          cJVRsRRP7JXpug750CCX0CE                           



                          xzYmtwYWdlXHBnbmNvbnRcc                           



                          GduZGVjXHBsYWluXHBsYWlu                           



                          XGYwXGZzMjRccWxccGxhaW5                           



                          tFzMaZmKoGJpgUA2jSEOrX5                           



                          hrlOUbHMZvSKVpI7xpItCgf                           



                          N9rsFryQYbqslGmTIIMPPMb                           



                          IEJMQURERVIgRElWRVJUSUN                           



                          BQOCMEXEXGAGAU7mBCgfjaQ                           



                          LrGXRjTDMBHUWDJU0YIjoDA                           



                          saoGkRVIDIQJjElZf1TPK3C                           



                          CLgMXyQTB1ewNLEsfxQuQNl                           



                          dCPWbNnHjhTy9icIoXRZrvp                           



                          XdSTK4esMEuw2oU25xp2tuX                           



                          UQeRUL9UYDyFP2bXiegLrhh                           



                          HUJqYYFupSfrc88kSiIfPFW                           



                          1LCAyMDIwLiBccGFyfQ==                           

 

             MICROSCOPIC  s2sybUTwXDRmaFAqOvQgCHV                           



             DESCRIPTION (test code iJGFge3xaOTQiaCPiAmFsTc                     

      



             = 3371)      NcZnRuYmpcdWMxXGRlZmYwe                           



                          3ago158cTHlq7keEZYeYbE6                           



                          oEWpLVNwdAAnP921y4qql6i                           



                          vqwSxbTB9MSZeJDV3SSbyis                           



                          KecrT6UTkwoAJtVyE9SNrvp                           



                          wLuSYyytlAtqeKvZgi4XUSs                           



                          S096YFU4yZzww0nnBJE4HCT                           



                          sLTUyHsLsFs7jsHMqB203WC                           



                          YoYFKCVOUrbWa7IASolnYhj                           



                          tPdnDBUx826H251f8shRGAx                           



                          wjXlbDgLxwbww1gxJ474CDU                           



                          hcGVydzEyMjQwXHBhcGVyaD                           



                          D0OOHmAU0eqocfPjTfDI2jh                           



                          lzaHpEbPF7tuno9JnSlQB1z                           



                          cmdiNzIwXGhlYWRlcnkwXGZ                           



                          qq1OuyswkEC1tC8Vjz6J1qU                           



                          9maXRcZGVmdGFiNzIwXGZvc                           



                          p8ajNIoGAemx1OtUHS7nbS6                           



                          jSLukYGyFDWkHM29Irgrq5U                           



                          hJwduLWB1YUNzikKbc3Wvc5                           



                          abCaCmkkLmV6oiZ7XlZCHzM                           



                          MSvJXDyPoXhteUwj5Hza2Dc                           



                          eFMmdTc1h5gzTRKcOYHdhUn                           



                          uj3xsIUA9IQQqQ4Z0wPEww7                           



                          goKUzwZWOpwKI5dhnsRQkcF                           



                          ZOzhqS4wobaQXvdLWBcdFG7                           



                          qhcvAKrmJGEdRrH5bmenLUk                           



                          iBREnAGG8KVnoo611UOE8XA                           



                          xzYmtwYWdlXHBnbmNvbnRcc                           



                          GduZGVjXHBsYWluXHBsYWlu                           



                          XGYwXGZzMjRccWxccGxhaW5                           



                          tCgZnSvUkEPvbFJ3xVSXqF1                           



                          wwpKUnEQYuLQLiQ7fsFzMhe                           



                          X2ftKvxYIncgzEqBJYkrgTx                           



                          qq4qGR7ssICtqR==                           



[facility] [id number] 17:37:00Surgical Pathology 
Report                         Case: [id number]                                 
Authorizing Provider:  Marcus Bazzi MD       Collected:           
2020 1020            Ordering Location:     [facility] PERIOPERATIVE         
Received:            2020 1129              SERVICES                      
                                            Pathologist:           Ricky Dasilva MD                                                       
                 Specimens:   A) -Fat Pad, ANDRÉS-VESICAL FAT                     
                                              B) - Bladder Biopsy, left bladder 
diverticulum                                       A. LYMPH NODE, LABELED "ANDRÉS-
VESICAL FAT", EXCISION:   - ONE BENIGN LYMPH NODE (0/1)B. URINARY BLADDER, LEFT 
DIVERTICULUM, OPEN PARTIAL CYSTECTOMY, DIVERTICULECTOMY:   - UROTHELIAL 
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   
  Signing Pathologist Direct Phone Line: [phone] signed by 
Ricky Dasilva MD on 2020 at  5:37 PMThe tumor invades the 
superficial fat around the diverticulum but is well clear of the margins in the 
soft tissue.  URINARY BLADDER: Cystectomy, Anterior Exenteration  (Bladder - All
Specimens)8th Edition - Protocol posted: [email or web address]   Procedure:    
Partial cystectomy TUMOR   Tumor Site:    left urinary bladder diverticulum    
Histologic Type:    Urothelial carcinoma invasive    Histologic Grade:    High-
grade    Tumor Size:Greatest Dimension (Centimeters): 4 cm     Additional 
Dimension (Centimeters):    2 cm     Additional Dimension (Centimeters):    0.7 
cm   Tumor Extension:    Tumor invades perivesical soft tissue:    
Microscopically    Lymphovascular Invasion:    Not identified    Tumor 
Configuration:    Solid / nodule MARGINS   Margins:    Uninvolved by invasive 
carcinoma and carcinoma in situ / noninvasive urothelial carcinoma LYMPH NODES  
Number of Lymph Nodes Involved:    0    Number of Lymph Nodes Examined:    1 
PATHOLOGIC STAGE CLASSIFICATION (pTNM, AJCC 8th Edition)   Primary Tumor (pT):  
 pT3a    Regional Lymph Nodes (pN):    pN0 ADDITIONAL FINDINGS   Associated 
Epithelial Lesions:    None identified    Additional Pathologic Findings:    
Inflammation / regenerative changes [id number] and postop diagnosis: 
bladder tumorProcedure: cystectomy, diverticulectomy, bladder and dissection 
lymph node pelvicA. Fat pad tissue, perivesical fat; B. Bladder biopsy left 
bladder diverticulum A.  The specimen is received in formalin labeled with the 
patient's name, accession number and "fat pad" and consists of 3 x 2 x 0.5 cm 
tan-yellow and pink fibrofatty tissue in aggregate.  No lymph node is grossly 
identified.  The specimen is submitted entirely in cassettes A1-A2.B.  The 
specimen is received fresh for intraoperative consultation diagnosis labeled 
with the patient's name, accession number, and "bladder biopsy tissue" consists 
of 5 x 4.9 x 1.2 cm tan-pink bladder diverticulum tissue with 0.6 x 0.5 cm 
opening which is inked blue.  The outer surface is tan-pink, hyperemic and 
ragged with no grossly visible tumor.  The specimen is oriented per surgeon and 
inked.  Frozen section is performed.  Upon opening there is a 4 x 2 cm tan-
brown, necrotic tumor present. The tumor is tan-pink and brown.  Itis located 
0.4 cm from nearest margin (diverticulum neck, inked blue).  The specimen is 
cross sectioned, the tumor is 0.7 cm thick and abuts the inked outer fatty 
tissue.  The tumor is located 2 cm from the anterior wall, 1.2 cm to the 
posterior wall, 2.2 cm to proximal wall, and abuts the distal wall.  
Representative sections are submitted.Ink code:  Blue-surgical margin 
(diverticulum neck).  Black-anterior wall.  Orange-posterior wall.Green-proximal
wall Violet-distal wall, next to the marginBlue-distal wallSection code:  FSB1 -
Diverticulum neck margin, en face;  B2-B6 bisected tumor to diverticulum neck 
margin, perpendicular sections (B4-B5 area closest to the margin).  B7 
diverticulum neck margin, remaining perpendicular sections.  B8-B12 remaining 
tumor, serially sectioned submitted entirely; B13 representative sections of 
normal mucosa, anterior and posterior wall.  B14 representative section of 
normal mucosa, proximal wall.  HS/bc FSA1, BLADDER DIVERTICULUM, EXCISION:  - 
NECK MARGIN, NEGATIVE FOR MALIGNANCY   Results reported to Dr. Bazzi at 11:52 
a.m., by Dr. Ledesma on 2020. Performed.Basic Metabolic Panel - In AM
2020 07:10:00





             Test Item    Value        Reference Range Interpretation Comments

 

             Sodium (test code = 136 meq/L    136-145                   



             2951-2)                                             

 

             Potassium (test code = 3.3 meq/L    3.5-5.1      L            



             2823-3)                                             

 

             Chloride (test code = 105 meq/L                        



             5-0)                                             

 

             CO2 (test code = 25 meq/L     22-29                     



             -9)                                             

 

             BUN (test code = 5 mg/dL      7-21         L            



             3094-0)                                             

 

             Creatinine (test code 0.72 mg/dL   0.57-1.25                 



             = 2160-0)                                           

 

             Glucose (test code = 122 mg/dL           H            



             2345-7)                                             

 

             Calcium (test code = 9.0 mg/dL    8.4-10.2                  



             12998-6)                                            

 

             EGFR (test code = 106          mL/min/1.73 sq m              ESTIMA

ROSALBA GFR IS



             33914-3)                                            NOT AS ACCURATE



                                                                 AS CREATININE



                                                                 CLEARANCE IN



                                                                 PREDICTING



                                                                 GLOMERULAR



                                                                 FILTRATION RATE

.



                                                                 ESTIMATED GFR I

S



                                                                 NOT APPLICABLE



                                                                 FOR DIALYSIS



                                                                 PATIENTS.

 

             PHILIPP (test code = PHILIPP)  ID -                           



                          LUZBRENTON SARA                                

 

             Lab Interpretation Abnormal                               



             (test code = 98747-0)                                        



[facility] METABOLIC [id number] 07:10:00





             Test Item    Value        Reference Range Interpretation Comments

 

             SODIUM (BEAKER) 136 meq/L    136-145                   



             (test code = 381)                                        

 

             POTASSIUM (BEAKER) 3.3 meq/L    3.5-5.1      L            



             (test code = 379)                                        

 

             CHLORIDE (BEAKER) 105 meq/L                        



             (test code = 382)                                        

 

             CO2 (BEAKER) (test 25 meq/L     22-29                     



             code = 355)                                         

 

             BLOOD UREA NITROGEN 5 mg/dL      7-21         L            



             (BEAKER) (test code                                        



             = 354)                                              

 

             CREATININE (BEAKER) 0.72 mg/dL   0.57-1.25                 



             (test code = 358)                                        

 

             GLUCOSE RANDOM 122 mg/dL           H            



             (BEAKER) (test code                                        



             = 652)                                              

 

             CALCIUM (BEAKER) 9.0 mg/dL    8.4-10.2                  



             (test code = 697)                                        

 

             EGFR (BEAKER) (test 106 mL/min/1.73                           ESTIM

ATED GFR IS



             code = 1092) sq m                                   NOT AS ACCURATE

 AS



                                                                 CREATININE



                                                                 CLEARANCE IN



                                                                 PREDICTING



                                                                 GLOMERULAR



                                                                 FILTRATION RATE

.



                                                                 ESTIMATED GFR I

S



                                                                 NOT APPLICABLE 

FOR



                                                                 DIALYSIS PATIEN

TS.



 ID - JANELLE [facility] (Hemogram only)2020 04:54:00





             Test Item    Value        Reference Range Interpretation Comments

 

             WBC (test code = 6690-2) 8.6          See_Comment                [A

utomated message]



                                                                 The system [employer]



                                                                 generated this 

result



                                                                 transmitted ref

erence



                                                                 range: 3.5 - 10

.5



                                                                 K/L. The refe

rence



                                                                 range was not u

sed to



                                                                 interpret this 

result



                                                                 as normal/abnor

mal.

 

             RBC (test code = 789-8) 3.47         See_Comment  L             [Au

tomated message]



                                                                 The system [employer]



                                                                 generated this 

result



                                                                 transmitted ref

erence



                                                                 range: 4.63 - 6

.08



                                                                 M/L. The refe

rence



                                                                 range was not u

sed to



                                                                 interpret this 

result



                                                                 as normal/abnor

mal.

 

             MCHC (test code = 786-4) 34.0         See_Comment  L             [A

utomated message]



                                                                 The system [employer]



                                                                 generated this 

result



                                                                 transmitted ref

erence



                                                                 range: 32.3 - 3

6.5



                                                                 GM/DL. The refe

rence



                                                                 range was not u

sed to



                                                                 interpret this 

result



                                                                 as normal/abnor

mal.

 

             Hematocrit (test code = 34.4 %       40.1-51      L            



             4544-3)                                             

 

             MCV (test code = 787-2) 99.1 fL      79-92.2      H            

 

             MCH (test code = 785-6) 33.7 pg      25.7-32.2    H            

 

             RDW (test code = 788-0) 12.9 %       11.6-14.4                 

 

             Platelets (test code = 141          See_Comment  L             [Aut

omated message]



             777-3)                                              The system [employer]



                                                                 generated this 

result



                                                                 transmitted ref

erence



                                                                 range: 150 - 45

0 K/CU



                                                                 MM. The referen

ce



                                                                 range was not u

sed to



                                                                 interpret this 

result



                                                                 as normal/abnor

mal.

 

             MPV (test code = 10.7 fL      9.4-12.4                  



             12784-8)                                            

 

             nRBC (test code = 413) 0            See_Comment                [Aut

omated message]



                                                                 The system [employer]



                                                                 generated this 

result



                                                                 transmitted ref

erence



                                                                 range: 0 - 0 /1

00



                                                                 WBC. The refere

nce



                                                                 range was not u

sed to



                                                                 interpret this 

result



                                                                 as normal/abnor

mal.

 

             Lab Interpretation (test Abnormal                               



             code = 43631-7)                                        



[facility] (HEMOGRAM ONLY)2020 04:54:00





             Test Item    Value        Reference Range Interpretation Comments

 

             WHITE BLOOD CELL COUNT (BEAKER) 8.6 K/ L     3.5-10.5              

    



             (test code = 775)                                        

 

             RED BLOOD CELL COUNT (BEAKER) 3.47 M/ L    4.63-6.08    L          

  



             (test code = 761)                                        

 

             HEMOGLOBIN (BEAKER) (test code = 11.7 GM/DL   13.7-17.5    L       

     



             410)                                                

 

             HEMATOCRIT (BEAKER) (test code = 34.4 %       40.1-51.0    L       

     



             411)                                                

 

             MEAN CORPUSCULAR VOLUME (BEAKER) 99.1 fL      79.0-92.2    H       

     



             (test code = 753)                                        

 

             MEAN CORPUSCULAR HEMOGLOBIN 33.7 pg      25.7-32.2    H            



             (BEAKER) (test code = 751)                                        

 

             MEAN CORPUSCULAR HEMOGLOBIN CONC 34.0 GM/DL   32.3-36.5            

     



             (BEAKER) (test code = 752)                                        

 

             RED CELL DISTRIBUTION WIDTH 12.9 %       11.6-14.4                 



             (BEAKER) (test code = 412)                                        

 

             PLATELET COUNT (BEAKER) (test 141 K/CU MM  150-450      L          

  



             code = 756)                                         

 

             MEAN PLATELET VOLUME (BEAKER) 10.7 fL      9.4-12.4                

  



             (test code = 754)                                        

 

             NUCLEATED RED BLOOD CELLS 0 /100 WBC   0-0                       



             (BEAKER) (test code = 413)                                        



BASIC METABOLIC [id number] 05:59:00





             Test Item    Value        Reference Range Interpretation Comments

 

             SODIUM (BEAKER) 132 meq/L    136-145      L            



             (test code = 381)                                        

 

             POTASSIUM (BEAKER) 3.8 meq/L    3.5-5.1                   Specimen 

slightly



             (test code = 379)                                        hemolyzed

 

             CHLORIDE (BEAKER) 105 meq/L                        



             (test code = 382)                                        

 

             CO2 (BEAKER) (test 21 meq/L     22-29        L            



             code = 355)                                         

 

             BLOOD UREA NITROGEN 5 mg/dL      7-21         L            



             (BEAKER) (test code                                        



             = 354)                                              

 

             CREATININE (BEAKER) 0.72 mg/dL   0.57-1.25                 Specimen

 slightly



             (test code = 358)                                        hemolyzed

 

             GLUCOSE RANDOM 127 mg/dL           H            



             (BEAKER) (test code                                        



             = 652)                                              

 

             CALCIUM (BEAKER) 8.4 mg/dL    8.4-10.2                  



             (test code = 697)                                        

 

             EGFR (BEAKER) (test 106 mL/min/1.73                           ESTIM

ATED GFR IS



             code = 1092) sq m                                   NOT AS ACCURATE

 AS



                                                                 CREATININE



                                                                 CLEARANCE IN



                                                                 PREDICTING



                                                                 GLOMERULAR



                                                                 FILTRATION RATE

.



                                                                 ESTIMATED GFR I

S



                                                                 NOT APPLICABLE 

FOR



                                                                 DIALYSIS PATIEN

TS.



 ID - PIAYA LCBC (HEMOGRAM ONLY)2020 04:53:00





             Test Item    Value        Reference Range Interpretation Comments

 

             WHITE BLOOD CELL COUNT (BEAKER) 10.2 K/ L    3.5-10.5              

    



             (test code = 775)                                        

 

             RED BLOOD CELL COUNT (BEAKER) 3.33 M/ L    4.63-6.08    L          

  



             (test code = 761)                                        

 

             HEMOGLOBIN (BEAKER) (test code = 11.3 GM/DL   13.7-17.5    L       

     



             410)                                                

 

             HEMATOCRIT (BEAKER) (test code = 33.2 %       40.1-51.0    L       

     



             411)                                                

 

             MEAN CORPUSCULAR VOLUME (BEAKER) 99.7 fL      79.0-92.2    H       

     



             (test code = 753)                                        

 

             MEAN CORPUSCULAR HEMOGLOBIN 33.9 pg      25.7-32.2    H            



             (BEAKER) (test code = 751)                                        

 

             MEAN CORPUSCULAR HEMOGLOBIN CONC 34.0 GM/DL   32.3-36.5            

     



             (BEAKER) (test code = 752)                                        

 

             RED CELL DISTRIBUTION WIDTH 13.1 %       11.6-14.4                 



             (BEAKER) (test code = 412)                                        

 

             PLATELET COUNT (BEAKER) (test 119 K/CU MM  150-450      L          

  



             code = 756)                                         

 

             MEAN PLATELET VOLUME (BEAKER) 10.7 fL      9.4-12.4                

  



             (test code = 754)                                        

 

             NUCLEATED RED BLOOD CELLS 0 /100 WBC   0-0                       



             (BEAKER) (test code = 413)                                        



BASIC METABOLIC [id number] 05:50:00





             Test Item    Value        Reference Range Interpretation Comments

 

             SODIUM (BEAKER) 133 meq/L    136-145      L            



             (test code = 381)                                        

 

             POTASSIUM (BEAKER) 3.7 meq/L    3.5-5.1                   



             (test code = 379)                                        

 

             CHLORIDE (BEAKER) 105 meq/L                        



             (test code = 382)                                        

 

             CO2 (BEAKER) (test 22 meq/L     22-29                     



             code = 355)                                         

 

             BLOOD UREA NITROGEN 7 mg/dL      7-21                      



             (BEAKER) (test code                                        



             = 354)                                              

 

             CREATININE (BEAKER) 0.82 mg/dL   0.57-1.25                 



             (test code = 358)                                        

 

             GLUCOSE RANDOM 134 mg/dL           H            



             (BEAKER) (test code                                        



             = 652)                                              

 

             CALCIUM (BEAKER) 8.3 mg/dL    8.4-10.2     L            



             (test code = 697)                                        

 

             EGFR (BEAKER) (test 91 mL/min/1.73                           ESTIMA

ROSALBA GFR IS



             code = 1092) sq m                                   NOT AS ACCURATE

 AS



                                                                 CREATININE



                                                                 CLEARANCE IN



                                                                 PREDICTING



                                                                 GLOMERULAR



                                                                 FILTRATION RATE

.



                                                                 ESTIMATED GFR I

S



                                                                 NOT APPLICABLE 

FOR



                                                                 DIALYSIS PATIEN

TS.



 ID - OTILIA MCBC (HEMOGRAM ONLY)2020 05:22:00





             Test Item    Value        Reference Range Interpretation Comments

 

             WHITE BLOOD CELL COUNT (BEAKER) 10.1 K/ L    3.5-10.5              

    



             (test code = 775)                                        

 

             RED BLOOD CELL COUNT (BEAKER) 3.40 M/ L    4.63-6.08    L          

  



             (test code = 761)                                        

 

             HEMOGLOBIN (BEAKER) (test code = 11.8 GM/DL   13.7-17.5    L       

     



             410)                                                

 

             HEMATOCRIT (BEAKER) (test code = 34.1 %       40.1-51.0    L       

     



             411)                                                

 

             MEAN CORPUSCULAR VOLUME (BEAKER) 100.3 fL     79.0-92.2    H       

     



             (test code = 753)                                        

 

             MEAN CORPUSCULAR HEMOGLOBIN 34.7 pg      25.7-32.2    H            



             (BEAKER) (test code = 751)                                        

 

             MEAN CORPUSCULAR HEMOGLOBIN CONC 34.6 GM/DL   32.3-36.5            

     



             (BEAKER) (test code = 752)                                        

 

             RED CELL DISTRIBUTION WIDTH 13.4 %       11.6-14.4                 



             (BEAKER) (test code = 412)                                        

 

             PLATELET COUNT (BEAKER) (test 122 K/CU MM  150-450      L          

  



             code = 756)                                         

 

             MEAN PLATELET VOLUME (BEAKER) 11.1 fL      9.4-12.4                

  



             (test code = 754)                                        

 

             NUCLEATED RED BLOOD CELLS 0 /100 WBC   0-0                       



             (BEAKER) (test code = 413)                                        



BASIC METABOLIC [id number] 14:18:00





             Test Item    Value        Reference Range Interpretation Comments

 

             SODIUM (BEAKER) 139 meq/L    136-145                   



             (test code = 381)                                        

 

             POTASSIUM (BEAKER) 4.0 meq/L    3.5-5.1                   



             (test code = 379)                                        

 

             CHLORIDE (BEAKER) 110 meq/L           H            



             (test code = 382)                                        

 

             CO2 (BEAKER) (test 22 meq/L     22-29                     



             code = 355)                                         

 

             BLOOD UREA NITROGEN 9 mg/dL      7-21                      



             (BEAKER) (test code                                        



             = 354)                                              

 

             CREATININE (BEAKER) 0.80 mg/dL   0.57-1.25                 



             (test code = 358)                                        

 

             GLUCOSE RANDOM 111 mg/dL           H            



             (BEAKER) (test code                                        



             = 652)                                              

 

             CALCIUM (BEAKER) 8.1 mg/dL    8.4-10.2     L            



             (test code = 697)                                        

 

             EGFR (BEAKER) (test 93 mL/min/1.73                           ESTIMA

ROSALBA GFR IS



             code = 1092) sq m                                   NOT AS ACCURATE

 AS



                                                                 CREATININE



                                                                 CLEARANCE IN



                                                                 PREDICTING



                                                                 GLOMERULAR



                                                                 FILTRATION RATE

.



                                                                 ESTIMATED GFR I

S



                                                                 NOT APPLICABLE 

FOR



                                                                 DIALYSIS PATIEN

TS.



 ID - CANDI MCBC (HEMOGRAM ONLY)2020 14:04:00





             Test Item    Value        Reference Range Interpretation Comments

 

             WHITE BLOOD CELL COUNT (BEAKER) 10.6 K/ L    3.5-10.5     H        

    



             (test code = 775)                                        

 

             RED BLOOD CELL COUNT (BEAKER) 3.54 M/ L    4.63-6.08    L          

  



             (test code = 761)                                        

 

             HEMOGLOBIN (BEAKER) (test code = 12.2 GM/DL   13.7-17.5    L       

     



             410)                                                

 

             HEMATOCRIT (BEAKER) (test code = 35.8 %       40.1-51.0    L       

     



             411)                                                

 

             MEAN CORPUSCULAR VOLUME (BEAKER) 101.1 fL     79.0-92.2    H       

     



             (test code = 753)                                        

 

             MEAN CORPUSCULAR HEMOGLOBIN 34.5 pg      25.7-32.2    H            



             (BEAKER) (test code = 751)                                        

 

             MEAN CORPUSCULAR HEMOGLOBIN CONC 34.1 GM/DL   32.3-36.5            

     



             (BEAKER) (test code = 752)                                        

 

             RED CELL DISTRIBUTION WIDTH 13.2 %       11.6-14.4                 



             (BEAKER) (test code = 412)                                        

 

             PLATELET COUNT (BEAKER) (test 127 K/CU MM  150-450      L          

  



             code = 756)                                         

 

             MEAN PLATELET VOLUME (BEAKER) 10.5 fL      9.4-12.4                

  



             (test code = 754)                                        

 

             NUCLEATED RED BLOOD CELLS 0 /100 WBC   0-0                       



             (BEAKER) (test code = 413)                                        



ANESTHESIA CSE [id number] 09:05:25Lakhwinder Irving MD - 2020  9:05 AM
CSTCSE BlockPatient location during procedure: ORStart time: 2020 8:46 
AMEnd time: 2020 8:50 AM Procedure Indication: procedure for pain, at 
surgeon's request and post-op pain managementStaffingAnesthesiologist: Lakhwinder Irving MDPerformed: personally Preanesthetic ChecklistCompleted: patient 
identified, pre-op evaluation, timeout performed, IV checked, risks and benefits
discussed, monitors and equipment checked, anesthesia consent given, prep site 
dry prior to draping and maximum sterile barriers were used: cap, mask, sterile 
gown, sterile gloves, and large sterile sheetPrepPrep: BetadineProcedures: 
sterile gloves, surgical mask, surgical hat,sterile technique and prep and 
sterile drape appliedCSE BlockPatient position: sittingPatient monitoring: EKG, 
HR, BP and HzP6Sovqdyde: midlinelandmark technique and landmark 
techniqueInjection technique: GENA airNo pictures availableRegion: lumbarLevel:  
3-4Spinal NeedleNeedle type: PencanNeedle gauge: 25 GNeedle length: 12 
cmIntroducer usedEpidural NeedleNeedle type: Tuohy Needle gauge: 17 GNeedle le
ngth: 9 cmCatheterCatheter type: side holeCatheter size: 19 GAssessmentinjection
not painful, no injection resistance, no paresthesia, no cerebrospinal fluid, no
intravascular injection, no epidural blood return, no intrathecal medication 
injection and no barbotagepatient tolerated the procedure well,patient had no 
immediate complications and patient had adequate level of anesthesia and 
negative Allis clamp testAdditional NotesPatient tolerated well.  No pain on 
injection or throughout procedure.[facility] CenterABORH, manual
2020 07:04:00





             Test Item    Value        Reference Range Interpretation Comments

 

             ABO Grouping (test code = 2588) O                                  

    

 

             Rh Factor (test code = 2589) POS                                   

 



[facility] CenterType and screen, [id number] 06:43:00





             Test Item    Value        Reference Range Interpretation Comments

 

             ABO/RH AUTOMATED (BEAKER) (test O POSITIVE                         

    



             code = 2260)                                        

 

             Ab Scrn (test code = 890-4) NEGATIVE                               



[facility] CenterBASIC METABOLIC [id number] 06:33:00





             Test Item    Value        Reference Range Interpretation Comments

 

             SODIUM (BEAKER) 139 meq/L    136-145                   



             (test code = 381)                                        

 

             POTASSIUM (BEAKER) 4.1 meq/L    3.5-5.1                   



             (test code = 379)                                        

 

             CHLORIDE (BEAKER) 107 meq/L                        



             (test code = 382)                                        

 

             CO2 (BEAKER) (test 21 meq/L     22-29        L            



             code = 355)                                         

 

             BLOOD UREA NITROGEN 10 mg/dL     7-21                      



             (BEAKER) (test code                                        



             = 354)                                              

 

             CREATININE (BEAKER) 0.87 mg/dL   0.57-1.25                 



             (test code = 358)                                        

 

             GLUCOSE RANDOM 109 mg/dL           H            



             (BEAKER) (test code                                        



             = 652)                                              

 

             CALCIUM (BEAKER) 9.5 mg/dL    8.4-10.2                  



             (test code = 697)                                        

 

             EGFR (BEAKER) (test 85 mL/min/1.73                           ESTIMA

RSOALBA GFR IS



             code = 1092) sq m                                   NOT AS ACCURATE

 AS



                                                                 CREATININE



                                                                 CLEARANCE IN



                                                                 PREDICTING



                                                                 GLOMERULAR



                                                                 FILTRATION RATE

.



                                                                 ESTIMATED GFR I

S



                                                                 NOT APPLICABLE 

FOR



                                                                 DIALYSIS PATIEN

TS.



 ID - OTILIA MPOC-Hemoglobin [id number] 06:04:00





             Test Item    Value        Reference Range Interpretation Comments

 

             POC-Hemoglobin Meter 13.9 g/dL    13-16.8                   TESTED 

AT [facility]



             (test code = 1539)                                        [address]

[place]



                                                                 [facility] [address]

0

 

             Lab Interpretation (test Normal                                 



             code = 87704-6)                                        



[facility] [id number] 06:04:00





             Test Item    Value        Reference Range Interpretation Comments

 

             POC-HEMOGLOBIN METER 13.9 g/dL    13.0-16.8                 TESTED 

AT [facility] [address]



             ([facility]) (test code =                                        [place]

[facility]



             1539)                                               [address]



TISSUE [id number] 16:32:00Surgical Pathology Report                         
Case: [id number]                                 Authorizing Provider:  Marcus Bazzi MD       Collected:           2020 1550            Ordering 
Location:     [facility] PERIOPERATIVE   Received:            2020 0931   
          SERVICES                                                              
    Pathologist:           Ricky Dasilva MD                   
                                                     Specimen:    Other, LEFT 
DIVERTICULAR TUMOR RE-RESECTION                                          A. 
URINARY BLADDER, LEFT DIVERTICULAR TUMOR RE-RESECTION, TURBT:    - UROTHELIAL 
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
PROPRIA IS NOT PRESENT         Signing Pathologist Direct Phone Line: 
[phone] signed by Ricky Dasilva MD on 2020 at
 4:32 PMEssentially 100% of the tumor is invasive.A. 77030.Preop diagnosis:  
Bladder tumor, incomplete bladder emptying A. Other Received in formalin labeled
with the patient's name, accession number and "left diverticular tumor re-
resection" is a 1.6 x 1.5 x 0.3 cm aggregate of tan-pink rubbery tissue which is
filtered and submitted in toto in A1.  PA/pl A, Performed.[id number]
11:17:00





             Test Item    Value        Reference Range Interpretation Comments

 

             SODIUM (BEAKER) (test 141 meq/L    136-145                   



             code = 381)                                         

 

             POTASSIUM (BEAKER) 4.0 meq/L    3.5-5.1                   Specimen 

slightly



             (test code = 379)                                        hemolyzed

 

             CHLORIDE (BEAKER) 109 meq/L           H            



             (test code = 382)                                        

 

             CO2 (BEAKER) (test 26 meq/L     22-29                     



             code = 355)                                         



 ID - LABUN AND [id number] 11:17:00





             Test Item    Value        Reference Range Interpretation Comments

 

             BLOOD UREA NITROGEN 10 mg/dL     7-21                      



             (BEAKER) (test code                                        



             = 354)                                              

 

             CREATININE (BEAKER) 0.82 mg/dL   0.57-1.25                 Specimen

 slightly



             (test code = 358)                                        hemolyzed

 

             EGFR (BEAKER) (test 91 mL/min/1.73                           ESTIMA

ROSALBA GFR IS



             code = 1092) sq m                                   NOT AS ACCURATE

 AS



                                                                 CREATININE



                                                                 CLEARANCE IN



                                                                 PREDICTING



                                                                 GLOMERULAR



                                                                 FILTRATION RATE

.



                                                                 ESTIMATED GFR I

S



                                                                 NOT APPLICABLE 

FOR



                                                                 DIALYSIS PATIEN

TS.



 ID - LAPOCT-GLUCOSE [id number] 11:07:00





             Test Item    Value        Reference Range Interpretation Comments

 

             POC-GLUCOSE METER 93 mg/dL                         : TESTED A

T [facility] [address]



             (BEAKER) (test code =                                        [place],



             [address])                                               [address]:



                                                                 /Techni

albert ID =



                                                                 [id number] for TERESSA SUTHERLAND



HEMOGLOBIN AND [id number] 11:04:00





             Test Item    Value        Reference Range Interpretation Comments

 

             HEMOGLOBIN (BEAKER) (test code = 14.9 GM/DL   13.7-17.5            

     



             410)                                                

 

             HEMATOCRIT (BEAKER) (test code = 42.8 %       40.1-51.0            

     



             411)                                                



 ID - [id number]

## 2021-02-24 NOTE — EKG
Test Date:    2021-02-22               Test Time:    10:33:55

Technician:   HE                                    

                                                     

MEASUREMENT RESULTS:                                       

Intervals:                                           

Rate:         75                                     

VA:           152                                    

QRSD:         80                                     

QT:           362                                    

QTc:          404                                    

Axis:                                                

P:            79                                     

VA:           152                                    

QRS:          60                                     

T:            90                                     

                                                     

INTERPRETIVE STATEMENTS:                                       

                                                     

Sinus rhythm with occasional premature ventricular complexes

Septal infarct, age undetermined

T wave abnormality, consider anterior ischemia

Abnormal ECG

Compared to ECG 02/10/2005 07:15:00

Ventricular premature complex(es) now present

Sinus bradycardia no longer present

Myocardial infarct finding still present

T-wave abnormality still present

Possible ischemia still present



Electronically Signed On 02-23-21 23:58:54 CST by Ariel Mai

## 2021-02-24 NOTE — EKG
Test Date:    2021-02-22               Test Time:    10:34:24

Technician:   HE                                    

                                                     

MEASUREMENT RESULTS:                                       

Intervals:                                           

Rate:         77                                     

IL:           146                                    

QRSD:         94                                     

QT:           370                                    

QTc:          418                                    

Axis:                                                

P:            71                                     

IL:           146                                    

QRS:          61                                     

T:            101                                    

                                                     

INTERPRETIVE STATEMENTS:                                       

                                                     

Normal sinus rhythm with sinus arrhythmia

Septal infarct, age undetermined

T wave abnormality, consider anterior ischemia

Abnormal ECG

Compared to ECG 02/22/2021 10:33:55

Ventricular premature complex(es) no longer present

Myocardial infarct finding still present

T-wave abnormality still present

Possible ischemia still present



Electronically Signed On 02-23-21 23:58:52 CST by Ariel Mai

## 2021-03-13 ENCOUNTER — HOSPITAL ENCOUNTER (EMERGENCY)
Dept: HOSPITAL 97 - ER | Age: 81
Discharge: HOME | End: 2021-03-13
Payer: COMMERCIAL

## 2021-03-13 VITALS — DIASTOLIC BLOOD PRESSURE: 74 MMHG | SYSTOLIC BLOOD PRESSURE: 116 MMHG

## 2021-03-13 VITALS — TEMPERATURE: 98.4 F

## 2021-03-13 VITALS — OXYGEN SATURATION: 99 %

## 2021-03-13 DIAGNOSIS — F17.210: ICD-10-CM

## 2021-03-13 DIAGNOSIS — I10: ICD-10-CM

## 2021-03-13 DIAGNOSIS — R31.9: Primary | ICD-10-CM

## 2021-03-13 DIAGNOSIS — Z95.818: ICD-10-CM

## 2021-03-13 DIAGNOSIS — Z85.51: ICD-10-CM

## 2021-03-13 LAB
ALBUMIN SERPL BCP-MCNC: 2.8 G/DL (ref 3.4–5)
ALP SERPL-CCNC: 67 U/L (ref 45–117)
ALT SERPL W P-5'-P-CCNC: 49 U/L (ref 12–78)
AST SERPL W P-5'-P-CCNC: 31 U/L (ref 15–37)
BUN BLD-MCNC: 16 MG/DL (ref 7–18)
GLUCOSE SERPLBLD-MCNC: 141 MG/DL (ref 74–106)
HCT VFR BLD CALC: 28.5 % (ref 39.6–49)
LIPASE SERPL-CCNC: 122 U/L (ref 73–393)
LYMPHOCYTES # SPEC AUTO: 0.6 K/UL (ref 0.7–4.9)
PMV BLD: 8.3 FL (ref 7.6–11.3)
POTASSIUM SERPL-SCNC: 3.2 MMOL/L (ref 3.5–5.1)
RBC # BLD: 3.02 M/UL (ref 4.33–5.43)

## 2021-03-13 PROCEDURE — 96375 TX/PRO/DX INJ NEW DRUG ADDON: CPT

## 2021-03-13 PROCEDURE — 85025 COMPLETE CBC W/AUTO DIFF WBC: CPT

## 2021-03-13 PROCEDURE — 87086 URINE CULTURE/COLONY COUNT: CPT

## 2021-03-13 PROCEDURE — 99284 EMERGENCY DEPT VISIT MOD MDM: CPT

## 2021-03-13 PROCEDURE — 80076 HEPATIC FUNCTION PANEL: CPT

## 2021-03-13 PROCEDURE — 80048 BASIC METABOLIC PNL TOTAL CA: CPT

## 2021-03-13 PROCEDURE — 81003 URINALYSIS AUTO W/O SCOPE: CPT

## 2021-03-13 PROCEDURE — 87088 URINE BACTERIA CULTURE: CPT

## 2021-03-13 PROCEDURE — 81015 MICROSCOPIC EXAM OF URINE: CPT

## 2021-03-13 PROCEDURE — 96374 THER/PROPH/DIAG INJ IV PUSH: CPT

## 2021-03-13 PROCEDURE — 36415 COLL VENOUS BLD VENIPUNCTURE: CPT

## 2021-03-13 PROCEDURE — 83690 ASSAY OF LIPASE: CPT

## 2021-03-13 NOTE — EDPHYS
Physician Documentation                                                                           

 [facility]                                                                 

Name: Beverly Santacruz                                                                                 

Age: 81 yrs                                                                                       

Sex: Male                                                                                         

: 1940                                                                                   

MRN: [id number]                                                                                   

Arrival Date: 2021                                                                          

Time: 09:10                                                                                       

Account#: [id number]                                                                            

Bed 19                                                                                            

Private MD:                                                                                       

ED Physician Rudy Machado                                                                     

HPI:                                                                                              

                                                                                             

15:50 This 81 yrs old  Male presents to ER via Ambulatory with complaints of         tw4 

      Weakness, Blood in Urine.                                                                   

15:51 The patient presents with urinary symptoms, hematuria. Onset: The symptoms/episode      tw4 

      began/occurred today. Modifying factors: The symptoms are alleviated by nothing, the        

      symptoms are aggravated by nothing. Associated signs and symptoms: The patient has no       

      apparent associated signs or symptoms. Severity of symptoms: At their worst the             

      symptoms were moderate, in the emergency department the symptoms are unchanged. The         

      patient has not experienced similar symptoms in the past.                                   

15:53 Pt states he has a history of bladder cancer and had a procedure performed to cauterize tw4 

      bleeding. Pt states that he is having increased bleeding today.                             

                                                                                                  

Historical:                                                                                       

- Allergies:                                                                                      

10:00 No Known Allergies;                                                                     ll1 

- PMHx:                                                                                           

10:00 Gastric Reflux; Myocardial infarction; Bladder cancer; Hypertension;                    ll1 

- PSHx:                                                                                           

10:00 facial; Knee surgery; back; Heart stents;                                               ll1 

                                                                                                  

- Immunization history:: Client reports receiving the 2nd dose of the Covid vaccine,              

  Flu vaccine is up to date.                                                                      

- Social history:: Smoking status: Patient reports the use of cigarette tobacco                   

  products, denies chronic smoking, but will smoke occasionally, trying to quit, uses             

  nicorette..                                                                                     

                                                                                                  

                                                                                                  

ROS:                                                                                              

15:51 Constitutional: Negative for fever, chills, and weight loss, Eyes: Negative for injury, tw4 

      pain, redness, and discharge, Cardiovascular: Negative for chest pain, palpitations,        

      and edema, Respiratory: Negative for shortness of breath, cough, wheezing, and              

      pleuritic chest pain, Abdomen/GI: Negative for abdominal pain, nausea, vomiting,            

      diarrhea, and constipation, Back: Negative for injury and pain, MS/Extremity: Negative      

      for injury and deformity, Skin: Negative for injury, rash, and discoloration, Neuro:        

      Negative for headache, weakness, numbness, tingling, and seizure.                           

15:51 : Positive for hematuria, Negative for injury or acute deformity, urinary symptoms,       

      small amounts.                                                                              

                                                                                                  

Exam:                                                                                             

15:53 Constitutional:  This is a well developed, well nourished patient who is awake, alert,  tw4 

      and in no acute distress. Head/Face:  Normocephalic, atraumatic. Chest/axilla:  Normal      

      chest wall appearance and motion.  Nontender with no deformity.  No lesions are             

      appreciated. Cardiovascular:  Regular rate and rhythm with a normal S1 and S2.  No          

      gallops, murmurs, or rubs.  Normal PMI, no JVD.  No pulse deficits. Respiratory:  Lungs     

      have equal breath sounds bilaterally, clear to auscultation and percussion.  No rales,      

      rhonchi or wheezes noted.  No increased work of breathing, no retractions or nasal          

      flaring. Abdomen/GI:  Soft, non-tender, with normal bowel sounds.  No distension or         

      tympany.  No guarding or rebound.  No evidence of tenderness throughout. Back:  No          

      spinal tenderness.  No costovertebral tenderness.  Full range of motion. MS/ Extremity:     

       Pulses equal, no cyanosis.  Neurovascular intact.  Full, normal range of motion.           

      Neuro:  Awake and alert, GCS 15, oriented to person, place, time, and situation.            

      Cranial nerves II-XII grossly intact.  Motor strength 5/5 in all extremities.  Sensory      

      grossly intact.  Cerebellar exam normal.  Normal gait.                                      

                                                                                                  

Vital Signs:                                                                                      

10:01  / 62; Pulse 89; Resp 17; Temp 98.4; Pulse Ox 99% ; Weight 66.68 kg; Height 5 ft. ll1 

      8 in. (172.72 cm); Pain 0/10;                                                               

10:03  / 59; Pulse 79; Resp 18; Pulse Ox 99% ; Pain 6/10;                               bw  

11:08  / 62; Pulse 70; Resp 20; Pulse Ox 98% ;                                          bw  

12:03  / 64; Pulse 77; Resp 18; Pulse Ox 99% on R/A;                                    bw  

13:47  / 74; Pulse 71; Resp 20; Pulse Ox 99% ;                                          bw  

10:01 Body Mass Index 22.35 (66.68 kg, 172.72 cm)                                             ll1 

                                                                                                  

MDM:                                                                                              

09:59 Patient medically screened.                                                             tw4 

15:53 Differential diagnosis: nonspecific abdominal pain, UTI. Data reviewed: vital signs,    tw4 

      nurses notes. Data interpreted: Pulse oximetry: Interpretation: normal. Counseling: I       

      had a detailed discussion with the patient and/or guardian regarding: the historical        

      points, exam findings, and any diagnostic results supporting the discharge/admit            

      diagnosis. Special discussion: I discussed with the patient/guardian in detail that at      

      this point there is no indication for admission to the hospital. It is understood,          

      however, that if the symptoms persist or worsen the patient needs to return immediately     

      for re-evaluation. ED course: Irrigated pt bladder with 500cc urine and cleared clot        

      from the bladder with return of clear urine. Pt has slight blood tinged urine however       

      was able to void without difficulty. D/W pts urologist at [facility] and stated         

      they would see the patient on Monday.                                                       

                                                                                                  

                                                                                             

10:00 Order name: Basic Metabolic Panel; Complete Time: 13:14                                 [facility] 

                                                                                             

13:14 Interpretation: Normal except: K 3.2; ; GLUC 141; GFR 66.                         [facility] 

                                                                                             

10:00 Order name: CBC with Diff; Complete Time: 13:14                                         [facility] 

                                                                                             

13:14 Interpretation: Normal except: RBC 3.02; HGB 9.8; HCT 28.5; ; FERNANDO% 84.1; LYM%    tw4 

      11.0; LYMA 0.6.                                                                             

                                                                                             

10:00 Order name: Hepatic Function; Complete Time: 13:14                                      [facility] 

                                                                                             

13:15 Interpretation: Normal except: ALB 2.8; A/G 0.7; GLOB 4.0.                              [facility] 

                                                                                             

10:00 Order name: Lipase; Complete Time: 13:14                                                [facility] 

                                                                                             

10:00 Order name: Urine Microscopic Only; Complete Time: 13:14                                [facility] 

                                                                                             

11:05 Order name: Urine Culture                                                               [facility]

                                                                                             

10:00 Order name: IV Saline Lock; Complete Time: 10:19                                        [facility] 

                                                                                             

10:00 Order name: Labs collected and sent; Complete Time: 10:19                               [facility] 

                                                                                             

10:00 Order name: Urine Dipstick-Ancillary (obtain specimen); Complete Time: 10:43            [facility] 

                                                                                             

11:26 Order name: Urine Dipstick--Ancillary (enter results); Complete Time: 13:14             mt  

                                                                                             

13:34 Interpretation: Normal except: UESTR 3+; UBLD 3+; UKET 1+; UPROT 3+.                                                                                                                 

11:27 Order name: [place]; Complete Time: 12:40                                       tw4 

                                                                                                  

Administered Medications:                                                                         

12:35 Drug: morphine 4 mg Route: IVP; Site: left antecubital;                                 bw  

14:25 Follow up: Response: No adverse reaction; Pain is decreased                             ss  

12:35 Drug: Zofran (Ondansetron) 4 mg Route: IVP; Site: left antecubital;                     bw  

14:24 Follow up: Response: No adverse reaction                                                ss  

14:24 Not Given (Patient Refused): Ditropan 5 mg PO once                                      ss  

                                                                                                  

                                                                                                  

Disposition:                                                                                      

21 13:44 Discharged to Home. Impression: Hematuria.                                         

- Condition is Stable.                                                                            

- Discharge Instructions: Hematuria, Adult.                                                       

                                                                                                  

- Medication Reconciliation Form, Thank You Letter, Antibiotic Education, Prescription            

  Opioid Use form.                                                                                

- Follow up: Private Physician; When: Upon discharge from the Emergency Department;               

  Reason: Recheck today's complaints, Continuance of care, Re-evaluation by your                  

  physician.                                                                                      

- Problem is new.                                                                                 

- Symptoms have improved.                                                                         

                                                                                                  

                                                                                                  

                                                                                                  

Signatures:                                                                                       

Dispatcher [employer]                           EDMS                                                 

Brittaney Harrison RN                      RN                                                      

Rudy Machado MD MD   tw4                                                  

Andrew Zimmerman RN                       RN   1                                                  

Romina Beltran RN                       RN                                                      

                                                                                                  

Corrections: (The following items were deleted from the chart)                                    

14:31 13:44 2021 13:44 Discharged to Home. Impression: Hematuria. Condition is Stable.  ss  

      Forms are Medication Reconciliation Form, Thank You Letter, Antibiotic Education,           

      Prescription Opioid Use. Follow up: Private Physician; When: Upon discharge from the        

      Emergency Department; Reason: Recheck today's complaints, Continuance of care,              

      Re-evaluation by your physician. Problem is new. Symptoms have improved. tw4                

                                                                                                  

**************************************************************************************************

## 2021-03-13 NOTE — XMS REPORT
Continuity of Care Document

                            Created on:2021



Patient:DELFINO ADLER

Sex:Male

:1940

External Reference #:[id number]





Demographics







                          Address                   [address] 



                                                    [place] [address]

 

                          Home Phone                [phone]

 

                          Mobile Phone              [phone]

 

                          Email Address             [email or web address]

 

                          Preferred Language        English

 

                          Marital Status            Unknown

 

                          Presybeterian Affiliation     Unknown

 

                          Race                      Unknown

 

                          Additional Race(s)        Unavailable



                                                    White

 

                          Ethnic Group              Unknown









Author







                          Organization              [facility]

t

 

                          Address                   [address]



                                                    [place] [address]

 

                          Phone                     [phone]









Support







                Name            Relationship    Address         Phone

 

                Lode            Designated Contact [address]   [phone]



                                                [place] [address] 









Care Team Providers







                    Name                Role                Phone

 

                    Jessica Torres MD  Primary Care Physician [phone]

 

                    Domenica SNIDER           Attending Clinician [phone]

 

                    Sejal SNIDER          Attending Clinician [phone]

 

                    Matt Crandall MD    Attending Clinician [phone]

 

                    Vane Carmona MD  Attending Clinician [phone]

 

                    Jluis SNIDER            Attending Clinician [phone]

 

                    Matt Morel MD   Attending Clinician [phone]

 

                    Nicole Gee CRNA  Attending Clinician [phone]

 

                    DOMENICA              Attending Clinician Unavailable

 

                    Jim Valdez MD     Attending Clinician [phone]

 

                    Jluis SNIDER            Attending Clinician [phone]

 

                    JOSE                Attending Clinician Unavailable

 

                    JLUIS               Attending Clinician Unavailable

 

                    SEJAL             Admitting Clinician Unavailable

 

                    JOSE                Admitting Clinician Unavailable

 

                    JLUIS               Admitting Clinician Unavailable









Payers







           Payer Name Policy Type Policy     Effective Date Expiration Date Sour

ce



                                 Number                           

 

           MEDICAREMEDICARE A            [id number] 2007            JDAIEL Saavedra



           [id number]                       00:00:00              - 

Medical



           resentMedicare                                             Center

 

           AETNA - MGD CAREAETNA            [id number]  2001            JADIEL Brown



           INDEMNITY NON                       00:00:00              - Medical



           [id number]                                             Ce

nter



           -PresentComm                                             







Problems







       Condition Condition Condition Status Onset  Resolution Last   Treating Co

mments 

Source



       Name   Details Category        Date   Date   Treatment Clinician        



                                                 Date                 

 

       Bladder Bladder Disease Active                              [facility]



       cancer cancer               2-                               [facility]



                                   00:00:                             Medical



                                   00                                 [place]

 

       Bladder Bladder Disease Active                              [facility]



       diverticul diverticul               1-                               [facility]

[facility] -



       um     um                   00:00:                             Medical



                                   00                                 [place]

 

       Bladder Bladder Disease Active                              [facility]



       tumor  tumor                                               [facility]



                                   00:00:                             Medical



                                   00                                 [place]







Allergies, Adverse Reactions, Alerts

This patient has no known allergies or adverse reactions.



Social History







           Social Habit Start Date Stop Date  Quantity   Comments   Source

 

           History of tobacco                       Current smoker            

[facility]



           use                                                    [facility]

 

           Sex Assigned At                                             [facility]

kes 



           Birth                                                  [facility]

 

           Exposure to                       Not sure              [facility] 

-



           SARS-CoV-2 (event)                                             [facility]

[facility]

 

           Cigarettes smoked 2021                       [facility]



           current (pack per 00:00:00   00:00:00                         [facility]



           day) - Reported                                             

 

           Tobacco use and 2021 Never used            [facility]

[facility] -



           exposure   00:00:00   00:00:00                         [facility]

 

           Alcohol intake 2021 Current drinker of            

[facility] -



                      00:00:00   00:00:00   alcohol (finding)            [facility]

 

           Alcohol Comment 2020 occasionally            [facility] 

[facility]



                      00:00:00   00:00:00                         [facility]









                Smoking Status  Start Date      Stop Date       Source

 

                Former smoker   2021 00:00:00 2021 00:00:00 [facility]







Medications







       Ordered Filled Start  Stop   Current Ordering Indication Dosage Frequency

 Signature

                    Comments            Components          Source



     Medication Medication Date Date Medication? Clinician                (SIG) 

          



     Name Name                                                   

 

     tamsulosin            Yes            .8mg QD   Take 0.8           [facility]



     (FLOMAX)      3-09                               mg by           [facility] -



     0.4 mg Cap      16:21:                               mouth           Medica

l



     24 hr      56                                 daily .           [place]



     capsule                                                        

 

     multivitami            Yes            1{capsu QD   Take 1           C

[facility]



     n capsule      3-09                     le}       capsule by           [facility]

s -



               16:21:                               mouth           Medical



               56                                 daily.           [place]

 

     aspirin 81            Yes            81mg QD   Take 81 mg           C

[facility]



     MG EC      3-09                               by mouth           [facility] -



     tablet      16:21:                               daily.           Medical



               56                                                Center

 

     lactulose            Yes            20g  QD   Take 20 g           [facility]



     (CHRONULAC)      3-09                               by mouth           [facility]

s -



     10 gram/15      16:21:                               daily.           Medic

al



     mL (15 mL)      56                                                Center



     solution                                                        

 

     pantoprazol            Yes            40mg QD   Take 40 mg           

[facility]



     e         3-09                               by mouth           [facility] -



     (PROTONIX)      16:21:                               daily.           Medic

al



     40 MG      56                                                Center



     tablet                                                        

 

     atorvastati            Yes            40mg QD   Take 40 mg           

[facility]



     n (LIPITOR)      3-09                               by mouth           [facility]

s -



     40 MG      16:21:                               daily.           Medical



     tablet      56                                                Center

 

     metoprolol            Yes            100mg QD   Take 100           [facility]

[facility]



     (TOPROL-XL)      3-09                               mg by           [facility]



     100 MG 24      16:21:                               mouth           Medical



     hr tablet      56                                 daily.           Center

 

     travoprost            Yes            1[drp] QD   Place 1           [facility]

[facility]



     (TRAVATAN      3-                               drop into           [facility]

 -



     Z) 0.004 %      16:21:                               both eyes           Me

dical



     Drop      56                                 nightly.           Center



     ophthalmic                                                        



     drops                                                        

 

     phenylephri            Yes            1{spray      1 spray by        

   [facility]



     ne        3-09                     }         Each Nare           [facility] -



     (UZAIR-SYNEPH      16:21:                               route           Medic

al



     RINE) 1 %      56                                 every 6           Center



     Spry nasal                                         (six)           



     spray                                         hours as           



                                                  needed.           

 

     clopidogreL            Yes            75mg QD   Take 75 mg           

[facility]



     (PLAVIX) 75      3-09                               by mouth           [facility]

s -



     mg tablet      16:21:                               daily.           Medica

l



               56                                                Center

 

     cefpodoxime      2021- No             100mg Q.5D Take 100           

[facility]



     (VANTIN)      3-09 03-09                          mg by           [facility]



     100 MG      08:34: 00:00                          mouth 2           Medical



     tablet      12   :00                           (two)           Center



                                                  times           



                                                  daily.           

 

     gabapentin      2022- Yes            200mg [id number] Take 2        

   [facility]



     (NEURONTIN)      3- 03-09                     [id number] capsules        

   [facility] -



     100 MG      00:00: 23:59                     3D   (200 mg           Medical



     capsule      00   :00                           total) by           Center



                                                  mouth 3           



                                                  (three)           



                                                  times           



                                                  daily.           

 

     oxybutynin      2022- Yes            15mg QD   Take 1           [facility]



     (DITROPAN      3-09 03-09                          tablet (15           [facility]

es -



     XL) 15 MG      00:00: 23:59                          mg total)           Me

dical



     24 hr      00   :00                           by mouth           Center



     tablet                                         nightly.           

 

     HYDROcodone      2021- Yes            1{tbl}      Take 1           [facility]

[facility]



     -acetaminop      3-09 03-19                          tablet by           [facility] gaffney (NORCO      00:00: 23:59                          mouth           Medic

al



     )      00   :00                           every 6           Center



      mg                                         (six)           



     per tablet                                         hours as           



                                                  needed for           



                                                  up to 10           



                                                  days. Max           



                                                  Daily           



                                                  Amount: 4           



                                                  tablets           

 

     methocarbam      2021- Yes            1000mg Q.25D Take 2           

[facility]



     oL        3-09 03-19                          tablets           [facility] -



     (ROBAXIN)      00:00: 23:59                          (1,000 mg           Me

dical



     500 MG      00   :00                           total) by           Center



     tablet                                         mouth 4           



                                                  (four)           



                                                  times           



                                                  daily for           



                                                  10 days.           

 

     niacin      2021- No             1000mg Q.5D Take 1,000           [facility]

[facility]



     (NIASPAN)      2-26 02-26                          mg by           [facility] -



     1000 MG CR      08:48: 00:00                          mouth 2           Med

ical



     tablet      16   :00                           (two)           Center



                                                  times           



                                                  daily.           

 

     clopidogreL      2021- No             75mg QD   Take 75 mg          

 [facility]



     (PLAVIX) 75      208 02-08                          by mouth           [facility]

es -



     mg tablet      14:30: 14:30                          daily.           Medic

al



               15   :12                                          Center

 

     senna-docus       2020- No             1{tbl} QD   Take 1           C

[facility]



     ate        03-13                          tablet by           [facility] -



     (SENOKOT S)      00:00: 23:59                          mouth           Medi

yaz



     8.6-50 mg      00   :00                           nightly           Center



     per tablet                                         for 14           



                                                  days.           







Vital Signs







             Vital Name   Observation Time Observation Value Comments     Source

 

             Systolic blood 2021 11:33:00 129 mm[Hg]                [facility]

 [facility]

 

             Diastolic blood 2021 11:33:00 58 mm[Hg]                 [facility]

t [facility]

 

             Heart rate   2021 11:33:00 75 /min                   [facility]

[facility]

 

             Body temperature 2021 11:33:00 36.56 Shirley                 [facility]

 

             Respiratory rate 2021 11:33:00 18 /min                   [facility]

 

             Oxygen saturation in 2021 11:33:00 99 /min                   

[facility] -



             Arterial blood by                                        Medical Ce

nter



             Pulse oximetry                                        

 

             Body height  2021 14:56:00 172.7 cm                  [facility]

 

             Body weight  2021 14:56:00 71.668 kg                 [facility]

 

             BMI          2021 14:56:00 24.02 kg/m2               [facility]







Procedures







                Procedure       Date / Time     Performing Clinician Source



                                Performed                       

 

                CYSTOSCOPY,FULGARATION OF 2021 16:00:00 Marcus Bazzi

[facility]

 

                XR ABDOMEN / KUB 1 VIEW 2021 10:33:00 Kristine [facility]

 

                CBC W/PLT COUNT & AUTO 2021 08:39:00 Meaghan Carmona

[facility]

 

                COMPREHENSIVE METABOLIC 2021 08:39:00 Kristine [facility]

 

                COMPREHENSIVE METABOLIC 2021 15:15:00 Kristine [facility]

 

                MAGNESIUM       2021 15:15:00 Kristine [facility]

 

                HEPATITIS B SURFACE 2021 15:15:00 Yobany Robertson      [facility]

[facility] -



                ANTIGEN                                         [facility]

 

                HEPATITIS B CORE 2021 15:15:00 Yobany Robertson      [facility]

s -



                ANTIBODY, TOTAL                                 [facility]

 

                HEPATITIS B SURFACE 2021 15:15:00 Yobany Robertson      [facility]

[facility]



                ANTIBODY                                        [facility]

 

                SARS-COV2/RT-PCR ([facility] & 2021 12:48:00 Gwyn Batista [facility] -



                REF LABS)                                       [facility]

 

                IR PORT-A-CATH PLACEMENT 2021 10:45:00 Kristine [facility]

 

                CBC W/PLT COUNT & AUTO 2021 03:54:00 Meaghan Carmona

[facility]

 

                PROTHROMBIN TIME/INR 2021 03:54:00 Meaghan Carmona [facility]

 

                ECG 12-LEAD     2021 16:02:24 Andree Shannon [facility]

 

                2D ECHO W/ DOPPLER 2021 12:37:56 Andree Shannon [facility]



                (CW/PW/COLOR)                   [facility]

 

                CBC W/PLT COUNT & AUTO 2021 04:45:00 [facility]

 

                BASIC METABOLIC PANEL (7) 2021 04:45:00 [facility]

 

                PROTHROMBIN TIME/INR 2021 04:45:00 [facility]

 

                HEPATIC FUNCTION PANEL 2021 04:45:00 [facility]

 

                MAGNESIUM       2021 04:45:00 [facility]

 

                URINALYSIS W/ REFLEX 2021 10:17:00 Domenica [facility]



                URINE CULTURE                                   [facility]

 

                TYPE AND SCREEN, 2021 08:59:00 Domenica [facility] -



                AUTOMATED                                       [facility]

 

                CBC W/PLT COUNT & AUTO 2021 08:59:00 Gricelda Alvares    [facility]

 

                COMPREHENSIVE METABOLIC 2021 08:59:00 Domenica [facility]

 

                PT/APTT         2021 08:59:00 Alvares [facility]

 

                SARS-COV2/RT-PCR ([facility] & 2021 08:58:00 Gwyn Batista [facility] -



                REF LABS)                                       [facility]

 

                ARRYTHMIA IMPLANT REPORT 2021 00:00:00 Provider, Default BEBO

[facility] -



                - SCAN                          Scanning        Medical Center







Plan of Care







             Planned Activity Planned Date Details      Comments     Source

 

             Future Scheduled 2021   DEPRESSION SCREENING              [facility]



             Test         00:00:00     (12+) [code =              Medical Center



                                       DEPRESSION SCREENING              



                                       (12+)]                    

 

             Future Scheduled 2008   MEDICARE ANNUAL              [facility]

[facility] -



             Test         00:00:00     WELLNESS (YEAR 2 or              Medical 

Center



                                       FIRST YEAR if no              



                                       IPPE) [code =              



                                       MEDICARE ANNUAL              



                                       WELLNESS (YEAR 2 or              



                                       FIRST YEAR if no              



                                       IPPE)]                    

 

             Future Scheduled 1992   SHINGLES VACCINES (1              [facility]



             Test         00:00:00     of 2) [code =              Medical Center



                                       SHINGLES VACCINES (1              



                                       of 2)]                    

 

             Future Scheduled 1961   DTAP/TDAP/TD VACCINES              [facility]

[facility]



             Test         00:00:00     (1 - Tdap) [code =              Medical C

enter



                                       DTAP/TDAP/TD VACCINES              



                                       (1 - Tdap)]               

 

             Future Scheduled 1960   HEPATITIS C SCREENING              [facility]

[facility] -



             Test         00:00:00     [code = HEPATITIS C              Medical 

Center



                                       SCREENING]                







Encounters







        Start   End     Encounter Admission Attending Care    Care    Encounter 

Source



        Date/Time Date/Time Type    Type    Clinicians Facility Department ID   

   

 

        2021 Office          Sandoval Valdez [facility]   [id number]

[id number] 



        15:02:50 15:41:07 Visit           Jim Melgoza  [id number]         



                                                        [id number]         



                                                        [phone]         



                                                        530             

 

        2021 Office          YO Bazzi     [id number]

[id number] 



        08:20:10 08:35:10 Visit           Marcus AMBULATOR [id number]       

  



                                                Y       [id number]         



                                                        [phone]         



                                                        300             

 

        2020-11-10 2020-11-10 Outpatient         [facility]     [id number]

[address]



        00:00:00 00:00:00                 VENANCIO                  [address]     [facility]

i



                                                                        st

 

        2020-10-07 2020-10-07 Outpatient         [facility]     021     [id number]

[address]



        00:00:00 00:00:00                 VENANCIO                  [address]     [facility]

i



                                                                        st

 

        2020 Outpatient         [facility]     [id number]

[address]



        00:00:00 00:00:00                 VENANCIO                  805     Method

i



                                                                        st

 

        2020 Outpatient         JOSE   [facility]     [id number]

[address]



        00:00:00 00:00:00                 VENANCIO                  584     Method

i



                                                                        st

 

        2020 Outpatient         JOSE   [facility]     [id number]

[address]



        00:00:00 00:00:00                 VENANCIO                  855     Method

i



                                                                        st







Results







           Test Description Test Time  Test Comments Results    Result     Sourc

e



                                                       Comments   

 

           ARRYTHMIA IMPLANT 2021-03-10            Ordered by an            JADIEL Brown



           REPORT - SCAN 12:21:16              unspecified            - Medical



                                            provider.             Center

 

           RAD, ABDOMEN/KUB, 2021 Reason for *******************          

  



           1 VIEW AP  10:54:00   exam:->contipa *******************            



                                 tionShould *******************            



                                 this be    ***JADIEL BROWN -            



                                 performed at [facility]            



                                 DELFINO Kern            



                                 bedside?->Yes [facility]        :            



                                            1940            



                                            Sex:                  



                                            M******************            



                                            *******************            



                                            *******************            



                                            ****FINAL REPORT            



                                            PATIENT ID:            



                                            [id number] TECHNIQUE:            



                                            Two frontal images            



                                            of the abdomen.            



                                            HISTORY:              



                                            contipation.            



                                            COMPARISON: None.            



                                            IMPRESSION:Stool            



                                            throughout the            



                                            colon.Nonobstructiv            



                                            e radiographic            



                                            bowel gas pattern            



                                            within the imaged            



                                            abdomen.No acute            



                                            bony abnormality.No            



                                            free air within the            



                                            imaged abdomen.            



                                            Signed: Parth Brown MDReport            



                                            Verified Date/Time:            



                                             2021            



                                            10:54:20 Reading            



                                            Location: [address] Consult            



                                            Reading Room            



                                            Electronically            



                                            signed by: PARTH BROWN DO on 2021            



                                            10:54 AM              

 

           XR abdomen / KUB 2021            Interface, External           

 JADIEL Brown



           1 view     10:54:00              Ris In - 2021            - Med

ical



                                            11:01 AM CSTFINAL            Center



                                            REPORT PATIENT ID:            



                                             [id number]             



                                            TECHNIQUE: Two            



                                            frontal images of            



                                            the abdomen.            



                                            HISTORY:              



                                            contipation.            



                                            COMPARISON: None.            



                                            IMPRESSION:Stool            



                                            throughout the            



                                            colon.Nonobstructiv            



                                            e radiographic            



                                            bowel gas pattern            



                                            within the imaged            



                                            abdomen.No acute            



                                            bony abnormality.No            



                                            free air within the            



                                            imaged abdomen.            



                                            Signed: Parth Brown MDReport            



                                            Verified Date/Time:            



                                             2021            



                                            10:54:20 Reading            



                                            Location: [address] Consult            



                                            Reading Room            



                                            Electronically            



                                            signed by: PARTH BROWN DO on 2021            



                                            10:54 AM              









                    Comprehensive metabolic panel 2021 09:29:00 









                      Test Item  Value      Reference Range Interpretation Comme

nts









             Protein, Total (test 6.1          See_Comment                [Autom

ated message] The



             code = 2885-2)                                        system which 

generated



                                                                 this result tra

nsmitted



                                                                 reference range

: 6.0 -



                                                                 8.3 gm/dL. The 

reference



                                                                 range was not u

sed to



                                                                 interpret this 

result as



                                                                 normal/abnormal

.

 

             Albumin (test code = 3.2 g/dL     3.5-5        L            



             26593-2)                                            

 

             Alkaline Phosphatase 54 U/L                           



             (test code = 6768-6)                                        

 

             Total Bilirubin (test 0.2 mg/dL    0.2-1.2                   



             code = 1975-2)                                        

 

             Sodium (test code = 136 meq/L    136-145                   



             2951-2)                                             

 

             Potassium (test code = 4.0 meq/L    3.5-5.1                   



             2823-3)                                             

 

             Chloride (test code = 106 meq/L                        



             2075-0)                                             

 

             CO2 (test code = 8-9) 24 meq/L     22-29                     

 

             BUN (test code = 3094-0) 13 mg/dL     7-21                      

 

             Creatinine (test code = 0.78 mg/dL   0.57-1.25                 



             2160-0)                                             

 

             Glucose (test code = 216 mg/dL           H            



             2345-7)                                             

 

             Calcium (test code = 8.3 mg/dL    8.4-10.2     L            



             45202-1)                                            

 

             AST (test code = 1920-8) 23 U/L       5-34                      

 

             ALT (test code = 1742-6) 24 U/L       6-55                      

 

             EGFR (test code = 96           mL/min/1.73 sq m              ESTIMA

MAGDA GFR IS NOT AS



             84866-9)                                            ACCURATE AS CRE

ATININE



                                                                 CLEARANCE IN IL

EDICTING



                                                                 GLOMERULAR FILT

RATION



                                                                 RATE. ESTIMATED

 GFR IS



                                                                 NOT APPLICABLE 

FOR



                                                                 DIALYSIS PATIEN

TS.

 

             PHILIPP (test code = PHILIPP)  ID -                           



                          OTILIA CHAUDHARY                                 

 

             Lab Interpretation (test Abnormal                               



             code = 90977-7)                                        



[id number] [facility]29:00





             Test Item    Value        Reference Range Interpretation Comments

 

             TOTAL PROTEIN 6.1 gm/dL    6.0-8.3                   



             (BEAKER) (test code =                                        



             770)                                                

 

             ALBUMIN (BEAKER) 3.2 g/dL     3.5-5.0      L            



             (test code = 1145)                                        

 

             ALKALINE PHOSPHATASE 54 U/L                           



             (BEAKER) (test code =                                        



             346)                                                

 

             BILIRUBIN TOTAL 0.2 mg/dL    0.2-1.2                   



             (BEAKER) (test code =                                        



             377)                                                

 

             SODIUM (BEAKER) (test 136 meq/L    136-145                   



             code = 381)                                         

 

             POTASSIUM (BEAKER) 4.0 meq/L    3.5-5.1                   



             (test code = 379)                                        

 

             CHLORIDE (BEAKER) 106 meq/L                        



             (test code = 382)                                        

 

             CO2 (BEAKER) (test 24 meq/L     22-29                     



             code = 355)                                         

 

             BLOOD UREA NITROGEN 13 mg/dL     7-21                      



             (BEAKER) (test code =                                        



             354)                                                

 

             CREATININE (BEAKER) 0.78 mg/dL   0.57-1.25                 



             (test code = 358)                                        

 

             GLUCOSE RANDOM 216 mg/dL           H            



             (BEAKER) (test code =                                        



             652)                                                

 

             CALCIUM (BEAKER) 8.3 mg/dL    8.4-10.2     L            



             (test code = 697)                                        

 

             AST (SGOT) (BEAKER) 23 U/L       5-34                      



             (test code = 353)                                        

 

             ALT (SGPT) (BEAKER) 24 U/L       6-55                      



             (test code = 347)                                        

 

             EGFR (BEAKER) (test 96 mL/min/1.73                           ESTIMA

MAGDA GFR IS



             code = 1092) sq m                                   NOT AS ACCURATE

 AS



                                                                 CREATININE



                                                                 CLEARANCE IN



                                                                 PREDICTING



                                                                 GLOMERULAR



                                                                 FILTRATION RATE

.



                                                                 ESTIMATED GFR I

S



                                                                 NOT APPLICABLE 

FOR



                                                                 DIALYSIS PATIEN

TS.



 [facility] with platelet count + automated [id number] 09:10:00





             Test Item    Value        Reference Range Interpretation Comments

 

             WBC (test code = 6690-2) 8.9          See_Comment                [A

utomated message]



                                                                 The system [employer]



                                                                 generated this 

result



                                                                 transmitted ref

erence



                                                                 range: 3.5 - 10

.5



                                                                 K/L. The refe

rence



                                                                 range was not u

sed to



                                                                 interpret this 

result



                                                                 as normal/abnor

mal.

 

             RBC (test code = 789-8) 2.89         See_Comment  L             [Au

tomated message]



                                                                 The system [employer]



                                                                 generated this 

result



                                                                 transmitted ref

erence



                                                                 range: 4.63 - 6

.08



                                                                 M/L. The refe

rence



                                                                 range was not u

sed to



                                                                 interpret this 

result



                                                                 as normal/abnor

mal.

 

             MCHC (test code = 786-4) 33.6         See_Comment  L             [A

utomated message]



                                                                 The system [employer]



                                                                 generated this 

result



                                                                 transmitted ref

erence



                                                                 range: 32.3 - 3

6.5



                                                                 GM/DL. The refe

rence



                                                                 range was not u

sed to



                                                                 interpret this 

result



                                                                 as normal/abnor

mal.

 

             Hematocrit (test code = 28.0 %       40.1-51      L            



             4544-3)                                             

 

             MCV (test code = 787-2) 96.9 fL      79-92.2      H            

 

             MCH (test code = 785-6) 32.5 pg      25.7-32.2    H            

 

             RDW (test code = 788-0) 13.3 %       11.6-14.4                 

 

             Platelets (test code = 272          See_Comment                [Aut

omated message]



             777-3)                                              The system [employer]



                                                                 generated this 

result



                                                                 transmitted ref

erence



                                                                 range: 150 - 45

0 K/CU



                                                                 MM. The referen

ce



                                                                 range was not u

sed to



                                                                 interpret this 

result



                                                                 as normal/abnor

mal.

 

             MPV (test code = 10.2 fL      9.4-12.4                  



             08528-1)                                            

 

             nRBC (test code = 413) 0            See_Comment                [Aut

omated message]



                                                                 The system [employer]



                                                                 generated this 

result



                                                                 transmitted ref

erence



                                                                 range: 0 - 0 /1

00



                                                                 WBC. The refere

nce



                                                                 range was not u

sed to



                                                                 interpret this 

result



                                                                 as normal/abnor

mal.

 

             % Neutros (test code = 94 %                                   



             429)                                                

 

             % Lymphs (test code = 4 %                                    



             430)                                                

 

             % Monos (test code = 2 %                                    



             431)                                                

 

             % Eos (test code = 432) 0 %                                    

 

             % Baso (test code = 437) 0 %                                    

 

             # Neutros (test code = 8.38         See_Comment  H             [Aut

omated message]



             670)                                                The system [employer]



                                                                 generated this 

result



                                                                 transmitted ref

erence



                                                                 range: 1.78 - 5

.38



                                                                 K/L. The refe

rence



                                                                 range was not u

sed to



                                                                 interpret this 

result



                                                                 as normal/abnor

mal.

 

             # Lymphs (test code = 0.37         See_Comment  L             [Auto

mated message]



             414)                                                The system [employer]



                                                                 generated this 

result



                                                                 transmitted ref

erence



                                                                 range: 1.32 - 3

.57



                                                                 K/L. The refe

rence



                                                                 range was not u

sed to



                                                                 interpret this 

result



                                                                 as normal/abnor

mal.

 

             # Monos (test code = 0.13         See_Comment  L             [Autom

ated message]



             415)                                                The system [employer]



                                                                 generated this 

result



                                                                 transmitted ref

erence



                                                                 range: 0.30 - 0

.82



                                                                 K/L. The refe

rence



                                                                 range was not u

sed to



                                                                 interpret this 

result



                                                                 as normal/abnor

mal.

 

             # Eos (test code = 416) 0.00         See_Comment  L             [Au

tomated message]



                                                                 The system [employer]



                                                                 generated this 

result



                                                                 transmitted ref

erence



                                                                 range: 0.04 - 0

.54



                                                                 K/L. The refe

rence



                                                                 range was not u

sed to



                                                                 interpret this 

result



                                                                 as normal/abnor

mal.

 

             # Baso (test code = 417) 0.01         See_Comment                [A

utomated message]



                                                                 The system [employer]



                                                                 generated this 

result



                                                                 transmitted ref

erence



                                                                 range: 0.01 - 0

.08



                                                                 K/L. The refe

rence



                                                                 range was not u

sed to



                                                                 interpret this 

result



                                                                 as normal/abnor

mal.

 

             Immature     0 %          0-1                       



             Granulocytes-Relative                                        



             (test code = 2801)                                        

 

             Lab Interpretation (test Abnormal                               



             code = 50289-8)                                        



[facility] W/PLT COUNT &amp; AUTO [id number] 
09:10:00





             Test Item    Value        Reference Range Interpretation Comments

 

             WHITE BLOOD CELL COUNT (BEAKER) 8.9 K/ L     3.5-10.5              

    



             (test code = 775)                                        

 

             RED BLOOD CELL COUNT (BEAKER) 2.89 M/ L    4.63-6.08    L          

  



             (test code = 761)                                        

 

             HEMOGLOBIN (BEAKER) (test code = 9.4 GM/DL    13.7-17.5    L       

     



             410)                                                

 

             HEMATOCRIT (BEAKER) (test code = 28.0 %       40.1-51.0    L       

     



             411)                                                

 

             MEAN CORPUSCULAR VOLUME (BEAKER) 96.9 fL      79.0-92.2    H       

     



             (test code = 753)                                        

 

             MEAN CORPUSCULAR HEMOGLOBIN 32.5 pg      25.7-32.2    H            



             (BEAKER) (test code = 751)                                        

 

             MEAN CORPUSCULAR HEMOGLOBIN CONC 33.6 GM/DL   32.3-36.5            

     



             (BEAKER) (test code = 752)                                        

 

             RED CELL DISTRIBUTION WIDTH 13.3 %       11.6-14.4                 



             (BEAKER) (test code = 412)                                        

 

             PLATELET COUNT (BEAKER) (test 272 K/CU MM  150-450                 

  



             code = 756)                                         

 

             MEAN PLATELET VOLUME (BEAKER) 10.2 fL      9.4-12.4                

  



             (test code = 754)                                        

 

             NUCLEATED RED BLOOD CELLS 0 /100 WBC   0-0                       



             (BEAKER) (test code = 413)                                        

 

             NEUTROPHILS RELATIVE PERCENT 94 %                                  

 



             (BEAKER) (test code = 429)                                        

 

             LYMPHOCYTES RELATIVE PERCENT 4 %                                   

 



             (BEAKER) (test code = 430)                                        

 

             MONOCYTES RELATIVE PERCENT 2 %                                    



             (BEAKER) (test code = 431)                                        

 

             EOSINOPHILS RELATIVE PERCENT 0 %                                   

 



             (BEAKER) (test code = 432)                                        

 

             BASOPHILS RELATIVE PERCENT 0 %                                    



             (BEAKER) (test code = 437)                                        

 

             NEUTROPHILS ABSOLUTE COUNT 8.38 K/ L    1.78-5.38    H            



             (BEAKER) (test code = 670)                                        

 

             LYMPHOCYTES ABSOLUTE COUNT 0.37 K/ L    1.32-3.57    L            



             (BEAKER) (test code = 414)                                        

 

             MONOCYTES ABSOLUTE COUNT (BEAKER) 0.13 K/ L    0.30-0.82    L      

      



             (test code = 415)                                        

 

             EOSINOPHILS ABSOLUTE COUNT 0.00 K/ L    0.04-0.54    L            



             (BEAKER) (test code = 416)                                        

 

             BASOPHILS ABSOLUTE COUNT (BEAKER) 0.01 K/ L    0.01-0.08           

      



             (test code = 417)                                        

 

             IMMATURE GRANULOCYTES-RELATIVE 0 %          0-1                    

   



             PERCENT (BEAKER) (test code =                                      

  



             2801)                                               



SARS-CoV2/RT-PCR (Asymptomatic ONLY)2021 02:49:00





             Test Item    Value        Reference Range Interpretation Comments

 

             SARS-COV2/RT-PCR Negative     Not Detected,              



             (test code =              Negative, See              



             67831-1)                  external report              



                                       for linked test              

 

             SARS-COV-2   [facility]                             



             PERFORMING LAB                                        



             (test code =                                        



             50427-1)                                            

 

             PHILIPP (test code = Negative result for this                          

 



             PHILIPP)         test determines that                           



                          SARS-CoV-2 RNA was not                           



                          present in the specimen                           



                          above the Limit of                           



                          Detection (LOD).                           



                          However, Negative                           



                          results do not preclude                           



                          SARS-CoV-2 infection and                           



                          should not be used as                           



                          the sole basis for                           



                          treatment or patient                           



                          management decisions.                           



                          Negative results must be                           



                          combined with clinical                           



                          observations, patient                           



                          history, and                           



                          epidemiological                           



                          information. A false                           



                          negative result may                           



                          occur if a specimen is                           



                          improperly collected,                           



                          transported or handled.                           



                          A false negative result                           



                          should be considered if                           



                          patient's recent                           



                          exposures or clinical                           



                          presentation indicate                           



                          that COVID-19                           



                          (SARS-CoV-2) is likely                           



                          and diagnostic tests for                           



                          other causes of illness                           



                          are negative.                           



                          Re-testing should be                           



                          considered in cases of                           



                          suspected false                           



                          negatives. The limit of                           



                          detection for this assay                           



                          is 800 copies/mL. This                           



                          SARS CoV-2 test is a                           



                          real-time RT-PCR test                           



                          intended for the                           



                          qualitative detection of                           



                          nucleic acid from                           



                          SARS-CoV-2 in a                           



                          nasopharyngeal swab                           



                          specimen collected from                           



                          individuals suspected of                           



                          COVID-19 by their                           



                          healthcare provider.                           



                          This test has not been                           



                          Food and Drug                           



                          Administration (FDA)                           



                          cleared or approved.                           



                          This is a modified                           



                          version of an approved                           



                          Emergency Use                           



                          Authorization (EUA) and                           



                          is in the process of                           



                          review by the FDA.                           



                          Once authorized by the                           



                          FDA, the issued EUA will                           



                          be effective until the                           



                          declaration that                           



                          circumstances exist                           



                          justifying the                           



                          authorization of the                           



                          emergency use of in                           



                          vitro diagnostic tests                           



                          for detection and/or                           



                          diagnosis of COVID-19 is                           



                          terminated under Section                           



                          564(b)(2) of the Act or                           



                          the EUA is revoked under                           



                          Section 564(g) of the                           



                          Act. Fact Sheet for                           



                          Healthcare                             



                          Providers:https://www.[employer]/sites/default/f                           



                          irene/product/documents/F                           



                          act_Sheet_HC_Providers_L                           



                          qad_IGDF-RvC-2.pdf Fact                           



                          Sheet for Healthcare                           



                          Patients:https://www.[employer]/sites/default/fi                           



                          les/product/documents/Fa                           



                          ct_Sheet_Patients_Lyra_S                           



                          ARS-CoV-2.pdf Performing                           



                          Laboratory:[facility] [place] [address]                           



[facility]2021 
02:49:00





             Test Item    Value        Reference Range Interpretation Comments

 

             SARS-COV2/RT-PCR (test Negative     Not Detected, Negative,        

      



             code = 3532004)              See external report for              



                                       linked test               

 

             SARS-COV-2 PERFORMING LAB [facility]                             



             (test code = 7229749)                                        



Negative result for this test determines that SARS-CoV-2 RNA was not present in 
the specimen above the Limit of Detection (LOD).  However, Negative results do 
not preclude SARS-CoV-2 infection and should not be used as the sole basis for 
treatment or patient management decisions. Negative results mustbe combined with
clinical observations, patient history, and epidemiological information. A false
negative result may occur if a specimen is improperly collected, transported or 
handled.  A false negative result should be considered if patient's recent 
exposures or clinical presentation indicate that COVID-19 (SARS-CoV-2) is likely
and diagnostic tests for other causes of illness are negative.  Re-testing 
should be considered in cases of suspected false negatives.The limit of 
detection for this assay is 800 copies/mL.This SARS CoV-2 test is a real-time 
RT-PCR test intended for the qualitative detection of nucleic acid from 
SARS-CoV-2 in a nasopharyngeal swab specimen collected from individuals susp
ected of COVID-19 by their healthcare provider.This test has not been Food and 
Drug Administration (FDA) cleared or approved.  This is a modified version of an
approved Emergency Use Authorization (EUA) and is in the process of review by 
the FDA.   Once authorized by the FDA, the issued EUA will be effective until 
the declaration that circumstances exist justifying the authorization of the 
emergency use of in vitro diagnostic tests for detection and/or diagnosis of 
COVID-19 is terminated under Section 564(b)(2) of the Act or the EUA is revoked 
under Section 564(g) of the Act.Fact Sheet for Healthcare 
Providers:https://www.[employer]com/sites/default/files/product/documents/Fact_Shee

l_QK_Tpnsntmuj_Qqpt_RLBO-XjH-3.pdfFact Sheet for Healthcare 
Patients:https://www.[employer].com/sites/default/files/product/
documents/Csbg_Taefk_Yqoiaadg_Pkld_WPVL-NvZ-0.pdfPerforming Laboratory:[facility], [place] [address]Ch[facility] surface 
[id number] 17:48:00





             Test Item    Value        Reference Range Interpretation Comments

 

             Hep B S Ab (test code <8.0         See_Comment                [Auto

mated



             = 46854-4)                                          message] The



                                                                 system which



                                                                 generated this



                                                                 result transmit

mgada



                                                                 reference range

:



                                                                 <8.0 mIU/mL. 

e



                                                                 reference range



                                                                 was not used to



                                                                 interpret this



                                                                 result as



                                                                 normal/abnormal

.

 

             PHILIPP (test code = PHILIPP)  ID -                           



                          DB                                     

 

             Lab Interpretation Normal                                 



             (test code = 19354-9)                                        



[id number] 17:48:00





             Test Item    Value        Reference Range Interpretation Comments

 

             HEPATITIS B SURFACE ANTIBODY < mIU/mL     <8.0                     

 



             (BEAKER) (test code = 647)                                        



 ID - DBHepatitis B surface [id number] 17:45:00





             Test Item    Value        Reference Range Interpretation Comments

 

             HBsAg Screen (test code Nonreactive  Nonreactive               



             = 5195-3)                                           

 

             PHILIPP (test code = PHILIPP) Specimen is                            



                          considered negative                           



                          for HBsAg.                             

 

             Lab Interpretation (test Normal                                 



             code = 36241-0)                                        



[facility], [id number] 17:45:00





             Test Item    Value        Reference Range Interpretation Comments

 

             Hep B Core Total Ab (test Nonreactive  Nonreactive               



             code = 74410-8)                                        

 

             PHILIPP (test code = PHILIPP)  ID - DB                           

 

             Lab Interpretation (test Normal                                 



             code = 29474-9)                                        



[facility] [id number] 17:45:00





             Test Item    Value        Reference Range Interpretation Comments

 

             HEPATITIS B SURFACE ANTIGEN (2) Nonreactive  Nonreactive           

    



             (BEAKER) (test code = 2585)                                        



Specimen is considered negative for HBsAg.HEPATITIS B CORE ANTIBODY, TOTAL
2021 17:45:00





             Test Item    Value        Reference Range Interpretation Comments

 

             HEPATITIS B CORE TOTAL ANTIBODY Nonreactive  Nonreactive           

    



             (BEAKER) (test code = 497)                                        



 ID - [id number] 16:01:00





             Test Item    Value        Reference Range Interpretation Comments

 

             Magnesium (test code = 1.7 mg/dL    1.6-2.6                   



             26030-4)                                            

 

             PHILIPP (test code = PHILIPP)  ID - TASHA C                          

 

 

             Lab Interpretation (test Normal                                 



             code = 66081-3)                                        



[facility] [id number] 16:01:00





             Test Item    Value        Reference Range Interpretation Comments

 

             TOTAL PROTEIN 6.5 gm/dL    6.0-8.3                   



             (BEAKER) (test code =                                        



             770)                                                

 

             ALBUMIN (BEAKER) 3.4 g/dL     3.5-5.0      L            



             (test code = 1145)                                        

 

             ALKALINE PHOSPHATASE 51 U/L                           



             (BEAKER) (test code =                                        



             346)                                                

 

             BILIRUBIN TOTAL 0.2 mg/dL    0.2-1.2                   



             (BEAKER) (test code =                                        



             377)                                                

 

             SODIUM (BEAKER) (test 138 meq/L    136-145                   



             code = 381)                                         

 

             POTASSIUM (BEAKER) 3.8 meq/L    3.5-5.1                   



             (test code = 379)                                        

 

             CHLORIDE (BEAKER) 103 meq/L                        



             (test code = 382)                                        

 

             CO2 (BEAKER) (test 27 meq/L     22-29                     



             code = 355)                                         

 

             BLOOD UREA NITROGEN 10 mg/dL     7-21                      



             (BEAKER) (test code =                                        



             354)                                                

 

             CREATININE (BEAKER) 0.73 mg/dL   0.57-1.25                 



             (test code = 358)                                        

 

             GLUCOSE RANDOM 103 mg/dL                        



             (BEAKER) (test code =                                        



             652)                                                

 

             CALCIUM (BEAKER) 8.9 mg/dL    8.4-10.2                  



             (test code = 697)                                        

 

             AST (SGOT) (BEAKER) 23 U/L       5-34                      



             (test code = 353)                                        

 

             ALT (SGPT) (BEAKER) 25 U/L       6-55                      



             (test code = 347)                                        

 

             EGFR (BEAKER) (test 104                                    ESTIMATE

D GFR IS



             code = 1092) mL/min/1.73 sq                           NOT AS ACCURA

TE AS



                          m                                      CREATININE



                                                                 CLEARANCE IN



                                                                 PREDICTING



                                                                 GLOMERULAR



                                                                 FILTRATION RATE

.



                                                                 ESTIMATED GFR I

S



                                                                 NOT APPLICABLE 

FOR



                                                                 DIALYSIS PATIEN

TS.



 ID - TASHA [id number] 16:01:00





             Test Item    Value        Reference Range Interpretation Comments

 

             MAGNESIUM (BEAKER) (test code = 1.7 mg/dL    1.6-2.6               

    



             627)                                                



 ID - TASHA MASON, TUNNEL CATH CENTRAL INS W/PORT -[id number] 15:24:00
Reason for exam:-&gt;Plan for inpatient chemotherapy nowReason for exam:-
&gt;Chemo inpatientAnesthesia:-&gt;None
************************************************************[facility]        : 1940        Sex: 
M************************************************************FINAL REPORT 
PATIENT ID:   [id number] Procedure: Portacath placement.                          
History: Need for chemotherapy   : Jhon Catalan MD. Assistant:
None.                                                                           
Modality: Sonography and fluoroscopy               DOSE REDUCTION: The 
examination was performed according to departmental dose-optimization program 
which includes automated exposure control, adjustment of the mA and/or kV.  
Fluoro time: 0.2 minutes. Radiation dose: 1.4 mGy air Kerma.   Number of images:
8 Sedation:    SEDATION: Moderate sedation was administered. 1.5 milligram of 
Versed and 75 micrograms of fentanyl IV was used for moderate sedation monitored
under my direction. Total intraservice time of sedation was 70 minutes. The 
patient's vital signs were monitored throughout the procedure and recorded in t
he patient's medical record by the nurse.                 Anesthesia: Lidocaine 
local infiltration.        Medicines: None       Estimated blood loss:  &lt; 5 
cc.          Technique: A thorough discussion of the risks, benefits, and 
alternatives of the procedure, blood transfusion if needed, and if applicable, 
moderate sedation was carried out with the patient or the patient's next of kin.
Any questions were answered. They verbalized understanding. A written informed 
consent was then signed.   A timeout was performed prior to starting the p
rocedure.  The procedure room personnel used personal protective equipment. The 
operators used sterile gowns and gloves in addition. The surgical site was 
prepped with chlorhexidine gluconate and draped in the maximal sterile fashion. 
A preliminary ultrasonogram was performed of the neck that revealeda patent and 
compressible internal jugular vein. Pertinent ultrasound images were stored in 
the PACSfor documentation. Using aseptic precautions, real-time ultrasound 
guidance, the internal jugular vein was accessed after local anesthetic 
infiltration and dermatotomy with a micropuncture needle. A 018 guidewire was 
advanced into the central venous system under fluoroscopic guidance. Over the 
wire a micropuncture sheath was placed. Through the micropuncture sheath, a 035 
wire was advanced into the venous system under fluoroscopic guidance. Over the 
wire a peel-away sheath was placed. After local anesthesia, using sharp and 
blunt dissection, a reservoir pocket of appropriate size was created in the
lateral subclavicular area. The catheter was connected to the port reservoir. 
The catheter was tunneled to the venotomy site using a tunneling device. The the
reservoir was placed in the reservoir pocket. The catheter was trimmed to an 
appropriate length. The catheter was advanced into the venous system through the
peel-away sheath which was removed. The port aspirated and flushed well and was 
terminally packed with heparin 100 units per cc. The port reservoir was 
irrigated with saline. The incisionwas closed in layers using absorbable suture 
and Dermabond. The venotomy site was closed using Dermabond. An aseptic dressing
was applied using the protocol for Dermabond. The patient was transferred to the
recovery area and was discharged from the department in stable condition.       
            Complications: None immediate.     Findings: Patent and compressible
internal jugular vein. Final imageshows the basil catheter to be in good 
position with the catheter tip at the cavoatrial junction/right atrium, an 
excellent position for use. There is no immediate complication.                 
               Impression:    Successful ultrasound and fluoroscopic guided 
right internal jugular vein route single lumen Port-A-Cath placement as 
described above. Thank you for the opportunity to assist in the care of your 
patient. Signed: Jhon Catalan MDReport Verified Date/Time:  2021 
15:24:08 Reading Location: [place] [address] Angio Body Reading Room    Electronically
signedby: JHON CATALAN MD on 2021 03:24 PMIR Port-a-Cath 
[id number] 15:24:00Interface, External Ris In - 2021  3:26 PM 
CSTFINAL REPORT PATIENT ID:   [id number] Procedure: Portacath placement.          
                                       History: Need for chemotherapyPrimary 
: Jhon Catalan MD. Assistant: None.                            
Modality: Sonography and fluoroscopy               DOSE REDUCTION: The ex
amination was performed according to departmental dose-optimization program 
which includes automatedexposure control, adjustment of the mA and/or kV.  
Fluoro time: 0.2 minutes. Radiation dose: 1.4 mGyair Kerma.   Number of images: 
8 Sedation:    SEDATION: Moderate sedation was administered. 1.5 milligram of 
Versed and 75 micrograms of fentanyl IV was used for moderate sedation monitored
under my direction. Total intraservice time of sedation was 70 minutes. The 
patient's vital signs were monitoredthroughout the procedure and recorded in the
patient's medical record by the nurse.                                          
                Anesthesia: Lidocaine local infiltration.       Medicines: None 
     Estimated blood loss:  &lt; 5 cc.          Technique: A thorough discussion
of the risks, benefits, and alternatives of the procedure, blood transfusion if 
needed, and if applicable, moderate sedation was carried out with the patient or
the patient's next of kin. Any questions were answered. They verbalized 
understanding. A written informed consent was then signed.   A timeout was 
performed prior to starting the procedure.  The procedure room personnel used 
personal protective equipment. The operators used sterile gowns and gloves in 
addition. The surgical site was prepped with chlorhexidine gluconate and draped 
in the maximal sterile fashion. A preliminary ultrasonogram was performed of the
neck that revealed a patent and compressible internal jugular vein. Pertinent
ultrasound images were stored in the PACS for documentation. Using aseptic 
precautions, real-time ultrasound guidance, the internal jugular vein was 
accessed after local anesthetic infiltration and dermatotomy with a 
micropuncture needle. A 018 guidewire was advanced into the central venous 
system under fluoroscopic guidance. Over the wire a micropuncture sheath was 
placed. Through the micropuncture sheath, a 035 wire was advanced into the 
venous system under fluoroscopic guidance. Over the wire a peel-away sheath was 
placed. After local anesthesia, using sharp and blunt dissection, a reservoir 
pocket of appropriate size was created in the lateral subclavicular area. The 
catheter was connected to the port reservoir. The catheter was tunneled to the 
venotomy site using a tunneling device. The the reservoir was placed in the 
reservoir pocket. The catheter was trimmed to an appropriate length. The c
atheter was advanced into the venous system through the peel-away sheath which 
was removed. The portaspirated and flushed well and was terminally packed with 
heparin 100 units per cc. The port reservoir was irrigated with saline. The 
incision was closed in layers using absorbable suture and Dermabond. The 
venotomy site was closed using Dermabond. An aseptic dressing was applied using 
the protocol for Dermabond. The patient was transferred to the recovery area and
was discharged from the department in stable condition.                    
Complications: None immediate.     Findings: Patent and compressible internal 
jugular vein. Final image shows the basil catheter to be in good position with 
the catheter tip at the cavoatrial junction/right atrium, an excellent position 
for use. There is no immediate complication.                                    
                     Impression:    Successfulultrasound and fluoroscopic guided
right internal jugular vein route single lumen Port-A-Cath placement as 
described above. Thank you for the opportunity to assist in the care of your 
patient. Signed: Jhon Catalan Verified Date/Time:  2021 
15:24:08 Reading Location: [place] [id number] Angio Body Reading Room    Electronically
signed by: JHON CATALAN MD on 2021 03:24 [facility]Pro[facility]/[id number] 05:17:00





             Test Item    Value        Reference    Interpretation Comments



                                       Range                     

 

             Protime (test code = 12.8         See_Comment                [Autom

ated



             5902-2)                                             message] The



                                                                 system which



                                                                 generated this



                                                                 result



                                                                 transmitted



                                                                 reference range

:



                                                                 11.9 - 14.2



                                                                 seconds. The



                                                                 reference range



                                                                 was not used to



                                                                 interpret this



                                                                 result as



                                                                 normal/abnormal

.

 

             INR (test code = 0.99         See_Comment                [Automated



             6301-6)                                             message] The



                                                                 system which



                                                                 generated this



                                                                 result



                                                                 transmitted



                                                                 reference range

:



                                                                 <=5.90. The



                                                                 reference range



                                                                 was not used to



                                                                 interpret this



                                                                 result as



                                                                 normal/abnormal

.

 

             PHILIPP (test code = Effective 2019:                           



             PHILIPP)         PT Reference Range                           



                          ChangeNew: 11.9-14.2                           



                           Previous: 11.7-14.7                           



                          RECOMMENDED                            



                          COUMADIN/WARFARIN                           



                          INR THERAPY                            



                          RANGESSTANDARD DOSE:                           



                          2.0-3.0  Includes:                           



                          PROPHYLAXIS for                           



                          venous thrombosis,                           



                          systemic                               



                          embolization;                           



                          TREATMENT for venous                           



                          thrombosis and/or                           



                          pulmonary                              



                          embolus.HIGH RISK:                           



                          Target INR is                           



                          2.5-3.5 for patients                           



                          wiht mechanical                           



                          heart valves.                           

 

             Lab Interpretation Normal                                 



             (test code =                                        



             76961-7)                                            



[facility] CenterPROTHROMBIN TIME/[id number] 05:17:00





             Test Item    Value        Reference Range Interpretation Comments

 

             PROTIME (BEAKER) 12.8 seconds 11.9-14.2                 



             (test code = 759)                                        

 

             INR (BEAKER) (test 0.99         See_Comment                [Automat

ed message]



             code = 370)                                         The system [employer]

[employer]



                                                                 generated this 

result



                                                                 transmitted ref

erence



                                                                 range: <=5.90. 

The



                                                                 reference range

 was



                                                                 not used to int

erpret



                                                                 this result as



                                                                 normal/abnormal

.



Effective 2019: PT Reference Range ChangeNew: 11.9-14.2  Previous: 11.7-
14.7RECOMMENDED COUMADIN/WARFARIN INR THERAPY RANGESSTANDARD DOSE: 2.0-3.0  
Includes: PROPHYLAXIS for venous thrombosis, systemic embolization; TREATMENT 
for venous thrombosis and/or pulmonary embolus.HIGH RISK: Target INR is2.5-3.5 
for patients wiht mechanical heart valves.CBC W/PLT COUNT &amp; AUTO 
[id number] 05:07:00





             Test Item    Value        Reference Range Interpretation Comments

 

             WHITE BLOOD CELL COUNT (BEAKER) 8.7 K/ L     3.5-10.5              

    



             (test code = 775)                                        

 

             RED BLOOD CELL COUNT (BEAKER) 3.32 M/ L    4.63-6.08    L          

  



             (test code = 761)                                        

 

             HEMOGLOBIN (BEAKER) (test code = 10.7 GM/DL   13.7-17.5    L       

     



             410)                                                

 

             HEMATOCRIT (BEAKER) (test code = 32.4 %       40.1-51.0    L       

     



             411)                                                

 

             MEAN CORPUSCULAR VOLUME (BEAKER) 97.6 fL      79.0-92.2    H       

     



             (test code = 753)                                        

 

             MEAN CORPUSCULAR HEMOGLOBIN 32.2 pg      25.7-32.2                 



             (BEAKER) (test code = 751)                                        

 

             MEAN CORPUSCULAR HEMOGLOBIN CONC 33.0 GM/DL   32.3-36.5            

     



             (BEAKER) (test code = 752)                                        

 

             RED CELL DISTRIBUTION WIDTH 13.2 %       11.6-14.4                 



             (BEAKER) (test code = 412)                                        

 

             PLATELET COUNT (BEAKER) (test 316 K/CU MM  150-450                 

  



             code = 756)                                         

 

             MEAN PLATELET VOLUME (BEAKER) 10.2 fL      9.4-12.4                

  



             (test code = 754)                                        

 

             NUCLEATED RED BLOOD CELLS 0 /100 WBC   0-0                       



             (BEAKER) (test code = 413)                                        

 

             NEUTROPHILS RELATIVE PERCENT 77 %                                  

 



             (BEAKER) (test code = 429)                                        

 

             LYMPHOCYTES RELATIVE PERCENT 12 %                                  

 



             (BEAKER) (test code = 430)                                        

 

             MONOCYTES RELATIVE PERCENT 7 %                                    



             (BEAKER) (test code = 431)                                        

 

             EOSINOPHILS RELATIVE PERCENT 3 %                                   

 



             (BEAKER) (test code = 432)                                        

 

             BASOPHILS RELATIVE PERCENT 0 %                                    



             (BEAKER) (test code = 437)                                        

 

             NEUTROPHILS ABSOLUTE COUNT 6.76 K/ L    1.78-5.38    H            



             (BEAKER) (test code = 670)                                        

 

             LYMPHOCYTES ABSOLUTE COUNT 1.04 K/ L    1.32-3.57    L            



             (BEAKER) (test code = 414)                                        

 

             MONOCYTES ABSOLUTE COUNT (BEAKER) 0.63 K/ L    0.30-0.82           

      



             (test code = 415)                                        

 

             EOSINOPHILS ABSOLUTE COUNT 0.27 K/ L    0.04-0.54                 



             (BEAKER) (test code = 416)                                        

 

             BASOPHILS ABSOLUTE COUNT (BEAKER) 0.00 K/ L    0.01-0.08    L      

      



             (test code = 417)                                        

 

             IMMATURE GRANULOCYTES-RELATIVE 1 %          0-1                    

   



             PERCENT (BEAKER) (test code =                                      

  



             2801)                                               



ECG 12 [id number] 07:27:48Interface, External Ris In - 2021  7:27 AM 
CSTVentricular Rate 67 BPMAtrial Rate 67 BPMP-R Interval 164 msQRS Duration 104 
msQ-T Interval 376 msQTC Calculation(Bazett) 397 msP Axis 65 degreesR Axis 8 
degreesT Axis 112 degreesSinus rhythm with Premature atrial complexesAnterior 
infarct  , age undeterminedST elevation in V2-V3 with T wabe inversion in I, 
aVL, V2-V6 consider recent STEMIAbnormal ECGNo previous ECGs availableConfirmed 
by MD KISHOR, DARYL ([id number]) on 3/2/2021 7:27:43 [facility]2D Echo W/Doppler(CW/PW/Color)2021 18:41:45Ejection FractionSLEH 
ECHO HEARTLAB MKCKESSON CPACSInterface, External Ris In - 2021  6:41 PM C
STTransthoracic Echocardiography Report (TTE) Demographics  Patient Name  DELFINO ADLER      Date of Study      2021               [facility]  MRN           
[id number]          Gender             MaleVisit Number  [id number]        Race   
           Unknown  Accession     [id number]         Room Number        1638 
Number  Date of Birth 1940        Referring          Andree Mendoza       
            Physician          MARS Shannon  Age           81 year(s)        
Sonographer   Abel Weeks [facility]                                  Interpreting   
   Rafael Andrews MD                           Physician Procedure Type of 
Study     TTE procedure:2DECHO W DOPPLER(CW/PW/COLOR) (ASAP) Indications:Known 
or suspected cardiomyopathy.Clinical HistoryCancerCongestive Heart F
ailureCoronary Artery DiseaseIschemic CardiomyopathyHypertensionAICDPacemakerS/P
StentsHGB 11.3HCT 34 %Contrast Medium: Definity. Amount - 2 mlHeight: 68 inches 
Weight: 69.85 kg (154 lbs) BSA: 1.83 m^2BMI: 23.42 kg/m^2HR: 78 bpm BP: 141/62 
mmHg Summary The left ventricle is chamber size (by vol index) is severely 
enlarged (female - LVED vol &gt;80ml/m2). The following segment(s) appear 
akinetic: midto apical anteroseptum and apical segments . LVEF by Bermudez's 
method of disk assessment is moderately reduced (30-34%) .  Degree of diastolic 
dysfunction (LAP assessment) is inconclusive due to arrhythmia .  Estimated peak
systolic PA pressure is 30-35 mmHg .  Signature  ------------------------------
---------------------------------- Electronically signed by Rafael Andrews MD(Interpreting physician) on 2021 06:41 PM 
----------------------------------------------------------------  Findings  Left
Ventricle         The left ventricle is chamber size (by vol index)             
          is severely enlarged (female - LVED vol &gt;80ml/m2).                 
      Normal LV wall thickness. The following segment(s)                        
appear akinetic: mid to apical anteroseptum and                   apical 
segments .                        The other segments are mildly hypokinetic.    
                   LVEF by Bermudez's method of disk assessment is               
        moderately reduced (30-34%) . Degree of diastolic                       
dysfunction (LAP assessment) is inconclusive due to                        
arrhythmia . LV endocardium is fairly visualized            with IV ultrasound 
enhancing agent.  Left Atrium            LA size is mildly enlarged (35-41 
ml/m2) .  Right Ventricle        The right ventricular chamber size and systolic
       function are within normal limits.                        RV pacing wire 
is visualized .  Right Atrium           RA size is normal.  Aortic Valve        
  Mild AoV cusp thickening.  Mitral Valve           Mild MV leaflet thickening. 
Tricuspid Valve        A trace of tricuspid regurgitation.                      
Estimated peak systolic PA pressure is 30-35 mmHg .  Pulmonic Valve         PVis
not well visualized; function appears normal                        by Doppler 
visualized.  Pericardium            No pericardial effusion is visualized.  
IVC/SVC/PA/PV/Pleural  The estimated RA pressure by IVC dynamics 0-5mmHg . 
Chambers/Structures Left Atrium  LA Volume: 67.1 ml   LA Area: 22.32 cm^2 LA 
Vol. Index: 37 ml/m^2  Left Ventricle  LVIDd: 5.7 cm           LVEDV:160.31 ml 
LV Septum Diastolic: 0.88 cm LV PW Diastolic: 0.77 cm LVEDV Bermudez's:187.8 ml 
LVESV Bermudez's:123.26 ml LVEF Bermudez's: 34.4 %                         LVEDVI:
103 ml/m^2                                           LVESVI: 67 ml/m^2 LVOT 
Diameter: 2.09 cm Doppler/Quantitative Measurements Mitral Valve  MV Peak E-
Wave: 1.22 m/s           MV Peak A-Wave: 0.53 m/s                         E/A 
Ratio: 2.31                                    Peak Gradient: 6 mmHg            
                   Deceleration Time: 125.8 msec  MV Yimi. Peak:  Aortic Valve  
Peak Velocity: 1.21 m/s                  Mean Velocity: 0.83 m/s Peak Gradient: 
5.9 mmHg                  Mean Gradient: 3.16 mmHg AV Area (continuity): 2.5 
cm^2 AV VTI: 23.4 cm  AV DVI: 0.73  LVOT  Peak Velocity: 0.97 m/s             
Peak Gradient: 3.79 mmHg Mean Velocity: 0.59 m/s             Mean Gradient: 1.73
mmHg LVOT Diameter: 2.09 cm              LVOT VTI: 17.09 cm LVOT Area: 3.43 cm^2
               LVOT SV:58.6 ml LVOT CO: 4.57 l/min                 LVOT CI: 2.5 
l/min/m^2  Tricuspid Valve  TR Velocity: 2.54 m/s TR Gradient: 25.82 mmHgCHI [facility] metabolic [id number] 05:47:00





             Test Item    Value        Reference Range Interpretation Comments

 

             Sodium (test code = 137 meq/L    136-145                   



             2951-2)                                             

 

             Potassium (test code = 3.7 meq/L    3.5-5.1                   



             2823-3)                                             

 

             Chloride (test code = 105 meq/L                        



             2075-0)                                             

 

             CO2 (test code = 21 meq/L     22-29        L            



             8-9)                                             

 

             BUN (test code = 9 mg/dL      7-21                      



             3094-0)                                             

 

             Creatinine (test code 0.73 mg/dL   0.57-1.25                 



             = 2160-0)                                           

 

             Glucose (test code = 98 mg/dL                         



             2345-7)                                             

 

             Calcium (test code = 8.9 mg/dL    8.4-10.2                  



             03075-7)                                            

 

             EGFR (test code = 104          mL/min/1.73 sq m              ESTIMA

MAGDA GFR IS



             33914-3)                                            NOT AS ACCURATE



                                                                 AS CREATININE



                                                                 CLEARANCE IN



                                                                 PREDICTING



                                                                 GLOMERULAR



                                                                 FILTRATION RATE

.



                                                                 ESTIMATED GFR I

S



                                                                 NOT APPLICABLE



                                                                 FOR DIALYSIS



                                                                 PATIENTS.

 

             PHILIPP (test code = PHILIPP)  ID -                           



                          EDASI                                  

 

             Lab Interpretation Abnormal                               



             (test code = 86741-4)                                        



[facility] CenterHepatic function [id number] 05:47:00





             Test Item    Value        Reference Range Interpretation Comments

 

             Protein, Total (test 6.3          See_Comment                [Autom

ated



             code = 2885-2)                                        message] The



                                                                 system which



                                                                 generated this



                                                                 result transmit

magda



                                                                 reference range

:



                                                                 6.0 - 8.3 gm/dL

.



                                                                 The reference



                                                                 range was not u

sed



                                                                 to interpret th

is



                                                                 result as



                                                                 normal/abnormal

.

 

             Albumin (test code = 3.5 g/dL     3.5-5                     



             41430-1)                                            

 

             Total Bilirubin (test 0.3 mg/dL    0.2-1.2                   



             code = 1975-2)                                        

 

             Bilirubin, Direct 0.2 mg/dL    0.1-0.5                   



             (test code = 1968-7)                                        

 

             Alkaline Phosphatase 45 U/L                           



             (test code = 6768-6)                                        

 

             AST (test code = 19 U/L       5-34                      



             1920-8)                                             

 

             ALT (test code = 21 U/L       6-55                      



             1742-6)                                             

 

             PHILIPP (test code = PHILIPP)  ID -                           



                          EDASI                                  

 

             Lab Interpretation Normal                                 



             (test code = 65651-4)                                        



[facility]





             Test Item    Value        Reference Range Interpretation Comments

 

             MAGNESIUM (BEAKER) (test code = 1.6 mg/dL    1.6-2.6               

    



             627)                                                



 ID - EDASIHEPATIC FUNCTION [id number] 05:47:00





             Test Item    Value        Reference Range Interpretation Comments

 

             TOTAL PROTEIN (BEAKER) (test code = 6.3 gm/dL    6.0-8.3           

        



             770)                                                

 

             ALBUMIN (BEAKER) (test code = 1145) 3.5 g/dL     3.5-5.0           

        

 

             BILIRUBIN TOTAL (BEAKER) (test code 0.3 mg/dL    0.2-1.2           

        



             = 377)                                              

 

             BILIRUBIN DIRECT (BEAKER) (test 0.2 mg/dL    0.1-0.5               

    



             code = 706)                                         

 

             ALKALINE PHOSPHATASE (BEAKER) (test 45 U/L                   

        



             code = 346)                                         

 

             AST (SGOT) (BEAKER) (test code = 19 U/L       5-34                 

     



             353)                                                

 

             ALT (SGPT) (BEAKER) (test code = 21 U/L       6-55                 

     



             347)                                                



 ID - EDASIBASIC METABOLIC [id number] 05:47:00





             Test Item    Value        Reference Range Interpretation Comments

 

             SODIUM (BEAKER) 137 meq/L    136-145                   



             (test code = 381)                                        

 

             POTASSIUM (BEAKER) 3.7 meq/L    3.5-5.1                   



             (test code = 379)                                        

 

             CHLORIDE (BEAKER) 105 meq/L                        



             (test code = 382)                                        

 

             CO2 (BEAKER) (test 21 meq/L     22-29        L            



             code = 355)                                         

 

             BLOOD UREA NITROGEN 9 mg/dL      7-21                      



             (BEAKER) (test code                                        



             = 354)                                              

 

             CREATININE (BEAKER) 0.73 mg/dL   0.57-1.25                 



             (test code = 358)                                        

 

             GLUCOSE RANDOM 98 mg/dL                         



             (BEAKER) (test code                                        



             = 652)                                              

 

             CALCIUM (BEAKER) 8.9 mg/dL    8.4-10.2                  



             (test code = 697)                                        

 

             EGFR (BEAKER) (test 104 mL/min/1.73                           ESTIM

ATED GFR IS



             code = 1092) sq m                                   NOT AS ACCURATE

 AS



                                                                 CREATININE



                                                                 CLEARANCE IN



                                                                 PREDICTING



                                                                 GLOMERULAR



                                                                 FILTRATION RATE

.



                                                                 ESTIMATED GFR I

S



                                                                 NOT APPLICABLE 

FOR



                                                                 DIALYSIS PATIEN

TS.



 ID - EDASICBC W/PLT COUNT &amp; AUTO [id number] 05:36:00





             Test Item    Value        Reference Range Interpretation Comments

 

             WHITE BLOOD CELL COUNT (BEAKER) 12.0 K/ L    3.5-10.5     H        

    



             (test code = 775)                                        

 

             RED BLOOD CELL COUNT (BEAKER) 3.56 M/ L    4.63-6.08    L          

  



             (test code = 761)                                        

 

             HEMOGLOBIN (BEAKER) (test code = 11.3 GM/DL   13.7-17.5    L       

     



             410)                                                

 

             HEMATOCRIT (BEAKER) (test code = 34.0 %       40.1-51.0    L       

     



             411)                                                

 

             MEAN CORPUSCULAR VOLUME (BEAKER) 95.5 fL      79.0-92.2    H       

     



             (test code = 753)                                        

 

             MEAN CORPUSCULAR HEMOGLOBIN 31.7 pg      25.7-32.2                 



             (BEAKER) (test code = 751)                                        

 

             MEAN CORPUSCULAR HEMOGLOBIN CONC 33.2 GM/DL   32.3-36.5            

     



             (BEAKER) (test code = 752)                                        

 

             RED CELL DISTRIBUTION WIDTH 13.3 %       11.6-14.4                 



             (BEAKER) (test code = 412)                                        

 

             PLATELET COUNT (BEAKER) (test 268 K/CU MM  150-450                 

  



             code = 756)                                         

 

             MEAN PLATELET VOLUME (BEAKER) 10.2 fL      9.4-12.4                

  



             (test code = 754)                                        

 

             NUCLEATED RED BLOOD CELLS 0 /100 WBC   0-0                       



             (BEAKER) (test code = 413)                                        

 

             NEUTROPHILS RELATIVE PERCENT 82 %                                  

 



             (BEAKER) (test code = 429)                                        

 

             LYMPHOCYTES RELATIVE PERCENT 11 %                                  

 



             (BEAKER) (test code = 430)                                        

 

             MONOCYTES RELATIVE PERCENT 5 %                                    



             (BEAKER) (test code = 431)                                        

 

             EOSINOPHILS RELATIVE PERCENT 1 %                                   

 



             (BEAKER) (test code = 432)                                        

 

             BASOPHILS RELATIVE PERCENT 0 %                                    



             (BEAKER) (test code = 437)                                        

 

             NEUTROPHILS ABSOLUTE COUNT 9.85 K/ L    1.78-5.38    H            



             (BEAKER) (test code = 670)                                        

 

             LYMPHOCYTES ABSOLUTE COUNT 1.34 K/ L    1.32-3.57                 



             (BEAKER) (test code = 414)                                        

 

             MONOCYTES ABSOLUTE COUNT (BEAKER) 0.63 K/ L    0.30-0.82           

      



             (test code = 415)                                        

 

             EOSINOPHILS ABSOLUTE COUNT 0.09 K/ L    0.04-0.54                 



             (BEAKER) (test code = 416)                                        

 

             BASOPHILS ABSOLUTE COUNT (BEAKER) 0.01 K/ L    0.01-0.08           

      



             (test code = 417)                                        

 

             IMMATURE GRANULOCYTES-RELATIVE 0 %          0-1                    

   



             PERCENT (BEAKER) (test code =                                      

  



             2801)                                               



PROTHROMBIN [id number] 05:33:00





             Test Item    Value        Reference Range Interpretation Comments

 

             PROTIME (BEAKER) 12.8 seconds 11.9-14.2                 



             (test code = 759)                                        

 

             INR (BEAKER) (test 1.00         See_Comment                [Automat

ed message]



             code = 370)                                         The system [employer]



                                                                 generated this 

result



                                                                 transmitted ref

erence



                                                                 range: <=5.90. 

The



                                                                 reference range

 was



                                                                 not used to int

erpret



                                                                 this result as



                                                                 normal/abnormal

.



Effective 2019: PT Reference Range ChangeNew: 11.9-14.2  Previous: 11.7-
14.7RECOMMENDED COUMADIN/WARFARIN INR THERAPY RANGESSTANDARD DOSE: 2.0-3.0  
Includes: PROPHYLAXIS for venous thrombosis, systemic embolization; TREATMENT 
for venous thrombosis and/or pulmonary embolus.HIGH RISK: Target INR is2.5-3.5 
for patients wiht mechanical heart valves.Type and screen, automated ([facility] and 
[facility] only)2021 10:55:00





             Test Item    Value        Reference Range Interpretation Comments

 

             ABO/RH AUTOMATED (BEAKER) (test O POSITIVE                         

    



             code = 2260)                                        

 

             Ab Scrn (test code = 890-4) NEGATIVE                               



[facility]
2021 10:48:00





             Test Item    Value        Reference Range Interpretation Comments

 

             Color, UA (test code Red                                    



             = 5778-6)                                           

 

             Clarity, UA (test Cloudy                                 



             code = 5767-9)                                        

 

             Specific [place], UA                                        Gross b

lood.



             (test code = 5811-5)                                        Interfe

ring substances



                                                                 present in urin

e,



                                                                 unable to confi

rm



                                                                 results.

 

             pH, UA (test code =                                        Gross bl

ood.



             5803-2)                                             Interfering sub

stances



                                                                 present in urin

e,



                                                                 unable to confi

rm



                                                                 results.

 

             Protein, UA (test                                        Gross bloo

d.



             code = 43182-8)                                        Interfering 

substances



                                                                 present in urin

e,



                                                                 unable to confi

rm



                                                                 results.

 

             Glucose, UA (test                                        Gross bloo

d.



             code = 365)                                         Interfering sub

stances



                                                                 present in urin

e,



                                                                 unable to confi

rm



                                                                 results.

 

             Ketones, UA (test                                        Gross bloo

d.



             code = 2514-8)                                        Interfering s

ubstances



                                                                 present in urin

e,



                                                                 unable to confi

rm



                                                                 results.

 

             Bilirubin, UA (test                                        Gross bl

ood.



             code = 47603-4)                                        Interfering 

substances



                                                                 present in urin

e,



                                                                 unable to confi

rm



                                                                 results.

 

             Blood, UA (test code                                        Gross b

lood.



             = 18042-6)                                          Interfering sub

stances



                                                                 present in urin

e,



                                                                 unable to confi

rm



                                                                 results.

 

             Nitrite, UA (test                                        Gross bloo

d.



             code = 5802-4)                                        Interfering s

ubstances



                                                                 present in urin

e,



                                                                 unable to confi

rm



                                                                 results.

 

             Leukocytes, UA (test                                        Gross b

lood.



             code = 5799-2)                                        Interfering s

ubstances



                                                                 present in urin

e,



                                                                 unable to confi

rm



                                                                 results.

 

             Urobilinogen, UA                                        Gross blood

.



             (test code = 75901-9)                                        Interf

ering substances



                                                                 present in urin

e,



                                                                 unable to confi

rm



                                                                 results.

 

             Bacteria, UA (test Occasional                             



             code = 02353-9)                                        

 

             RBC, UA (test code =        See_Comment                [Autom

ated message]



             799-7)                                              The system [employer]



                                                                 generated this 

result



                                                                 transmitted ref

erence



                                                                 range: /HPF. Th

e



                                                                 reference range

 was



                                                                 not used to int

erpret



                                                                 this result as



                                                                 normal/abnormal

.

 

             WBC, UA (test code = <5           See_Comment                [Autom

ated message]



             34757-3)                                            The system [employer]



                                                                 generated this 

result



                                                                 transmitted ref

erence



                                                                 range: /HPF. Th

e



                                                                 reference range

 was



                                                                 not used to int

erpret



                                                                 this result as



                                                                 normal/abnormal

.

 

             SQUAMOUS EPITHELIAL <5           See_Comment                [Automa

magda message]



             (test code = 98273-7)                                        The sy

stem which



                                                                 generated this 

result



                                                                 transmitted ref

erence



                                                                 range: /HPF. Th

e



                                                                 reference range

 was



                                                                 not used to int

erpret



                                                                 this result as



                                                                 normal/abnormal

.

 

             Specimen Source (test                                        



             code = 2795)                                        



[facility] [id number] 
10:48:00





             Test Item    Value        Reference Range Interpretation Comments

 

             COLOR (BEAKER) (test Red                                    



             code = 470)                                         

 

             CLARITY (BEAKER) (test Cloudy                                 



             code = 469)                                         

 

             SPECIFIC GRAVITY UA                                        Gross bl

ood.



             (BEAKER) (test code =                                        Interf

ering



             468)                                                substances pres

ent



                                                                 in urine, unabl

e to



                                                                 confirm results

.

 

             PH UA (BEAKER) (test                                        Gross b

lood.



             code = 467)                                         Interfering



                                                                 substances pres

ent



                                                                 in urine, unabl

e to



                                                                 confirm results

.

 

             PROTEIN UA (BEAKER)                                        Gross bl

ood.



             (test code = 464)                                        Interferin

g



                                                                 substances pres

ent



                                                                 in urine, unabl

e to



                                                                 confirm results

.

 

             GLUCOSE UA (BEAKER)                                        Gross bl

ood.



             (test code = 365)                                        Interferin

g



                                                                 substances pres

ent



                                                                 in urine, unabl

e to



                                                                 confirm results

.

 

             KETONES UA (BEAKER)                                        Gross bl

ood.



             (test code = 371)                                        Interferin

g



                                                                 substances pres

ent



                                                                 in urine, unabl

e to



                                                                 confirm results

.

 

             BILIRUBIN UA (BEAKER)                                        Gross 

blood.



             (test code = 462)                                        Interferin

g



                                                                 substances pres

ent



                                                                 in urine, unabl

e to



                                                                 confirm results

.

 

             BLOOD UA (BEAKER)                                        Gross bloo

d.



             (test code = 461)                                        Interferin

g



                                                                 substances pres

ent



                                                                 in urine, unabl

e to



                                                                 confirm results

.

 

             NITRITE UA (BEAKER)                                        Gross bl

ood.



             (test code = 465)                                        Interferin

g



                                                                 substances pres

ent



                                                                 in urine, unabl

e to



                                                                 confirm results

.

 

             LEUKOCYTE ESTERASE UA                                        Gross 

blood.



             (BEAKER) (test code =                                        Interf

ering



             466)                                                substances pres

ent



                                                                 in urine, unabl

e to



                                                                 confirm results

.

 

             UROBILINOGEN UA                                        Gross blood.



             (BEAKER) (test code =                                        Interf

ering



             463)                                                substances pres

ent



                                                                 in urine, unabl

e to



                                                                 confirm results

.

 

             BACTERIA (BEAKER) Occasional                             



             (test code = 517)                                        

 

             RBC UA-MANUAL (BEAKER)  /HPF                            



             (test code = 1659)                                        

 

             WBC UA-MANUAL (BEAKER) <5 /HPF                                



             (test code = 1661)                                        

 

             SQUAMOUS EPITHELIAL <5 /HPF                                



             MANUAL (BEAKER) (test                                        



             code = 1663)                                        

 

             SOURCE(BEAKER) (test                                        



             code = 2795)                                        



SARS-COV2/RT-PCR ([facility]; [facility])2021 10:25:00





             Test Item    Value        Reference Range Interpretation Comments

 

             SARS-COV2/RT-PCR Negative     Not Detected,              Performanc

e of the Xpert



             (test code =              Negative, See              Xpress



             [id number])                  external report              SARS-CoV-2/F

stormy/RSV test



                                       for linked test              has only bee

n



                                                                 established in



                                                                 nasopharyngeal 

swab



                                                                 specimens. Use 

of the



                                                                 Xpert Xpress



                                                                 SARS-CoV-2/Flu/

RSV test



                                                                 with other spec

imen



                                                                 types has not b

een



                                                                 assessed and pe

rformance



                                                                 characteristics

 are



                                                                 unknown.  As wi

th any



                                                                 molecular test,



                                                                 mutations withi

n the



                                                                 targeted geneti

c regions



                                                                 identified by t

he Xpert



                                                                 Xpress



                                                                 SARS-CoV-2/Flu/

RSV test



                                                                 could affect pr

colin



                                                                 and/or probe bi

nding



                                                                 resulting in fa

ilure to



                                                                 detect the pres

ence of



                                                                 virus or the vi

anisa being



                                                                 detected less



                                                                 predictably.Neg

ative



                                                                 results do not 

preclude



                                                                 SARS-CoV-2, Inf

luenza



                                                                 A/B, or RSV inf

ection



                                                                 and should not 

be used



                                                                 as the sole bas

is for



                                                                 treatment or ot

her



                                                                 patient managem

ent



                                                                 decisions.  Res

ults from



                                                                 the Xpert Xpres

s



                                                                 SARS-CoV-2/Flu/

RSV test



                                                                 should be corre

lated



                                                                 with the clinic

al



                                                                 history, epidem

iological



                                                                 data, and other

 data



                                                                 available to th

e



                                                                 clinician evalu

ating the



                                                                 patient.  Inval

id test



                                                                 results may occ

ur from



                                                                 improper specim

en



                                                                 collection; lisa

lure to



                                                                 follow the chandler

mmended



                                                                 sample collecti

on,



                                                                 handling, and s

torage



                                                                 procedures; eddie

hnical



                                                                 error. False ne

gative



                                                                 results may occ

ur if



                                                                 virus is presen

t at



                                                                 levels below th

e



                                                                 analytical limi

t of



                                                                 detection (LOD:

 131



                                                                 copies/mL).  Vi

ral



                                                                 nucleic acid ma

y persist



                                                                 in vivo, indepe

ndent of



                                                                 virus viability

.



                                                                 Detection of an

alyte



                                                                 target(s) does 

not imply



                                                                 that the corres

ponding



                                                                 virus(es) are i

nfectious



                                                                 or are the caus

ative



                                                                 agents for clin

ical



                                                                 symptoms.  Rece

nt



                                                                 patient exposur

e to



                                                                 FluMist  or oth

er live



                                                                 attenuated infl

uenza



                                                                 vaccines may ca

use



                                                                 inaccurate posi

tive



                                                                 results.This te

st has



                                                                 been authorized

 by FDA



                                                                 under an EUA fo

r use by



                                                                 authorized labo

ratories.



                                                                  This test is o

nly



                                                                 authorized for 

the



                                                                 duration of the



                                                                 declaration domingo

t



                                                                 circumstances e

xist



                                                                 justifying the



                                                                 authorization o

f



                                                                 emergency use o

f in



                                                                 vitro diagnosti

c tests



                                                                 for detection a

nd/or



                                                                 diagnosis of CO

VID-19



                                                                 under Section 5

64(b)(1)



                                                                 of the Federal 

Food,



                                                                 Drug and Cosmet

ic Act,



                                                                 21 U.S.C.



                                                                 360bbb-3(b)(1),

 unless



                                                                 the authorizati

on is



                                                                 terminated or r

evoked



                                                                 sooner.Fact She

et for



                                                                 Healthcare Prov

iders:



                                                                 [email or web address]



                                                                 Documents/Xpert

%20Xpress



                                                                 %11DNTS-QxB-4-F

stormy-RSV/30



                                                                 %20Rev.%2

0B%20HCP%



                                                                 20Fact%20Sheet.

pdfFact



                                                                 Sheet for Healt

hcare



                                                                 Patients:



                                                                 [email or web address]



                                                                 Documents/Xpert

%20Xpress



                                                                 %64BJIE-YjL-4-F

stormy-RSV/30



                                                                 2-4507%20Rev.%2

0B%20Pati



                                                                 ent%20Fact%20Sh

eet.pdf

 

             SARS-COV-2   [place]                                 Performed at:[facility]



             PERFORMING LAB                                        [facility]

dical



             (test code =                                        [id number] [facility]



             6754844)                                            [place], [place] 

 [address]ew:



                                                                 [phone]



CBC W/PLT COUNT &amp; AUTO [id number] 09:35:00





             Test Item    Value        Reference Range Interpretation Comments

 

             WHITE BLOOD CELL COUNT (BEAKER) 9.3 K/ L     3.5-10.5              

    



             (test code = 775)                                        

 

             RED BLOOD CELL COUNT (BEAKER) 3.70 M/ L    4.63-6.08    L          

  



             (test code = 761)                                        

 

             HEMOGLOBIN (BEAKER) (test code = 11.9 GM/DL   13.7-17.5    L       

     



             410)                                                

 

             HEMATOCRIT (BEAKER) (test code = 35.7 %       40.1-51.0    L       

     



             411)                                                

 

             MEAN CORPUSCULAR VOLUME (BEAKER) 96.5 fL      79.0-92.2    H       

     



             (test code = 753)                                        

 

             MEAN CORPUSCULAR HEMOGLOBIN 32.2 pg      25.7-32.2                 



             (BEAKER) (test code = 751)                                        

 

             MEAN CORPUSCULAR HEMOGLOBIN CONC 33.3 GM/DL   32.3-36.5            

     



             (BEAKER) (test code = 752)                                        

 

             RED CELL DISTRIBUTION WIDTH 13.2 %       11.6-14.4                 



             (BEAKER) (test code = 412)                                        

 

             PLATELET COUNT (BEAKER) (test 289 K/CU MM  150-450                 

  



             code = 756)                                         

 

             MEAN PLATELET VOLUME (BEAKER) 10.1 fL      9.4-12.4                

  



             (test code = 754)                                        

 

             NEUTROPHILS RELATIVE PERCENT 74 %                                  

 



             (BEAKER) (test code = 429)                                        

 

             LYMPHOCYTES RELATIVE PERCENT 17 %                                  

 



             (BEAKER) (test code = 430)                                        

 

             MONOCYTES RELATIVE PERCENT 7 %                                    



             (BEAKER) (test code = 431)                                        

 

             EOSINOPHILS RELATIVE PERCENT 2 %                                   

 



             (BEAKER) (test code = 432)                                        

 

             BASOPHILS RELATIVE PERCENT 0 %                                    



             (BEAKER) (test code = 437)                                        

 

             NEUTROPHILS ABSOLUTE COUNT 6.89 K/ L    1.78-5.38    H            



             (BEAKER) (test code = 670)                                        

 

             LYMPHOCYTES ABSOLUTE COUNT 1.57 K/ L    1.32-3.57                 



             (BEAKER) (test code = 414)                                        

 

             MONOCYTES ABSOLUTE COUNT (BEAKER) 0.62 K/ L    0.30-0.82           

      



             (test code = 415)                                        

 

             EOSINOPHILS ABSOLUTE COUNT 0.19 K/ L    0.04-0.54                 



             (BEAKER) (test code = 416)                                        

 

             BASOPHILS ABSOLUTE COUNT (BEAKER) 0.01 K/ L    0.01-0.08           

      



             (test code = 417)                                        

 

             IMMATURE GRANULOCYTES-RELATIVE 0 %          0-1                    

   



             PERCENT (BEAKER) (test code =                                      

  



             2801)                                               



COMPREHENSIVE METABOLIC [id number] 09:28:00





             Test Item    Value        Reference Range Interpretation Comments

 

             TOTAL PROTEIN 6.3 gm/dL    6.0-8.3                   



             (BEAKER) (test code =                                        



             770)                                                

 

             ALBUMIN (BEAKER) 4.3 g/dL     3.5-5.0                   



             (test code = 1145)                                        

 

             ALKALINE PHOSPHATASE 51 U/L                           



             (BEAKER) (test code =                                        



             346)                                                

 

             BILIRUBIN TOTAL 0.3 mg/dL    0.2-1.2                   



             (BEAKER) (test code =                                        



             377)                                                

 

             SODIUM (BEAKER) (test 134 meq/L    136-145      L            



             code = 381)                                         

 

             POTASSIUM (BEAKER) 3.6 meq/L    3.5-5.1                   



             (test code = 379)                                        

 

             CHLORIDE (BEAKER) 103 meq/L                        



             (test code = 382)                                        

 

             CO2 (BEAKER) (test 29 meq/L     22-29                     



             code = 355)                                         

 

             BLOOD UREA NITROGEN 8 mg/dL      7-21                      



             (BEAKER) (test code =                                        



             354)                                                

 

             CREATININE (BEAKER) 0.90 mg/dL   0.57-1.25                 



             (test code = 358)                                        

 

             GLUCOSE RANDOM 99 mg/dL                         



             (BEAKER) (test code =                                        



             652)                                                

 

             CALCIUM (BEAKER) 9.1 mg/dL    8.4-10.2                  



             (test code = 697)                                        

 

             AST (SGOT) (BEAKER) 30 U/L       5-34                      



             (test code = 353)                                        

 

             ALT (SGPT) (BEAKER) 27 U/L       6-55                      



             (test code = 347)                                        

 

             EGFR (BEAKER) (test 81 mL/min/1.73                           ESTIMA

MAGDA GFR IS



             code = 1092) sq m                                   NOT AS ACCURATE

 AS



                                                                 CREATININE



                                                                 CLEARANCE IN



                                                                 PREDICTING



                                                                 GLOMERULAR



                                                                 FILTRATION RATE

.



                                                                 ESTIMATED GFR I

S



                                                                 NOT APPLICABLE 

FOR



                                                                 DIALYSIS PATIEN

TS.



[id number] 09:24:00





             Test Item    Value        Reference    Interpretation Comments



                                       Range                     

 

             Protime (test code = 9.8          See_Comment                [Autom

ated



             5902-2)                                             message] The



                                                                 system which



                                                                 generated this



                                                                 result



                                                                 transmitted



                                                                 reference range

:



                                                                 9.8 - 12.0



                                                                 seconds. The



                                                                 reference range



                                                                 was not used to



                                                                 interpret this



                                                                 result as



                                                                 normal/abnormal

.

 

             INR (test code = 0.91         See_Comment                [Automated



             6301-6)                                             message] The



                                                                 system which



                                                                 generated this



                                                                 result



                                                                 transmitted



                                                                 reference range

:



                                                                 <=5.90. The



                                                                 reference range



                                                                 was not used to



                                                                 interpret this



                                                                 result as



                                                                 normal/abnormal

.

 

             PTT (test code = 25.4         See_Comment  L             [Automated



             04181-7)                                            message] The



                                                                 system which



                                                                 generated this



                                                                 result



                                                                 transmitted



                                                                 reference range

:



                                                                 25.8 - 34.5



                                                                 seconds. The



                                                                 reference range



                                                                 was not used to



                                                                 interpret this



                                                                 result as



                                                                 normal/abnormal

.

 

             PHILIPP (test code = RECOMMENDED                            



             PHILIPP)         COUMADIN/WARFARIN                           



                          INR THERAPY                            



                          RANGESSTANDARD DOSE:                           



                          2.0 - 3.0                              



                          Includes:                              



                          PROPHYLAXIS for                           



                          venous thrombosis,                           



                          systemic                               



                          embolization;                           



                          TREATMENT for venous                           



                          thrombosis and/or                           



                          pulmonary                              



                          embolus.HIGH RISK:                           



                          Target INR is                           



                          2.5-3.5 for patients                           



                          with mechanical                           



                          heart valves.                           

 

             Lab Interpretation Abnormal                               



             (test code =                                        



             53000-7)                                            



[id number]





             Test Item    Value        Reference Range Interpretation Comments

 

             PROTIME (BEAKER) (test 9.8 seconds  9.8-12.0                  



             code = 759)                                         

 

             INR (BEAKER) (test 0.91         See_Comment                [Automat

ed



             code = 370)                                         message] The sy

stem



                                                                 which generated



                                                                 this result



                                                                 transmitted



                                                                 reference range

:



                                                                 <=5.90. The



                                                                 reference range

 was



                                                                 not used to



                                                                 interpret this



                                                                 result as



                                                                 normal/abnormal

.

 

             PARTIAL THROMBOPLASTIN 25.4 seconds 25.8-34.5    L            



             TIME (BEAKER) (test                                        



             code = 760)                                         



RECOMMENDED COUMADIN/WARFARIN INR THERAPY RANGESSTANDARD DOSE: 2.0 - 3.0   
Includes: PROPHYLAXIS forvenous thrombosis, systemic embolization; TREATMENT for
venous thrombosis and/or pulmonary embolus.HIGH RISK: Target INR is 2.5-3.5 for 
patients with mechanical heart valves.TISSUE [id number] 17:37:00Surgical 
Pathology Report                         Case: [id number]                        
        Authorizing Provider:  Marcus Bazzi MD       Collected:           
2020 1020            Ordering Location:     [facility] PERIOPERATIVE         
Received:            2020 1129              SERVICES                      
                                            Pathologist:           Ricky Dasilva MD                                                       
                 Specimens:   A) -Fat Pad, ANDRÉS-VESICAL FAT                     
                                              B) - Bladder Biopsy, left bladder 
diverticulum                                       A. LYMPH NODE, LABELED "ANDRÉS-
VESICAL FAT", EXCISION:   - ONE BENIGN LYMPH NODE (0/1)B. URINARY BLADDER, LEFT 
DIVERTICULUM, OPEN PARTIAL CYSTECTOMY, DIVERTICULECTOMY:   - UROTHELIAL 
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   
  Signing Pathologist Direct Phone Line: [phone] signed by 
Ricky Dasilva MD on 2020 at  5:37 PMThe tumor invades the 
superficial fat around the diverticulum but is well clear of the margins in the 
soft tissue.  URINARY BLADDER: Cystectomy, Anterior Exenteration  (Bladder - All
Specimens)8th Edition - Protocol posted: [email or web address]   Procedure:    
Partial cystectomy TUMOR   Tumor Site:    left urinary bladder diverticulum    
Histologic Type:    Urothelial carcinoma invasive    Histologic Grade:    High-
grade    Tumor Size:Greatest Dimension (Centimeters): 4 cm     Additional 
Dimension (Centimeters):    2 cm     Additional Dimension (Centimeters):    0.7 
cm   Tumor Extension:    Tumor invades perivesical soft tissue:    
Microscopically    Lymphovascular Invasion:    Not identified    Tumor 
Configuration:    Solid / nodule MARGINS   Margins:    Uninvolved by invasive 
carcinoma and carcinoma in situ / noninvasive urothelial carcinoma LYMPH NODES  
Number of Lymph Nodes Involved:    0    Number of Lymph Nodes Examined:    1 
PATHOLOGIC STAGE CLASSIFICATION (pTNM, AJCC 8th Edition)   Primary Tumor (pT):  
 pT3a    Regional Lymph Nodes (pN):    pN0 ADDITIONAL FINDINGS   Associated 
Epithelial Lesions:    None identified    Additional Pathologic Findings:    
Inflammation / regenerative changes [id number] and postop diagnosis: 
bladder tumorProcedure: cystectomy, diverticulectomy, bladder and dissection 
lymph node pelvicA. Fat pad tissue, perivesical fat; B. Bladder biopsy left 
bladder diverticulum A.  The specimen is received in formalin labeled with the 
patient's name, accession number and "fat pad" and consists of 3 x 2 x 0.5 cm 
tan-yellow and pink fibrofatty tissue in aggregate.  No lymph node is grossly 
identified.  The specimen is submitted entirely in cassettes A1-A2.B.  The 
specimen is received fresh for intraoperative consultation diagnosis labeled 
with the patient's name, accession number, and "bladder biopsy tissue" consists 
of 5 x 4.9 x 1.2 cm tan-pink bladder diverticulum tissue with 0.6 x 0.5 cm 
opening which is inked blue.  The outer surface is tan-pink, hyperemic and 
ragged with no grossly visible tumor.  The specimen is oriented per surgeon and 
inked.  Frozen section is performed.  Upon opening there is a 4 x 2 cm tan-
brown, necrotic tumor present. The tumor is tan-pink and brown.  Itis located 
0.4 cm from nearest margin (diverticulum neck, inked blue).  The specimen is 
cross sectioned, the tumor is 0.7 cm thick and abuts the inked outer fatty 
tissue.  The tumor is located 2 cm from the anterior wall, 1.2 cm to the 
posterior wall, 2.2 cm to proximal wall, and abuts the distal wall.  
Representative sections are submitted.Ink code:  Blue-surgical margin 
(diverticulum neck).  Black-anterior wall.  Orange-posterior wall.Green-proximal
wall Violet-distal wall, next to the marginBlue-distal wallSection code:  FSB1 -
Diverticulum neck margin, en face;  B2-B6 bisected tumor to diverticulum neck 
margin, perpendicular sections (B4-B5 area closest to the margin).  B7 
diverticulum neck margin, remaining perpendicular sections.  B8-B12 remaining 
tumor, serially sectioned submitted entirely; B13 representative sections of 
normal mucosa, anterior and posterior wall.  B14 representative section of 
normal mucosa, proximal wall.  HS/bc FSA1, BLADDER DIVERTICULUM, EXCISION:  - 
NECK MARGIN, NEGATIVE FOR MALIGNANCY   Results reported to Dr. Bazzi at 11:52 
a.m., by Dr. Ledesma on 2020. Performed.BASIC METABOLIC [id number] 
07:10:00





             Test Item    Value        Reference Range Interpretation Comments

 

             SODIUM (BEAKER) 136 meq/L    136-145                   



             (test code = 381)                                        

 

             POTASSIUM (BEAKER) 3.3 meq/L    3.5-5.1      L            



             (test code = 379)                                        

 

             CHLORIDE (BEAKER) 105 meq/L                        



             (test code = 382)                                        

 

             CO2 (BEAKER) (test 25 meq/L     22-29                     



             code = 355)                                         

 

             BLOOD UREA NITROGEN 5 mg/dL      7-21         L            



             (BEAKER) (test code                                        



             = 354)                                              

 

             CREATININE (BEAKER) 0.72 mg/dL   0.57-1.25                 



             (test code = 358)                                        

 

             GLUCOSE RANDOM 122 mg/dL           H            



             (BEAKER) (test code                                        



             = 652)                                              

 

             CALCIUM (BEAKER) 9.0 mg/dL    8.4-10.2                  



             (test code = 697)                                        

 

             EGFR (BEAKER) (test 106 mL/min/1.73                           ESTIM

ATED GFR IS



             code = 1092) sq m                                   NOT AS ACCURATE

 AS



                                                                 CREATININE



                                                                 CLEARANCE IN



                                                                 PREDICTING



                                                                 GLOMERULAR



                                                                 FILTRATION RATE

.



                                                                 ESTIMATED GFR I

S



                                                                 NOT APPLICABLE 

FOR



                                                                 DIALYSIS PATIEN

TS.



 ID - PIAYA [facility] (HEMOGRAM ONLY)2020 04:54:00





             Test Item    Value        Reference Range Interpretation Comments

 

             WHITE BLOOD CELL COUNT (BEAKER) 8.6 K/ L     3.5-10.5              

    



             (test code = 775)                                        

 

             RED BLOOD CELL COUNT (BEAKER) 3.47 M/ L    4.63-6.08    L          

  



             (test code = 761)                                        

 

             HEMOGLOBIN (BEAKER) (test code = 11.7 GM/DL   13.7-17.5    L       

     



             410)                                                

 

             HEMATOCRIT (BEAKER) (test code = 34.4 %       40.1-51.0    L       

     



             411)                                                

 

             MEAN CORPUSCULAR VOLUME (BEAKER) 99.1 fL      79.0-92.2    H       

     



             (test code = 753)                                        

 

             MEAN CORPUSCULAR HEMOGLOBIN 33.7 pg      25.7-32.2    H            



             (BEAKER) (test code = 751)                                        

 

             MEAN CORPUSCULAR HEMOGLOBIN CONC 34.0 GM/DL   32.3-36.5            

     



             (BEAKER) (test code = 752)                                        

 

             RED CELL DISTRIBUTION WIDTH 12.9 %       11.6-14.4                 



             (BEAKER) (test code = 412)                                        

 

             PLATELET COUNT (BEAKER) (test 141 K/CU MM  150-450      L          

  



             code = 756)                                         

 

             MEAN PLATELET VOLUME (BEAKER) 10.7 fL      9.4-12.4                

  



             (test code = 754)                                        

 

             NUCLEATED RED BLOOD CELLS 0 /100 WBC   0-0                       



             (BEAKER) (test code = 413)                                        



BASIC METABOLIC [id number] 05:59:00





             Test Item    Value        Reference Range Interpretation Comments

 

             SODIUM (BEAKER) 132 meq/L    136-145      L            



             (test code = 381)                                        

 

             POTASSIUM (BEAKER) 3.8 meq/L    3.5-5.1                   Specimen 

slightly



             (test code = 379)                                        hemolyzed

 

             CHLORIDE (BEAKER) 105 meq/L                        



             (test code = 382)                                        

 

             CO2 (BEAKER) (test 21 meq/L     22-29        L            



             code = 355)                                         

 

             BLOOD UREA NITROGEN 5 mg/dL      7-21         L            



             (BEAKER) (test code                                        



             = 354)                                              

 

             CREATININE (BEAKER) 0.72 mg/dL   0.57-1.25                 Specimen

 slightly



             (test code = 358)                                        hemolyzed

 

             GLUCOSE RANDOM 127 mg/dL           H            



             (BEAKER) (test code                                        



             = 652)                                              

 

             CALCIUM (BEAKER) 8.4 mg/dL    8.4-10.2                  



             (test code = 697)                                        

 

             EGFR (BEAKER) (test 106 mL/min/1.73                           ESTIM

ATED GFR IS



             code = 1092) sq m                                   NOT AS ACCURATE

 AS



                                                                 CREATININE



                                                                 CLEARANCE IN



                                                                 PREDICTING



                                                                 GLOMERULAR



                                                                 FILTRATION RATE

.



                                                                 ESTIMATED GFR I

S



                                                                 NOT APPLICABLE 

FOR



                                                                 DIALYSIS PATIEN

TS.



 ID - PIAYA [facility]BC (HEMOGRAM ONLY)2020 04:53:00





             Test Item    Value        Reference Range Interpretation Comments

 

             WHITE BLOOD CELL COUNT (BEAKER) 10.2 K/ L    3.5-10.5              

    



             (test code = 775)                                        

 

             RED BLOOD CELL COUNT (BEAKER) 3.33 M/ L    4.63-6.08    L          

  



             (test code = 761)                                        

 

             HEMOGLOBIN (BEAKER) (test code = 11.3 GM/DL   13.7-17.5    L       

     



             410)                                                

 

             HEMATOCRIT (BEAKER) (test code = 33.2 %       40.1-51.0    L       

     



             411)                                                

 

             MEAN CORPUSCULAR VOLUME (BEAKER) 99.7 fL      79.0-92.2    H       

     



             (test code = 753)                                        

 

             MEAN CORPUSCULAR HEMOGLOBIN 33.9 pg      25.7-32.2    H            



             (BEAKER) (test code = 751)                                        

 

             MEAN CORPUSCULAR HEMOGLOBIN CONC 34.0 GM/DL   32.3-36.5            

     



             (BEAKER) (test code = 752)                                        

 

             RED CELL DISTRIBUTION WIDTH 13.1 %       11.6-14.4                 



             (BEAKER) (test code = 412)                                        

 

             PLATELET COUNT (BEAKER) (test 119 K/CU MM  150-450      L          

  



             code = 756)                                         

 

             MEAN PLATELET VOLUME (BEAKER) 10.7 fL      9.4-12.4                

  



             (test code = 754)                                        

 

             NUCLEATED RED BLOOD CELLS 0 /100 WBC   0-0                       



             (BEAKER) (test code = 413)                                        



BASIC METABOLIC [id number] 05:50:00





             Test Item    Value        Reference Range Interpretation Comments

 

             SODIUM (BEAKER) 133 meq/L    136-145      L            



             (test code = 381)                                        

 

             POTASSIUM (BEAKER) 3.7 meq/L    3.5-5.1                   



             (test code = 379)                                        

 

             CHLORIDE (BEAKER) 105 meq/L                        



             (test code = 382)                                        

 

             CO2 (BEAKER) (test 22 meq/L     22-29                     



             code = 355)                                         

 

             BLOOD UREA NITROGEN 7 mg/dL      7-21                      



             (BEAKER) (test code                                        



             = 354)                                              

 

             CREATININE (BEAKER) 0.82 mg/dL   0.57-1.25                 



             (test code = 358)                                        

 

             GLUCOSE RANDOM 134 mg/dL           H            



             (BEAKER) (test code                                        



             = 652)                                              

 

             CALCIUM (BEAKER) 8.3 mg/dL    8.4-10.2     L            



             (test code = 697)                                        

 

             EGFR (BEAKER) (test 91 mL/min/1.73                           ESTIMA

MAGDA GFR IS



             code = 1092) sq m                                   NOT AS ACCURATE

 AS



                                                                 CREATININE



                                                                 CLEARANCE IN



                                                                 PREDICTING



                                                                 GLOMERULAR



                                                                 FILTRATION RATE

.



                                                                 ESTIMATED GFR I

S



                                                                 NOT APPLICABLE 

FOR



                                                                 DIALYSIS PATIEN

TS.



 ID - OTILIA [facility] (HEMOGRAM ONLY)2020 05:22:00





             Test Item    Value        Reference Range Interpretation Comments

 

             WHITE BLOOD CELL COUNT (BEAKER) 10.1 K/ L    3.5-10.5              

    



             (test code = 775)                                        

 

             RED BLOOD CELL COUNT (BEAKER) 3.40 M/ L    4.63-6.08    L          

  



             (test code = 761)                                        

 

             HEMOGLOBIN (BEAKER) (test code = 11.8 GM/DL   13.7-17.5    L       

     



             410)                                                

 

             HEMATOCRIT (BEAKER) (test code = 34.1 %       40.1-51.0    L       

     



             411)                                                

 

             MEAN CORPUSCULAR VOLUME (BEAKER) 100.3 fL     79.0-92.2    H       

     



             (test code = 753)                                        

 

             MEAN CORPUSCULAR HEMOGLOBIN 34.7 pg      25.7-32.2    H            



             (BEAKER) (test code = 751)                                        

 

             MEAN CORPUSCULAR HEMOGLOBIN CONC 34.6 GM/DL   32.3-36.5            

     



             (BEAKER) (test code = 752)                                        

 

             RED CELL DISTRIBUTION WIDTH 13.4 %       11.6-14.4                 



             (BEAKER) (test code = 412)                                        

 

             PLATELET COUNT (BEAKER) (test 122 K/CU MM  150-450      L          

  



             code = 756)                                         

 

             MEAN PLATELET VOLUME (BEAKER) 11.1 fL      9.4-12.4                

  



             (test code = 754)                                        

 

             NUCLEATED RED BLOOD CELLS 0 /100 WBC   0-0                       



             (BEAKER) (test code = 413)                                        



BASIC METABOLIC [id number] 14:18:00





             Test Item    Value        Reference Range Interpretation Comments

 

             SODIUM (BEAKER) 139 meq/L    136-145                   



             (test code = 381)                                        

 

             POTASSIUM (BEAKER) 4.0 meq/L    3.5-5.1                   



             (test code = 379)                                        

 

             CHLORIDE (BEAKER) 110 meq/L           H            



             (test code = 382)                                        

 

             CO2 (BEAKER) (test 22 meq/L     22-29                     



             code = 355)                                         

 

             BLOOD UREA NITROGEN 9 mg/dL      7-21                      



             (BEAKER) (test code                                        



             = 354)                                              

 

             CREATININE (BEAKER) 0.80 mg/dL   0.57-1.25                 



             (test code = 358)                                        

 

             GLUCOSE RANDOM 111 mg/dL           H            



             (BEAKER) (test code                                        



             = 652)                                              

 

             CALCIUM (BEAKER) 8.1 mg/dL    8.4-10.2     L            



             (test code = 697)                                        

 

             EGFR (BEAKER) (test 93 mL/min/1.73                           ESTIMA

MAGDA GFR IS



             code = 1092) sq m                                   NOT AS ACCURATE

 AS



                                                                 CREATININE



                                                                 CLEARANCE IN



                                                                 PREDICTING



                                                                 GLOMERULAR



                                                                 FILTRATION RATE

.



                                                                 ESTIMATED GFR I

S



                                                                 NOT APPLICABLE 

FOR



                                                                 DIALYSIS PATIEN

TS.



 ID - CANDI [facility] (HEMOGRAM ONLY)2020 14:04:00





             Test Item    Value        Reference Range Interpretation Comments

 

             WHITE BLOOD CELL COUNT (BEAKER) 10.6 K/ L    3.5-10.5     H        

    



             (test code = 775)                                        

 

             RED BLOOD CELL COUNT (BEAKER) 3.54 M/ L    4.63-6.08    L          

  



             (test code = 761)                                        

 

             HEMOGLOBIN (BEAKER) (test code = 12.2 GM/DL   13.7-17.5    L       

     



             410)                                                

 

             HEMATOCRIT (BEAKER) (test code = 35.8 %       40.1-51.0    L       

     



             411)                                                

 

             MEAN CORPUSCULAR VOLUME (BEAKER) 101.1 fL     79.0-92.2    H       

     



             (test code = 753)                                        

 

             MEAN CORPUSCULAR HEMOGLOBIN 34.5 pg      25.7-32.2    H            



             (BEAKER) (test code = 751)                                        

 

             MEAN CORPUSCULAR HEMOGLOBIN CONC 34.1 GM/DL   32.3-36.5            

     



             (BEAKER) (test code = 752)                                        

 

             RED CELL DISTRIBUTION WIDTH 13.2 %       11.6-14.4                 



             (BEAKER) (test code = 412)                                        

 

             PLATELET COUNT (BEAKER) (test 127 K/CU MM  150-450      L          

  



             code = 756)                                         

 

             MEAN PLATELET VOLUME (BEAKER) 10.5 fL      9.4-12.4                

  



             (test code = 754)                                        

 

             NUCLEATED RED BLOOD CELLS 0 /100 WBC   0-0                       



             (BEAKER) (test code = 413)                                        



BASIC METABOLIC [id number] 06:33:00





             Test Item    Value        Reference Range Interpretation Comments

 

             SODIUM (BEAKER) 139 meq/L    136-145                   



             (test code = 381)                                        

 

             POTASSIUM (BEAKER) 4.1 meq/L    3.5-5.1                   



             (test code = 379)                                        

 

             CHLORIDE (BEAKER) 107 meq/L                        



             (test code = 382)                                        

 

             CO2 (BEAKER) (test 21 meq/L     22-29        L            



             code = 355)                                         

 

             BLOOD UREA NITROGEN 10 mg/dL     7-21                      



             (BEAKER) (test code                                        



             = 354)                                              

 

             CREATININE (BEAKER) 0.87 mg/dL   0.57-1.25                 



             (test code = 358)                                        

 

             GLUCOSE RANDOM 109 mg/dL           H            



             (BEAKER) (test code                                        



             = 652)                                              

 

             CALCIUM (BEAKER) 9.5 mg/dL    8.4-10.2                  



             (test code = 697)                                        

 

             EGFR (BEAKER) (test 85 mL/min/1.73                           ESTIMA

MAGDA GFR IS



             code = 1092) sq m                                   NOT AS ACCURATE

 AS



                                                                 CREATININE



                                                                 CLEARANCE IN



                                                                 PREDICTING



                                                                 GLOMERULAR



                                                                 FILTRATION RATE

.



                                                                 ESTIMATED GFR I

S



                                                                 NOT APPLICABLE 

FOR



                                                                 DIALYSIS PATIEN

TS.



 ID - OTILIA MPOCT-HEMOGLOBIN [id number] 06:04:00





             Test Item    Value        Reference Range Interpretation Comments

 

             POC-HEMOGLOBIN METER 13.9 g/dL    13.0-16.8                 TESTED 

AT [facility] [address]



             (BEAKER) (test code =                                        [address] [place]



             1539)                                               [address]



TISSUE [id number] 16:32:00Surgical Pathology Report                         
Case: [id number]                                 Authorizing Provider:  Marcus Bazzi MD       Collected:           [id number]            Ordering 
Location:     [facility] PERIOPER[facility]   Received:            2020 0963   
          SERVICES                                                              
    Pathologist:           Ricky Dasilva MD                   
                                                     Specimen:    Other, LEFT 
DIVERTICULAR TUMOR RE-RESECTION                                          A. 
URINARY BLADDER, LEFT DIVERTICULAR TUMOR RE-RESECTION, TURBT:    - UROTHELIAL 
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
PROPRIA IS NOT PRESENT         Signing Pathologist Direct Phone Line: 
[phone] signed by Ricky Dasilva MD on 2020 at
 4:32 PMEssentially 100% of the tumor is invasive.A. [id number].Preop diagnosis:  
Bladder tumor, incomplete bladder emptying A. Other Received in formalin labeled
with the patient's name, accession number and "left diverticular tumor re-
resection" is a 1.6 x 1.5 x 0.3 cm aggregate of tan-pink rubbery tissue which is
filtered and submitted in toto in A1.  PA/pl A, Performed.[id number]
11:17:00





             Test Item    Value        Reference Range Interpretation Comments

 

             SODIUM (BEAKER) (test 141 meq/L    136-145                   



             code = 381)                                         

 

             POTASSIUM (BEAKER) 4.0 meq/L    3.5-5.1                   Specimen 

slightly



             (test code = 379)                                        hemolyzed

 

             CHLORIDE (BEAKER) 109 meq/L           H            



             (test code = 382)                                        

 

             CO2 (BEAKER) (test 26 meq/L     22-29                     



             code = 355)                                         



 ID - LABUN AND [id number] 11:17:00





             Test Item    Value        Reference Range Interpretation Comments

 

             BLOOD UREA NITROGEN 10 mg/dL     7-21                      



             (BEAKER) (test code                                        



             = 354)                                              

 

             CREATININE (BEAKER) 0.82 mg/dL   0.57-1.25                 Specimen

 slightly



             (test code = 358)                                        hemolyzed

 

             EGFR (BEAKER) (test 91 mL/min/1.73                           ESTIMA

MAGDA GFR IS



             code = 1092) sq m                                   NOT AS ACCURATE

 AS



                                                                 CREATININE



                                                                 CLEARANCE IN



                                                                 PREDICTING



                                                                 GLOMERULAR



                                                                 FILTRATION RATE

.



                                                                 ESTIMATED GFR I

S



                                                                 NOT APPLICABLE 

FOR



                                                                 DIALYSIS PATIEN

TS.



 ID - LAPOCT-GLUCOSE [id number] 11:07:00





             Test Item    Value        Reference Range Interpretation Comments

 

             POC-GLUCOSE METER 93 mg/dL                         : TESTED A

[facility]



             (BEAKER) (test code =                                        [place],



             1538)                                               [address]:



                                                                 /Techni

albert ID =



                                                                 [id number] for EDWA

RDS,



                                                                 TERESSA



HEMOGLOBIN AND [id number] 11:04:00





             Test Item    Value        Reference Range Interpretation Comments

 

             HEMOGLOBIN (BEAKER) (test code = 14.9 GM/DL   13.7-17.5            

     



             410)                                                

 

             HEMATOCRIT (EVIE) (test code = 42.8 %       40.1-51.0            

     



             411)                                                



 ID - [id number]

## 2021-03-13 NOTE — ER
Nurse's Notes                                                                                     

 [facility]                                                                 

Name: Beverly Santacruz                                                                                 

Age: 81 yrs                                                                                       

Sex: Male                                                                                         

: 1940                                                                                   

MRN: [id number]                                                                                   

Arrival Date: 2021                                                                          

Time: 09:10                                                                                       

Account#: [id number]                                                                            

Bed 19                                                                                            

Private MD:                                                                                       

Diagnosis: Hematuria                                                                              

                                                                                                  

Presentation:                                                                                     

                                                                                             

10:01 Chief complaint: Patient states: Started having blood in his urine today. Has been      ll1 

      weak, and not feeling well for the past 3-4 days. No fever. Received his first dose of      

      chemo for bladder CA this past week. Coronavirus screen: Client denies travel out of        

      the [place] in the last 14 days. At this time, the client does not indicate any symptoms       

      associated with coronavirus-19. Ebola Screen: Patient denies travel to an                   

      Ebola-affected area in the 21 days before illness onset. No acute neurological deficit      

      is noted. Initial Sepsis Screen: Does the patient meet any 2 criteria? No. Patient's        

      initial sepsis screen is negative. Does the patient have a suspected source of              

      infection? Yes: Dysuria/Frequency/Urgency/UTI. Risk Assessment: Do you want to hurt         

      yourself or someone else? Patient reports no desire to harm self or others. Onset of        

      symptoms was March 10, 2021.                                                                

10:01 Method Of Arrival: Ambulatory                                                           ll1 

10:01 Acuity: NAOMI 3                                                                           ll1 

                                                                                                  

Stroke Activation: Symptom onset > 6 hours                                                        

 Physician: Stroke Attending; Name: ; Notified At: ; Arrived At:                                  

 Physician: Chief Stroke Resident; Name: ; Notified At: ; Arrived At:                             

 Physician: Stroke Resident; Name: ; Notified At: ; Arrived At:                                   

 Physician: ED Attending; Name: ; Notified At: ; Arrived At:                                      

 Physician: ED Resident; Name: ; Notified At: ; Arrived At:                                       

10:01 n/a                                                                                     ll1 

                                                                                                  

Historical:                                                                                       

- Allergies:                                                                                      

10:00 No Known Allergies;                                                                     ll1 

- PMHx:                                                                                           

10:00 Gastric Reflux; Myocardial infarction; Bladder cancer; Hypertension;                    ll1 

- PSHx:                                                                                           

10:00 facial; Knee surgery; back; Heart stents;                                               ll1 

                                                                                                  

- Immunization history:: Client reports receiving the 2nd dose of the Covid vaccine,              

  Flu vaccine is up to date.                                                                      

- Social history:: Smoking status: Patient reports the use of cigarette tobacco                   

  products, denies chronic smoking, but will smoke occasionally, trying to quit, uses             

  nicorette..                                                                                     

                                                                                                  

                                                                                                  

Screening:                                                                                        

10:03 Abuse screen: Denies threats or abuse. Nutritional screening: No deficits noted.        bw  

      Tuberculosis screening: No symptoms or risk factors identified. Fall Risk None              

      identified.                                                                                 

                                                                                                  

Assessment:                                                                                       

10:03 General: Appears in no apparent distress. uncomfortable, Behavior is calm, cooperative, bw  

      appropriate for age. Pain: Complains of pain in suprapubic area. Neuro: No deficits         

      noted. Cardiovascular: No deficits noted. Respiratory: No deficits noted. GI: No            

      deficits noted. : Reports burning with urination, urgency, urinary frequency, blood       

      in urine. EENT: No deficits noted. Derm: No deficits noted. Musculoskeletal: No             

      deficits noted.                                                                             

11:08 Reassessment: Patient appears in no apparent distress at this time. No changes from     bw  

      previously documented assessment. Patient and/or family updated on plan of care and         

      expected duration. Pain level reassessed. Patient is alert, oriented x 3, equal             

      unlabored respirations, skin warm/dry/pink.                                                 

12:03 Reassessment: Patient appears in no apparent distress at this time. No changes from     bw  

      previously documented assessment. Patient and/or family updated on plan of care and         

      expected duration. Pain level reassessed. Patient is alert, oriented x 3, equal             

      unlabored respirations, skin warm/dry/pink. waiting for correct size 3 way cordon.           

13:05 Reassessment: Patient appears in no apparent distress at this time. No changes from     bw  

      previously documented assessment. Patient and/or family updated on plan of care and         

      expected duration. Pain level reassessed. Patient is alert, oriented x 3, equal             

      unlabored respirations, skin warm/dry/pink.                                                 

14:05 Reassessment: Patient appears in no apparent distress at this time.                     bw  

                                                                                                  

Vital Signs:                                                                                      

10:01  / 62; Pulse 89; Resp 17; Temp 98.4; Pulse Ox 99% ; Weight 66.68 kg; Height 5 ft. ll1 

      8 in. (172.72 cm); Pain 0/10;                                                               

10:03  / 59; Pulse 79; Resp 18; Pulse Ox 99% ; Pain 6/10;                               bw  

11:08  / 62; Pulse 70; Resp 20; Pulse Ox 98% ;                                          bw  

12:03  / 64; Pulse 77; Resp 18; Pulse Ox 99% on R/A;                                    bw  

13:47  / 74; Pulse 71; Resp 20; Pulse Ox 99% ;                                          bw  

10:01 Body Mass Index 22.35 (66.68 kg, 172.72 cm)                                             1 

                                                                                                  

ED Course:                                                                                        

09:10 Patient arrived in ED.                                                                  ds1 

09:59 Rudy Machado MD is Attending Physician.                                            tw4 

09:59 Arm band placed on Patient placed in an exam room, on a stretcher.                      ll1 

10:03 Romina Beltran, RN is Primary Nurse.                                                     bw  

10:03 Patient has correct armband on for positive identification. Call light in reach. Side   bw  

      rails up X 1. Pulse ox on. NIBP on.                                                         

10:03 No provider procedures requiring assistance completed.                                  bw  

10:04 Triage completed.                                                                       ll1 

10:08 Initial lab(s) drawn, by me, sent to lab. Inserted saline lock: 20 gauge in left        kj1 

      antecubital area, using aseptic technique. Blood collected.                                 

14:05 IV discontinued, intact, bleeding controlled, Pressure dressing applied.                bw  

                                                                                                  

Administered Medications:                                                                         

12:35 Drug: morphine 4 mg Route: IVP; Site: left antecubital;                                 bw  

14:25 Follow up: Response: No adverse reaction; Pain is decreased                             ss  

12:35 Drug: Zofran (Ondansetron) 4 mg Route: IVP; Site: left antecubital;                     bw  

14:24 Follow up: Response: No adverse reaction                                                  

14:24 Not Given (Patient Refused): Ditropan 5 mg PO once                                      ss  

                                                                                                  

                                                                                                  

Outcome:                                                                                          

13:44 Discharge ordered by MD.                                                                tw4 

14:05 Discharged to home ambulatory.                                                          bw  

14:05 Condition: stable                                                                           

14:05 Discharge instructions given to patient, Instructed on pt instructed to follow up with      

      urology. urine became clear after irrigation but started to darken before discharge. Pt     

      agreed to watch for worsening symptoms and return if bleeding worsens.                      

14:31 Patient left the ED.                                                                    ss  

                                                                                                  

Signatures:                                                                                       

Venita Garcia                                ds1                                                  

Brittaney Harrison RN                      RN                                                      

Rudy Machado MD MD   [facility]                                                  

Debra Norris                              kj1                                                  

Andrew Zimmerman RN RN   [facility]                                                  

Romina Beltran RN RN                                                      

                                                                                                  

**************************************************************************************************

## 2021-10-03 ENCOUNTER — HOSPITAL ENCOUNTER (INPATIENT)
Dept: HOSPITAL 97 - ER | Age: 81
LOS: 4 days | Discharge: HOSPICE | DRG: 195 | End: 2021-10-07
Attending: INTERNAL MEDICINE | Admitting: INTERNAL MEDICINE
Payer: COMMERCIAL

## 2021-10-03 VITALS — BODY MASS INDEX: 19.3 KG/M2

## 2021-10-03 DIAGNOSIS — K21.9: ICD-10-CM

## 2021-10-03 DIAGNOSIS — J18.9: Primary | ICD-10-CM

## 2021-10-03 DIAGNOSIS — S20.219A: ICD-10-CM

## 2021-10-03 DIAGNOSIS — N40.0: ICD-10-CM

## 2021-10-03 DIAGNOSIS — R45.1: ICD-10-CM

## 2021-10-03 DIAGNOSIS — D69.59: ICD-10-CM
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
## 2021-10-03 DIAGNOSIS — I25.2: ICD-10-CM
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
## 2021-10-03 DIAGNOSIS — T45.1X5A: ICD-10-CM

## 2021-10-03 DIAGNOSIS — Z20.822: ICD-10-CM

## 2021-10-03 DIAGNOSIS — C67.9: ICD-10-CM

## 2021-10-03 DIAGNOSIS — I10: ICD-10-CM

## 2021-10-03 DIAGNOSIS — Z23: ICD-10-CM

## 2021-10-03 DIAGNOSIS — E78.5: ICD-10-CM

## 2021-10-03 DIAGNOSIS — E86.9: ICD-10-CM

## 2021-10-03 DIAGNOSIS — D64.9: ICD-10-CM

## 2021-10-03 DIAGNOSIS — E87.6: ICD-10-CM

## 2021-10-03 DIAGNOSIS — Z95.0: ICD-10-CM

## 2021-10-03 LAB
ALBUMIN SERPL BCP-MCNC: 1.5 G/DL (ref 3.4–5)
ALP SERPL-CCNC: 164 U/L (ref 45–117)
ALT SERPL W P-5'-P-CCNC: 23 U/L (ref 12–78)
ANISOCYTOSIS BLD QL: (no result)
AST SERPL W P-5'-P-CCNC: 42 U/L (ref 15–37)
BLD SMEAR INTERP: (no result)
BUN BLD-MCNC: 31 MG/DL (ref 7–18)
CKMB CREATINE KINASE MB: 1.5 NG/ML (ref 1–3.6)
COHGB MFR BLDA: 3.8 % (ref 0–1.5)
GLUCOSE SERPLBLD-MCNC: 119 MG/DL (ref 74–106)
HCT VFR BLD CALC: 17.9 % (ref 39.6–49)
INR BLD: 1.44
LIPASE SERPL-CCNC: 19 U/L (ref 73–393)
LYMPHOCYTES # SPEC AUTO: 0.3 K/UL (ref 0.7–4.9)
MAGNESIUM SERPL-MCNC: 1.8 MG/DL (ref 1.8–2.4)
MORPHOLOGY BLD-IMP: (no result)
NT-PROBNP SERPL-MCNC: (no result) PG/ML (ref ?–450)
OXYHGB MFR BLDA: 92 % (ref 94–97)
PMV BLD: 10.1 FL (ref 7.6–11.3)
POIKILOCYTOSIS BLD QL SMEAR: (no result)
POTASSIUM SERPL-SCNC: 2.3 MMOL/L (ref 3.5–5.1)
RBC # BLD: 1.96 M/UL (ref 4.33–5.43)
SAO2 % BLDA: 96.6 % (ref 92–98.5)
TROPONIN (EMERG DEPT USE ONLY): 0.02 NG/ML (ref 0–0.04)

## 2021-10-03 PROCEDURE — 94760 N-INVAS EAR/PLS OXIMETRY 1: CPT

## 2021-10-03 PROCEDURE — 70450 CT HEAD/BRAIN W/O DYE: CPT

## 2021-10-03 PROCEDURE — 96374 THER/PROPH/DIAG INJ IV PUSH: CPT

## 2021-10-03 PROCEDURE — 84484 ASSAY OF TROPONIN QUANT: CPT

## 2021-10-03 PROCEDURE — 83735 ASSAY OF MAGNESIUM: CPT

## 2021-10-03 PROCEDURE — 82550 ASSAY OF CK (CPK): CPT

## 2021-10-03 PROCEDURE — 84132 ASSAY OF SERUM POTASSIUM: CPT

## 2021-10-03 PROCEDURE — 99285 EMERGENCY DEPT VISIT HI MDM: CPT

## 2021-10-03 PROCEDURE — 87086 URINE CULTURE/COLONY COUNT: CPT

## 2021-10-03 PROCEDURE — 71260 CT THORAX DX C+: CPT

## 2021-10-03 PROCEDURE — 80048 BASIC METABOLIC PNL TOTAL CA: CPT

## 2021-10-03 PROCEDURE — 72170 X-RAY EXAM OF PELVIS: CPT

## 2021-10-03 PROCEDURE — 82805 BLOOD GASES W/O2 SATURATION: CPT

## 2021-10-03 PROCEDURE — 82565 ASSAY OF CREATININE: CPT

## 2021-10-03 PROCEDURE — 87040 BLOOD CULTURE FOR BACTERIA: CPT

## 2021-10-03 PROCEDURE — 90471 IMMUNIZATION ADMIN: CPT

## 2021-10-03 PROCEDURE — 30233N1 TRANSFUSION OF NONAUTOLOGOUS RED BLOOD CELLS INTO PERIPHERAL VEIN, PERCUTANEOUS APPROACH: ICD-10-PCS

## 2021-10-03 PROCEDURE — 80076 HEPATIC FUNCTION PANEL: CPT

## 2021-10-03 PROCEDURE — 93005 ELECTROCARDIOGRAM TRACING: CPT

## 2021-10-03 PROCEDURE — 81003 URINALYSIS AUTO W/O SCOPE: CPT

## 2021-10-03 PROCEDURE — 83690 ASSAY OF LIPASE: CPT

## 2021-10-03 PROCEDURE — 83605 ASSAY OF LACTIC ACID: CPT

## 2021-10-03 PROCEDURE — 85610 PROTHROMBIN TIME: CPT

## 2021-10-03 PROCEDURE — 87088 URINE BACTERIA CULTURE: CPT

## 2021-10-03 PROCEDURE — 82947 ASSAY GLUCOSE BLOOD QUANT: CPT

## 2021-10-03 PROCEDURE — 85025 COMPLETE CBC W/AUTO DIFF WBC: CPT

## 2021-10-03 PROCEDURE — 72125 CT NECK SPINE W/O DYE: CPT

## 2021-10-03 PROCEDURE — 90732 PPSV23 VACC 2 YRS+ SUBQ/IM: CPT

## 2021-10-03 PROCEDURE — 86901 BLOOD TYPING SEROLOGIC RH(D): CPT

## 2021-10-03 PROCEDURE — 86900 BLOOD TYPING SEROLOGIC ABO: CPT

## 2021-10-03 PROCEDURE — 83880 ASSAY OF NATRIURETIC PEPTIDE: CPT

## 2021-10-03 PROCEDURE — 84100 ASSAY OF PHOSPHORUS: CPT

## 2021-10-03 PROCEDURE — 86850 RBC ANTIBODY SCREEN: CPT

## 2021-10-03 PROCEDURE — 82553 CREATINE MB FRACTION: CPT

## 2021-10-03 PROCEDURE — 74177 CT ABD & PELVIS W/CONTRAST: CPT

## 2021-10-03 PROCEDURE — 96375 TX/PRO/DX INJ NEW DRUG ADDON: CPT

## 2021-10-03 PROCEDURE — 71045 X-RAY EXAM CHEST 1 VIEW: CPT

## 2021-10-03 PROCEDURE — 36415 COLL VENOUS BLD VENIPUNCTURE: CPT

## 2021-10-03 RX ADMIN — SODIUM CHLORIDE AND POTASSIUM CHLORIDE SCH: .9; .15 SOLUTION INTRAVENOUS at 17:06

## 2021-10-03 RX ADMIN — Medication SCH ML: at 21:43

## 2021-10-03 RX ADMIN — HYDROCODONE BITARTRATE AND ACETAMINOPHEN PRN TAB: 10; 325 TABLET ORAL at 21:43

## 2021-10-03 NOTE — RAD REPORT
EXAM DESCRIPTION:  CT - Head C  Spine Cap W Con - 10/3/2021 1:47 pm

 

CLINICAL HISTORY:  Trauma, head and neck injury.  Chest, abdomen and pelvis pain.

Pain;Weakness

 

COMPARISON:  [facility] dated 2/22/2021

 

TECHNIQUE:  CT head without contrast.

 

CT cervical spine without contrast with coronal and sagittal reformatted images.

 

CT chest, abdomen and pelvis with IV contrast (approximately 100 mL nonionic IV contrast) with corona
l and sagittal reformatted images of the spine.

 

All CT scans are performed using dose optimization technique as appropriate and may include automated
 exposure control or mA/KV adjustment according to patient size.

 

FINDINGS:  CT HEAD WITHOUT CONTRAST:

 

No intracranial hemorrhage, hydrocephalus or extra-axial fluid collection.  No areas of brain edema o
r midline shift. Cerebral atrophy.

 

Mucosal thickening in the right maxillary sinus. The calvarium is intact.

 

 

CT CERVICAL SPINE WITHOUT CONTRAST:

 

No fracture or subluxation.  The prevertebral soft tissues are normal in thickness.Multilevel cervica
l spondylosis with varying degrees of neural foraminal narrowing. There is partial osseous fusion at 
C3-4 which is likely developmental.

 

 

CT CHEST, ABDOMEN, PELVIS WITH CONTRAST:

 

Multiple pulmonary nodules are noted. A couple calcified but the majority are noncalcified and suspic
ious for metastatic disease. The largest in the right side measures 8 millimeters. The largest on the
 left is in the left upper lobe and is somewhat more regular measuring 6 millimeters. Paraseptal emph
ysema is present. Small right pleural effusion. This is at least partially loculated. Right upper khurram
st wall Port-A-Cath. Left upper chest wall pacemaker.

 

Multiple new liver lesions, the largest in the left hepatic lobe measuring 5.5 cm. The largest in the
 right hepatic lobe measures approximately 3.3 cm. Irregular mass along the left aspect of the bladde
r which is increased in size now measuring 4.7 cm, previously 3.2 cm. Diverticulosis without divertic
ulitis. No hydronephrosis or suspicious renal masses. The pancreas is unremarkable. The spleen is unr
emarkable. Small volume of free fluid is noted. Multiple bladder diverticulum noted. Slight compressi
on deformities in the thoracic spine without evidence of acute fracture. Multilevel degenerative prasad
ges are present in the spine.

 

IMPRESSION:  No acute intracranial abnormality or cervical spine fracture.

 

No evidence of significant trauma to the chest, abdomen, or pelvis.

 

 

Enlarging bladder mass with innumerable lesions in the liver and pulmonary nodules concerning for met
astatic disease. The liver lesions would be most amenable to percutaneous biopsy to confirm metastati
c disease.

## 2021-10-03 NOTE — EDPHYS
Physician Documentation                                                                           

 [facility]                                                                 

Name: Beverly Santacruz                                                                                 

Age: 81 yrs                                                                                       

Sex: Male                                                                                         

: 1940                                                                                   

MRN: [id number]                                                                                   

Arrival Date: 10/03/2021                                                                          

Time: 12:01                                                                                       

Account#: [id number]                                                                            

Bed 7                                                                                             

Private MD:                                                                                       

ED Physician Antony Gatica                                                                      

HPI:                                                                                              

10/03                                                                                             

12:47 This 81 yrs old  Male presents to ER via EMS with complaints of Fall Injury.   layla 

12:47 Details of fall: The patient fell from an upright position, while walking. Onset: The   layla 

      symptoms/episode began/occurred last night. Associated injuries: The patient sustained      

      injury to the chest, tenderness. Severity of symptoms: At their worst the symptoms were     

      mild, in the emergency department the symptoms are unchanged. The patient has not           

      experienced similar symptoms in the past.                                                   

                                                                                                  

Historical:                                                                                       

- Allergies:                                                                                      

12:07 No Known Allergies;                                                                     jd3 

- Home Meds:                                                                                      

12:07 aspirin 81 mg Oral chew 1 tab once daily [Active]; clopidogrel 75 mg Oral tab 1 tab     jd3 

      once daily [Active]; lactulose 20 gram/30 mL Oral soln 30 mL once daily [Active];           

      atorvastatin 40 mg Oral tab 1 tab once daily [Active]; Lactulose Oral [Active];             

      metoprolol tartrate 100 mg Oral tab [Active]; pantoprazole 40 mg Oral TbEC 1 tab once       

      daily [Active]; tamsulosin 0.4 mg Oral cp24 1 cap once daily [Active]; Travatan Z 0.004     

      % ophthalmic drop 1 drop once daily [Active];                                               

- PMHx:                                                                                           

12:07 Hypertension; Myocardial infarction; Bladder cancer; Gastric Reflux;                    jd3 

- PSHx:                                                                                           

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 

                                                                                                  

- Immunization history:: Adult Immunizations up to date.                                          

- Social history:: Smoking status: unknown.                                                       

- Immunization history: Last tetanus immunization: unknown.                                       

                                                                                                  

                                                                                                  

ROS:                                                                                              

12:48 Constitutional: Negative for fever, chills, and weight loss, Eyes: Negative for injury, layla 

      pain, redness, and discharge, ENT: Negative for injury, pain, and discharge, Neck:          

      Negative for injury, pain, and swelling, Cardiovascular: Negative for chest pain,           

      palpitations, and edema, Respiratory: Negative for shortness of breath, cough,              

      wheezing, and pleuritic chest pain, Back: Negative for injury and pain, : Negative        

      for injury, bleeding, discharge, and swelling, MS/Extremity: Negative for injury and        

      deformity, Psych: Negative for depression, anxiety, suicide ideation, homicidal             

      ideation, and hallucinations, Allergy/Immunology: Negative for hives, rash, and             

      allergies, Endocrine: Negative for neck swelling, polydipsia, polyuria, polyphagia, and     

      marked weight changes, Hematologic/Lymphatic: Negative for swollen nodes, abnormal          

      bleeding, and unusual bruising.                                                             

12:48 Abdomen/GI: Positive for abdominal pain, of the right upper quadrant and left upper         

      quadrant.                                                                                   

12:48 Skin: Positive for pallor.                                                                  

12:48 Neuro: Positive for weakness.                                                               

                                                                                                  

Exam:                                                                                             

12:48 Constitutional:  This is a well developed, well nourished patient who is awake, alert,  layla 

      and in no acute distress. Head/Face:  Normocephalic, atraumatic. Eyes:  Pupils equal        

      round and reactive to light, extra-ocular motions intact.  Lids and lashes normal.          

      Conjunctiva and sclera are non-icteric and not injected.  Cornea within normal limits.      

      Periorbital areas with no swelling, redness, or edema. ENT:  Nares patent. No nasal         

      discharge, no septal abnormalities noted.  Tympanic membranes are normal and external       

      auditory canals are clear.  Oropharynx with no redness, swelling, or masses, exudates,      

      or evidence of obstruction, uvula midline.  Mucous membranes moist. Neck:  Trachea          

      midline, no thyromegaly or masses palpated, and no cervical lymphadenopathy.  Supple,       

      full range of motion without nuchal rigidity, or vertebral point tenderness.  No            

      Meningismus. Cardiovascular:  Regular rate and rhythm with a normal S1 and S2.  No          

      gallops, murmurs, or rubs.  Normal PMI, no JVD.  No pulse deficits. Respiratory:  Lungs     

      have equal breath sounds bilaterally, clear to auscultation and percussion.  No rales,      

      rhonchi or wheezes noted.  No increased work of breathing, no retractions or nasal          

      flaring. Back:  No spinal tenderness.  No costovertebral tenderness.  Full range of         

      motion. Male :  Normal genitalia with no discharge or lesions. Skin:  Warm, dry with      

      normal turgor.  Normal color with no rashes, no lesions, and no evidence of cellulitis.     

12:48 Chest/axilla: Inspection: normal, Palpation: is normal, tenderness, that is mild, that      

      is moderate, of the  anterior aspect of right upper chest, anterior aspect of left          

      upper chest, right breast and left breast.                                                  

12:48 Abdomen/GI: Inspection: abdomen appears normal, Bowel sounds: normal, Palpation:            

      moderate abdominal tenderness, in the epigastric area, right upper quadrant and left        

      upper quadrant.                                                                             

12:48 Skin: Appearance: Color: pale, Temperature: normal temperature, Moisture: normal            

      moisture, abscess, not appreciated, cellulitis, is not appreciated, induration, is not      

      appreciated, injury, contusion(s), that are deep, of the chest and abdomen.                 

12:55 ECG was reviewed by the Attending Physician.                                            layla 

14:42 Abdomen/GI: Rectal exam: is unremarkable, Prostate: boggy, enlarged, rectal tone        layla 

      normal, Stool: guaiac negative, hemorrhoid(s), are not appreciated, mass, is not            

      appreciated, swelling, is not appreciated, tenderness, is not appreciated, Liver: is        

      enlarged, tenderness, that is moderate, Hernia: not appreciated.                            

                                                                                                  

Vital Signs:                                                                                      

12:07  / 48; Pulse 90; Resp 18 S; Temp 97.8(O); Pulse Ox 95% on R/A; Weight 57.61 kg    jd3 

      (R); Height 5 ft. 7 in. (170.18 cm) (R); Pain 7/10;                                         

13:34 Pulse 93; Resp 19 S; Pulse Ox 95% on R/A;                                               jd3 

14:41  / 58; Pulse 95; Resp 26; Pulse Ox 95% on 2 lpm NC;                               tw5 

15:20  / 63; Pulse 89; Resp 24 S; Pulse Ox 96% on 2 lpm NC;                             jd3 

12:07 Body Mass Index 19.89 (57.61 kg, 170.18 cm)                                             jd3 

                                                                                                  

Franklin Coma Score:                                                                               

12:09 Eye Response: spontaneous(4). Verbal Response: oriented(5). Motor Response: obeys       jd3 

      commands(6). Total: 15.                                                                     

                                                                                                  

Trauma Score (Adult):                                                                             

12:09 Eye Response: spontaneous(1); Verbal Response: oriented(1); Motor Response: obeys       jd3 

      commands(2); Systolic BP: > 89 mm Hg(4); Respiratory Rate: 10 to 29 per min(4); Franklin     

      Score: 15; Trauma Score: 12                                                                 

                                                                                                  

MDM:                                                                                              

12:05 Patient medically screened.                                                             layla 

12:51 Differential Diagnosis. Differential diagnosis: closed head injury, contusion,          layla 

      fracture, multiple trauma, sprain, strain. Differential diagnosis: gastritis,               

      non-specific abd pain, pancreatitis, Peptic Ulcer Disease, urinary tract infection,         

      Chest Wall Contusion Chest Wall Injury Pneumothorax Pulmonary Contusion Rib Fracture.       

      Data reviewed: vital signs, nurses notes, lab test result(s), EKG, radiologic studies,      

      CT scan, plain films. Data interpreted: Cardiac monitor: rate is 90 beats/min, rhythm       

      is regular, Pulse oximetry: on room air is 95 %. Test interpretation: by ED physician       

      or midlevel provider: ECG, plain radiologic studies. Counseling: I had a detailed           

      discussion with the patient and/or guardian regarding: the historical points, exam          

      findings, and any diagnostic results supporting the discharge/admit diagnosis, lab          

      results, radiology results, the need for further work-up and treatment in the hospital.     

                                                                                                  

10/03                                                                                             

12:40 Order name: Basic Metabolic Panel                                                       [facility] 

10/03                                                                                             

12:40 Order name: CBC with Diff                                                               [facility] 

10/03                                                                                             

12:40 Order name: LFT's                                                                       [facility] 

10/03                                                                                             

12:40 Order name: Magnesium                                                                   [facility] 

10/03                                                                                             

12:40 Order name: NT PRO-BNP                                                                  [facility] 

10/03                                                                                             

12:40 Order name: PT-INR                                                                      [facility] 

10/03                                                                                             

12:40 Order name: Troponin (emerg Dept Use Only)                                              [facility] 

10/03                                                                                             

12:40 Order name: CK                                                                          [facility] 

10/03                                                                                             

12:40 Order name: Ckmb; Complete Time: 14:11                                                  [facility] 

10/03                                                                                             

12:40 Order name: Type And Screen                                                             [facility] 

10/03                                                                                             

12:40 Order name: Lipase; Complete Time: 14:11                                                [facility] 

10/03                                                                                             

12:40 Order name: Urine Culture                                                               [facility] 

10/03                                                                                             

12:40 Order name: Basic Metabolic Panel; Complete Time: 14:11                                 [facility]

10/03                                                                                             

12:40 Order name: CBC with Automated Diff; Complete Time: 14:39                               [facility]

10/03                                                                                             

12:40 Order name: Liver (Hepatic) Function; Complete Time: 14:11                              [facility]

10/03                                                                                             

12:40 Order name: Magnesium; Complete Time: 14:11                                             [facility]

10/03                                                                                             

12:41 Order name: NT PRO-BNP; Complete Time: 14:11                                            [facility]

10/03                                                                                             

12:41 Order name: Protime (+INR); Complete Time: 13:43                                        [facility]

10/03                                                                                             

12:41 Order name: Troponin (Emerg Dept Use Only); Complete Time: 14:11                        [facility]

10/03                                                                                             

12:41 Order name: Creatine Phosphokinase; Complete Time: 14:11                                [facility]

10/03                                                                                             

13:44 Order name: Lactate                                                                     [facility] 

10/03                                                                                             

13:44 Order name: Blood Culture Adult (2)                                                     [facility] 

10/03                                                                                             

13:44 Order name: Phosphorus; Complete Time: 14:39                                            [facility] 

10/03                                                                                             

14:11 Order name: ABG                                                                         [facility] 

10/03                                                                                             

14:32 Order name: CBC Smear Scan; Complete Time: 14:39                                        [facility]

10/03                                                                                             

15:03 Order name: Packed RBC Leukored                                                         [facility]

10/03                                                                                             

15:17 Order name: Urine Dipstick-Ancillary                                                    [facility]

10/03                                                                                             

15:50 Order name: SARS-COV-2 RT PCR                                                           [facility]

10/03                                                                                             

12:40 Order name: XRAY Chest (1 view); Complete Time: 13:43                                   [facility] 

10/03                                                                                             

12:40 Order name: EKG; Complete Time: 12:41                                                   [facility] 

10/03                                                                                             

12:40 Order name: Cardiac monitoring; Complete Time: 12:41                                    [facility] 

10/03                                                                                             

12:40 Order name: EKG - Nurse/Tech; Complete Time: 12:55                                      [facility] 

10/03                                                                                             

12:40 Order name: IV Saline Lock; Complete Time: 12:40                                        [facility] 

10/03                                                                                             

12:40 Order name: Labs collected and sent; Complete Time: 12:55                               [facility] 

10/03                                                                                             

12:40 Order name: O2 Per Protocol; Complete Time: 12:40                                       [facility] 

10/03                                                                                             

12:40 Order name: O2 Sat Monitoring; Complete Time: 12:40                                     [facility] 

10/03                                                                                             

12:40 Order name: Pelvis XRAY                                                                 [facility] 

10/03                                                                                             

12:40 Order name: CT Traumagram (Head C Spine CAP W Con); Complete Time: 14:11                [facility] 

10/03                                                                                             

12:40 Order name: Urine Dipstick-Ancillary (obtain specimen); Complete Time: 15:17            [facility] 

10/03                                                                                             

13:44 Order name: IV Saline Lock - Large Bore; Complete Time: 14:08                           [facility] 

10/03                                                                                             

14:40 Order name: EKG - Nurse/Tech; Complete Time: 15:08                                      [facility] 

10/03                                                                                             

14:56 Order name: CONS Physician Consult                                                      [facility]

10/03                                                                                             

14:56 Order name: CONS Physician Consult                                                      [facility]

                                                                                                  

EC:55 Rate is 89 beats/min. Rhythm is regular. QRS Axis is Normal. OK interval is normal. QRS layla 

      interval is normal. QT interval is normal. No Q waves. T waves are Normal. No ST            

      changes noted. Clinical impression: Abnormal EKG without significant change and No          

      evidence of ischemia. Interpreted by me. Reviewed by me.                                    

                                                                                                  

Administered Medications:                                                                         

13:45 Discontinued: NS 0.9% 1000 ml IV at 125 ml/hr continuous                                layla 

13:30 Not Given (Patient Refused): fentaNYL (PF) 25 mcg IVP once; RASS on ADMIN: Combtv4,     jd3 

      Very Agttd3, Agttd2, Rstlss1, AlertClm0, Drwsy-1, Lt Sdtn-2, Mod Sdtn-3, Dp Sdtn-4,         

      UnArsble-5                                                                                  

13:31 Drug: NS 0.9% 500 ml Route: IV; Rate: bolus; Site: right hand;                          jd3 

13:31 Drug: NS 0.9% 1000 ml Route: IV; Rate: 125 ml/hr; Site: right hand;                     jd3 

13:31 Drug: Pepcid (famotidine) 20 mg Route: IVP; Site: right hand;                           jd3 

13:31 Drug: Zofran (Ondansetron) 4 mg Route: IVP; Site: right hand;                           jd3 

14:16 Drug: ProTONIX (pantoprazole) 40 mg Route: IVP; Site: right hand;                       tw5 

14:25 Drug: Zosyn (piperacillin-tazobactam) 3.375 grams Route: IVPB; Infused Over: 60 mins;   tw5 

      Site: right hand;                                                                           

14:34 Drug: Potassium Chloride 20 mEq Route: IV; Rate: per protocol; Site: left antecubital;  tw5 

14:34 Drug: NS 0.9% with KCl 20 mEq/L 1000 ml Route: IV; Rate: 125 ml/hr; Site: left          tw5 

      antecubital;                                                                                

15:03 Drug: Xopenex (levalbuterol) 2.5 mg Route: Inhalation;                                  tw5 

15:03 Drug: AtroVENT (ipratropium) Aerosol 0.5 mg Route: Inhalation;                          tw5 

15:06 Drug: SOLU-Medrol (methylPrednisoLONE) 100 mg Route: IVP; Site: right hand;             tw5 

17:30 Drug: Potassium Chloride 20 mEq Route: IV; Rate: per protocol; Site: left antecubital;  jd3 

                                                                                                  

                                                                                                  

Disposition Summary:                                                                              

10/03/21 14:47                                                                                    

Hospitalization Ordered                                                                           

      Hospitalization Status: Inpatient Admission                                             layla 

      Provider: Mary Torres cha 

      Location: [facility] (Inpatient)                                                 [place] 

      Condition: Serious                                                                      layla 

      Problem: new                                                                            layla 

      Symptoms: have improved                                                                 layla 

      Bed/Room Type: Standard                                                                 layla 

      Room Assignment: 424(10/03/21 16:37)                                                    em1 

      Diagnosis                                                                                   

        - Dehydration                                                                         layla 

        - Hypokalemia                                                                         layla 

        - Anemia, unspecified                                                                 layla 

        - COPD/ Chronic obstructive pulmonary disease, unspecified                            layla 

        - Chronic respiratory failure with hypercapnia                                        layla 

        - Abdominal pain, Generalized - enlarging bladder mass, liver and pulmonary metastisischa 

        - Elevated white blood cell count                                                     layla 

        - Bladder disorder, unspecified - bladder cancer, enlarging mass                      layla 

      Forms:                                                                                      

        - Medication Reconciliation Form                                                      layla 

        - SBAR form                                                                           layla 

Signatures:                                                                                       

Dispatcher [employer]                           EDMS                                                 

Antony Gatica MD MD cha Martinez, Eric                               em1                                                  

Irving Mercedes RN                    RN   Светлана Deal                                tw5                                                  

                                                                                                  

Corrections: (The following items were deleted from the chart)                                    

14:32 13:33 Manual Differential ordered. EDMS                                                 EDMS

14:56 12:41 CORONAVIRUS+MR.LAB.BRZ ordered. EDMS                                              EDMS

16:37 14:47 layla                                                                               em1 

                                                                                                  

**************************************************************************************************

## 2021-10-03 NOTE — RAD REPORT
EXAM DESCRIPTION:  RAD - Pelvis - 10/3/2021 1:23 pm

 

CLINICAL HISTORY:  PAIN

 

COMPARISON:  CT-RAD THERAPY FIELD PELVIS dated 7/15/2021

 

FINDINGS:  No acute fracture. No dislocation. Mild bilateral acetabular joint degenerate changes. Ost
eopenia.

 

IMPRESSION:  No pelvic fracture identified

## 2021-10-03 NOTE — ER
Nurse's Notes                                                                                     

 [facility]                                                                 

Name: Beverly Santacruz                                                                                 

Age: 81 yrs                                                                                       

Sex: Male                                                                                         

: 1940                                                                                   

MRN: [id number]                                                                                   

Arrival Date: 10/03/2021                                                                          

Time: 12:01                                                                                       

Account#: [id number]                                                                            

Bed 7                                                                                             

Private MD:                                                                                       

Diagnosis: Dehydration;Hypokalemia;Anemia, unspecified;COPD/ Chronic obstructive pulmonary        

  disease, unspecified;Chronic respiratory failure with hypercapnia;Abdominal pain,               

  Generalized-enlarging bladder mass, liver and pulmonary metastisis;Elevated white               

  blood cell count;Bladder disorder, unspecified-bladder cancer, enlarging mass                   

                                                                                                  

Presentation:                                                                                     

10/03                                                                                             

12:03 Chief complaint: EMS states: "pt reported a fall that happened last night and was on    jd3 

      the ground for about 2-3 hours before getting up. denies LOC. reports pain to his left      

      side ribs as well as left hip/groin. he was initially hypotensive so we started a 20 G      

      to the right hand and gave NS, blood pressure is doing better now. pt did report that       

      he was feeling dizzy this morning while sitting still in his chair. ". Coronavirus          

      screen: At this time, the client does not indicate any symptoms associated with             

      coronavirus-19. Ebola Screen: Patient negative for fever greater than or equal to 101.5     

      degrees Fahrenheit, and additional compatible Ebola Virus Disease symptoms. Initial         

      Sepsis Screen: Does the patient meet any 2 criteria? No. Patient's initial sepsis           

      screen is negative. Does the patient have a suspected source of infection? No.              

      Patient's initial sepsis screen is negative. Risk Assessment: Do you want to hurt           

      yourself or someone else? Patient reports no desire to harm self or others. Onset of        

      symptoms was 2021.                                                              

12:03 Method Of Arrival: EMS: [facility]                                                jd3 

12:03 Acuity: NAOMI 2                                                                           jd3 

12:15 Care prior to arrival: Medication(s) given: Normal saline infusion, 1000 mL, IV         jd3 

      initiated. 20 GA, in the right hand. Mechanism of Injury: Fall from standing position.      

      Trauma event details: Injury occurred in the [facility], Injury occurred: at        

      home. Injury occurred: 2021.                                                    

                                                                                                  

Trauma Activation: Alert                                                                          

 Physician: ED Physician; Name: En; Notified At: 2021/10/03 12:10; Arrived At:              

  2021/10/03 12:15                                                                                

 Physician: General Surgeon; Name: ; Notified At: 2021/10/03 12:15; Arrived At:                   

 Physician: Radiology; Name: ct tech and xray tech; Notified At: 2021/10/03 12:10;                

  Arrived At: 2021/10/03 12:10                                                                    

 Physician: Respiratory; Name: ; Notified At: 2021/10/03 12:15; Arrived At:                       

 Physician: Lab; Name: ; Notified At: 2021/10/03 12:15; Arrived At:                               

                                                                                                  

Historical:                                                                                       

- Allergies:                                                                                      

12:07 No Known Allergies;                                                                     jd3 

- Home Meds:                                                                                      

12:07 aspirin 81 mg Oral chew 1 tab once daily [Active]; clopidogrel 75 mg Oral tab 1 tab     jd3 

      once daily [Active]; lactulose 20 gram/30 mL Oral soln 30 mL once daily [Active];           

      atorvastatin 40 mg Oral tab 1 tab once daily [Active]; Lactulose Oral [Active];             

      metoprolol tartrate 100 mg Oral tab [Active]; pantoprazole 40 mg Oral TbEC 1 tab once       

      daily [Active]; tamsulosin 0.4 mg Oral cp24 1 cap once daily [Active]; Travatan Z 0.004     

      % ophthalmic drop 1 drop once daily [Active];                                               

- PMHx:                                                                                           

12:07 Hypertension; Myocardial infarction; Bladder cancer; Gastric Reflux;                    jd3 

- PSHx:                                                                                           

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 

                                                                                                  

- Immunization history:: Adult Immunizations up to date.                                          

- Social history:: Smoking status: unknown.                                                       

- Immunization history: Last tetanus immunization: unknown.                                       

                                                                                                  

                                                                                                  

Screenin:14 Abuse screen: Denies threats or abuse. Nutritional screening: No deficits noted.        jd3 

      Tuberculosis screening: No symptoms or risk factors identified.                             

12:16 Fall Risk Fall in past 12 months (25 points). IV access (20 points). Ambulatory Aid-    jd3 

      None/Bed Rest/Nurse Assist (0 pts). Gait- Weak (10 pts.). Total Franks Fall Scale            

      indicates High Risk Score (45 or more points). Fall prevention measures have been           

      instituted. Side Rails Up X 2 Placed Close to Nursing Station Frequent Obs/Assessments      

      Occuring.                                                                                   

                                                                                                  

Primary Survey:                                                                                   

12:09 NO uncontrolled hemorrhage observed. A: The patient is alert. Airway: patent, No        jd3 

      supplemental oxygen in use on arrival. Breathing/Chest: Respiratory pattern: regular,       

      Respiratory effort: spontaneous, unlabored, Breath sounds: diminished, Chest                

      inspection: symmetrical rise and fall of the chest. Circulation: Cardiac rhythm:            

      irregular rhythm Heart tones present. Skin color: pale, Skin temperature: warm.             

      Disability Alert. Exposure/Environment: All clothing and personal items were removed.       

      Forensic evidence collection is not deemed to be indicated at this time. Items placed       

      in patient belonging bag. There is no evidence of uncontrolled external bleeding. No        

      obvious injuries are noted at this time. A warming method has been applied: A warm          

      blanket has been provided to the patient.                                                   

13:33 Reassessment Airway Airway Patent Oxygen No O2 Oral cavity Clear Trachea Midline        jd3 

      Breathing/Chest Respiratory pattern Regular Respiratory effort Spontaneous Unlabored        

      Breath sounds Diminished Chest inspection Symmetrical Circulation Heart tones Present       

      Color Pale Temperature Warm Disability Alert.                                               

                                                                                                  

Secondary Survey:                                                                                 

12:09 HEENT: No deficits noted. Gastrointestinal: No deficits noted. : No signs and/or      jd3 

      symptoms were reported regarding the genitourinary system. Musculoskeletal:                 

      Circulation, motion, and sensation intact. Range of motion: limited in left hip.            

                                                                                                  

Assessment:                                                                                       

12:09 General: Appears in no apparent distress. uncomfortable, Behavior is calm, cooperative, jd3 

      appropriate for age, Reports fatigue for 2-3 days. Pain: Complains of pain in left          

      lateral anterior chest, left femoral area and left hip Quality of pain is described as      

      aching, tender. Neuro: Level of Consciousness is awake, alert, obeys commands, Oriented     

      to person, place, time, situation. EENT: No signs and/or symptoms were reported             

      regarding the EENT system. Cardiovascular: Heart tones present Capillary refill < 3         

      seconds Patient's skin is warm and dry. Rhythm is irregular. Respiratory: Airway is         

      patent Respiratory effort is even, unlabored, Respiratory pattern is regular,               

      symmetrical, Denies cough, shortness of breath. GI: No signs and/or symptoms were           

      reported involving the gastrointestinal system. Abdomen is round non-distended, Abd is      

      soft and non tender X 4 quads. : No signs and/or symptoms were reported regarding the     

      genitourinary system. Derm: Skin is intact, Skin is dry, Skin is pale, Skin temperature     

      is warm. Musculoskeletal: Circulation, motion, and sensation intact. Range of motion:       

      limited in left hip.                                                                        

13:34 Reassessment: No changes from previously documented assessment. Patient and/or family   jd3 

      updated on plan of care and expected duration. Pain level reassessed.                       

15:20 Reassessment: Patient appears in no apparent distress at this time. Patient and/or      jd3 

      family updated on plan of care and expected duration. Pain level reassessed. Patient is     

      alert, oriented x 3, equal unlabored respirations, skin warm/dry/pink. pt reported          

      feeling better when oxygen was applied.                                                     

                                                                                                  

Vital Signs:                                                                                      

12:07  / 48; Pulse 90; Resp 18 S; Temp 97.8(O); Pulse Ox 95% on R/A; Weight 57.61 kg    jd3 

      (R); Height 5 ft. 7 in. (170.18 cm) (R); Pain 7/10;                                         

13:34 Pulse 93; Resp 19 S; Pulse Ox 95% on R/A;                                               jd3 

14:41  / 58; Pulse 95; Resp 26; Pulse Ox 95% on 2 lpm NC;                               tw5 

15:20  / 63; Pulse 89; Resp 24 S; Pulse Ox 96% on 2 lpm NC;                             jd3 

12:07 Body Mass Index 19.89 (57.61 kg, 170.18 cm)                                             jd3 

                                                                                                  

[place] Coma Score:                                                                               

12:09 Eye Response: spontaneous(4). Verbal Response: oriented(5). Motor Response: obeys       jd3 

      commands(6). Total: 15.                                                                     

                                                                                                  

Trauma Score (Adult):                                                                             

12:09 Eye Response: spontaneous(1); Verbal Response: oriented(1); Motor Response: obeys       jd3 

      commands(2); Systolic BP: > 89 mm Hg(4); Respiratory Rate: 10 to 29 per min(4); [place]     

      Score: 15; Trauma Score: 12                                                                 

                                                                                                  

ED Course:                                                                                        

12:01 Patient arrived in ED.                                                                  am2 

12:04 Antony Gatica MD is Attending Physician.                                             layla 

12:07 Triage completed.                                                                       jd3 

12:09 Arm band placed on.                                                                     jd3 

12:14 Patient has correct armband on for positive identification. Placed in gown. Bed in low  jd3 

      position. Call light in reach. Side rails up X2. Cardiac monitor on. Pulse ox on. NIBP      

      on.                                                                                         

12:14 Patient maintains SpO2 saturation greater than 95% on room air.                         jd3 

12:16 Thermoregulation: warm blanket given to patient.                                        jd3 

12:40 Irving Mercedes RN is Primary Nurse.                                                  jd3 

13:23 XRAY Chest (1 view) In Process Unspecified.                                             EDMS

13:24 Pelvis XRAY In Process Unspecified.                                                     EDMS

13:31 Inserted saline lock: 22 gauge in left antecubital area, using aseptic technique.       jd3 

13:37 CBC with Diff Sent.                                                                     tw5 

13:37 Basic Metabolic Panel Sent.                                                             tw5 

13:37 Troponin (emerg Dept Use Only) Sent.                                                    tw5 

13:37 Magnesium Sent.                                                                         tw5 

13:43 Notified ED physician of a critical lab result(s). Potassium of 2.3.                    tw5 

13:47 CT Traumagram (Head C Spine CAP W Con) In Process Unspecified.                          EDMS

14:05 First set of blood cultures drawn by ED staff.                                          tw5 

14:20 Oxygen administration via nasal cannula \T\ 2L/min.                                       tw5 

14:20 Consent for blood and/or blood product transfusion explained by staff, signed by        tw5 

      patient.                                                                                    

14:22 Second set of blood cultures drawn by ED staff.                                         tw5 

14:40 CK Sent.                                                                                tw5 

14:40 LFT's Sent.                                                                             tw5 

14:40 NT PRO-BNP Sent.                                                                        tw5 

14:40 PT-INR Sent.                                                                            tw5 

14:42 Mary Torres MD is Hospitalizing Provider.                                           layla 

                                                                                                  

Administered Medications:                                                                         

13:45 Discontinued: NS 0.9% 1000 ml IV at 125 ml/hr continuous                                layla 

13:30 Not Given (Patient Refused): fentaNYL (PF) 25 mcg IVP once; RASS on ADMIN: Combtv4,     jd3 

      Very Agttd3, Agttd2, Rstlss1, AlertClm0, Drwsy-1, Lt Sdtn-2, Mod Sdtn-3, Dp Sdtn-4,         

      UnArsble-5                                                                                  

13:31 Drug: NS 0.9% 500 ml Route: IV; Rate: bolus; Site: right hand;                          jd3 

13:31 Drug: NS 0.9% 1000 ml Route: IV; Rate: 125 ml/hr; Site: right hand;                     jd3 

13:31 Drug: Pepcid (famotidine) 20 mg Route: IVP; Site: right hand;                           jd3 

13:31 Drug: Zofran (Ondansetron) 4 mg Route: IVP; Site: right hand;                           jd3 

14:16 Drug: ProTONIX (pantoprazole) 40 mg Route: IVP; Site: right hand;                       tw5 

14:25 Drug: Zosyn (piperacillin-tazobactam) 3.375 grams Route: IVPB; Infused Over: 60 mins;   tw5 

      Site: right hand;                                                                           

14:34 Drug: Potassium Chloride 20 mEq Route: IV; Rate: per protocol; Site: left antecubital;  tw5 

14:34 Drug: NS 0.9% with KCl 20 mEq/L 1000 ml Route: IV; Rate: 125 ml/hr; Site: left          tw5 

      antecubital;                                                                                

15:03 Drug: Xopenex (levalbuterol) 2.5 mg Route: Inhalation;                                  tw5 

15:03 Drug: AtroVENT (ipratropium) Aerosol 0.5 mg Route: Inhalation;                          tw5 

15:06 Drug: SOLU-Medrol (methylPrednisoLONE) 100 mg Route: IVP; Site: right hand;             tw5 

17:30 Drug: Potassium Chloride 20 mEq Route: IV; Rate: per protocol; Site: left antecubital;  jd3 

                                                                                                  

                                                                                                  

Output:                                                                                           

14:41 Urine: 5ml (Voided); Total: 5ml.                                                        tw5 

                                                                                                  

Outcome:                                                                                          

14:47 Decision to Hospitalize by Provider.                                                    layla 

17:31 Patient left the ED.                                                                    jd3 

                                                                                                  

Signatures:                                                                                       

Dispatcher [employer]                           EDMS                                                 

Antony Gatica MD MD cha Moreno, Amanda am2 Davies, Jonathon, RN                    RN   Светлана Deal                                tw5                                                  

                                                                                                  

Corrections: (The following items were deleted from the chart)                                    

13:32 12:09 General: Appears in no apparent distress. uncomfortable, Behavior is calm,        jd3 

      cooperative, appropriate for age, jd3                                                       

13:32 12:09 Neuro: Level of Consciousness is awake, alert, obeys commands, Oriented to        jd3 

      person, place, time, situation, Denies blurred vision numbness LOC. jd3                     

13:34 12:09 Breathing/Chest: Respiratory pattern: regular, Respiratory effort: spontaneous,   jd3 

      unlabored, Breath sounds: clear, bilaterally. Chest inspection: symmetrical rise and        

      fall of the chest, jd3                                                                      

13:34 12:09 Circulation: Cardiac rhythm: irregular rhythm Heart tones present. Pulses:        jd3 

      palpable right radial artery, right dorsalis pedis artery, left radial artery and left      

      dorsalis pedis artery. Skin color: pink, Skin temperature: warm, jd3                        

13:35 12:09 Derm: Skin is intact, Skin is dry, Skin is normal, Skin temperature is warm adrian   jd3 

                                                                                                  

**************************************************************************************************

## 2021-10-03 NOTE — RAD REPORT
EXAM DESCRIPTION:  RAD - Chest Single View - 10/3/2021 1:23 pm

 

CLINICAL HISTORY:  COUGH

 

COMPARISON:  No comparisons

 

FINDINGS:  Lines: Left subclavian approach ICD. Right IJ approach Port-A-Cath with tip overlying the 
SVC.

Lungs: No evidence of edema or pneumonia.

Pleural: No significant pleural effusions or pneumothorax.

Cardiac: The heart size is within normal limits.

Bones: No acute fractures.

Other:

 

IMPRESSION:  No acute cardiopulmonary disease.

## 2021-10-04 LAB
BUN BLD-MCNC: 28 MG/DL (ref 7–18)
GLUCOSE SERPLBLD-MCNC: 118 MG/DL (ref 74–106)
HCT VFR BLD CALC: 25.8 % (ref 39.6–49)
LYMPHOCYTES # SPEC AUTO: 0.2 K/UL (ref 0.7–4.9)
MORPHOLOGY BLD-IMP: (no result)
NT-PROBNP SERPL-MCNC: (no result) PG/ML (ref ?–450)
PMV BLD: 10.2 FL (ref 7.6–11.3)
POTASSIUM SERPL-SCNC: 2.6 MMOL/L (ref 3.5–5.1)
RBC # BLD: 2.84 M/UL (ref 4.33–5.43)

## 2021-10-04 RX ADMIN — SODIUM CHLORIDE SCH MLS: 9 INJECTION, SOLUTION INTRAVENOUS at 11:30

## 2021-10-04 RX ADMIN — Medication SCH ML: at 20:58

## 2021-10-04 RX ADMIN — SODIUM CHLORIDE SCH MLS: 9 INJECTION, SOLUTION INTRAVENOUS at 16:41

## 2021-10-04 RX ADMIN — Medication SCH ML: at 09:00

## 2021-10-04 RX ADMIN — SODIUM CHLORIDE AND POTASSIUM CHLORIDE SCH MLS: .9; .15 SOLUTION INTRAVENOUS at 23:42

## 2021-10-04 RX ADMIN — SODIUM CHLORIDE SCH MLS: 9 INJECTION, SOLUTION INTRAVENOUS at 01:38

## 2021-10-04 RX ADMIN — POTASSIUM CHLORIDE SCH MLS: 200 INJECTION, SOLUTION INTRAVENOUS at 12:23

## 2021-10-04 RX ADMIN — METHYLPREDNISOLONE SODIUM SUCCINATE SCH MG: 40 INJECTION, POWDER, FOR SOLUTION INTRAMUSCULAR; INTRAVENOUS at 01:40

## 2021-10-04 RX ADMIN — METHYLPREDNISOLONE SODIUM SUCCINATE SCH MG: 40 INJECTION, POWDER, FOR SOLUTION INTRAMUSCULAR; INTRAVENOUS at 08:50

## 2021-10-04 RX ADMIN — POTASSIUM CHLORIDE SCH MLS: 200 INJECTION, SOLUTION INTRAVENOUS at 10:00

## 2021-10-04 RX ADMIN — SODIUM CHLORIDE AND POTASSIUM CHLORIDE SCH: .9; .15 SOLUTION INTRAVENOUS at 03:06

## 2021-10-04 RX ADMIN — ASPIRIN SCH MG: 81 TABLET, COATED ORAL at 08:50

## 2021-10-04 RX ADMIN — POTASSIUM CHLORIDE SCH MLS: 200 INJECTION, SOLUTION INTRAVENOUS at 14:00

## 2021-10-04 RX ADMIN — FENTANYL SCH MCG: 75 PATCH, EXTENDED RELEASE TRANSDERMAL at 08:50

## 2021-10-04 RX ADMIN — METHYLPREDNISOLONE SODIUM SUCCINATE SCH MG: 40 INJECTION, POWDER, FOR SOLUTION INTRAMUSCULAR; INTRAVENOUS at 16:42

## 2021-10-04 RX ADMIN — HYDROCODONE BITARTRATE AND ACETAMINOPHEN PRN TAB: 10; 325 TABLET ORAL at 20:53

## 2021-10-04 RX ADMIN — SODIUM CHLORIDE AND POTASSIUM CHLORIDE SCH MLS: .9; .15 SOLUTION INTRAVENOUS at 12:24

## 2021-10-04 NOTE — RAD REPORT
EXAM DESCRIPTION:  Barb Single View10/4/2021 5:25 am

 

CLINICAL HISTORY:  Chest pain

 

COMPARISON:  October 3, 2021

 

FINDINGS:  Mild to moderate right and mild left pulmonary opacities.

 

Heart is normal size. Pacemaker leads in place. Central venous line with its tip in superior vena cav
a

 

IMPRESSION:  Mild to moderate right and mild left pulmonary opacities probably pneumonia

## 2021-10-04 NOTE — HP
Date of Admission:  10/03/2021



History Of Present Illness:  A 81-year-old male, whom I have seen for some time, was diagnosed about 
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  He was started on chemot
herapy and then radiation therapy and is following on that here at the cancer center.  He tripped yes
terday at home because he was feeling weak.  He tripped and fell.  He caught himself, but hit himself
 on anterior chest wall with the house wall, but no head trauma, no trauma elsewhere and no loss of c
onsciousness.  Because of the fall and feeling weakness in general, he was brought to the emergency r
oom.  The patient was found to have dehydration and possible bilateral pneumonia and anemia of cancer
 and he was admitted for that.  The patient has been short of breath for some time, no change in that
.  No cough.  No fever.  No chills.  No other complaints.



Review of Systems:

Cardiovascular:  No complaints. 

Respiratory:  Chronic shortness of breath (cancer diagnosis and treatment started). 

Gastrointestinal:  No nausea.  No vomiting.  No diarrhea.  No symptoms. 

Genitourinary:  Has complaint of pain in the urine bladder area.  No other complaints. 

Neurological:  No complaint. 

Skeletomuscular:  Bone aches off and on both sides. 

Neurological:  No complaints.



Past Medical History:  

1.As above.

2.Hypertension.

3.Hyperlipidemia.

4.Benign prostatic hypertrophy.

5.History of myocardial infarction in the past.

6.Gastroesophageal reflux disease.

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



Social History:  No smoking, alcohol, or drug abuse history.



Family History:  Noncontributing.



Medications:  Include atorvastatin 40 mg p.o. daily, Plavix 75 mg p.o. daily, lactulose 20 mg p.o. da
louie, metoprolol 100 mg p.o. daily, fentanyl patch 75 mcg every 3rd day transdermal patch, gabapentin 
300 mg p.o. at bedtime, and Lortab 10/325 one q.4 hours p.r.n.



Allergies:  NO KNOWN DRUG ALLERGIES.



Physical Examination:

Vital Signs:  Blood pressure 123/57, pulse 93, temperature 97. 

Heart:  Regular rate and rhythm. 

Chest:  Mild bilateral crackles. 

Abdomen:  Soft, nontender.  No hepatosplenomegaly.  Bowel sounds are active. 

Extremities:  No edema.  No cyanosis.  Peripheral pulses are felt. 

Neurological:  Alert, oriented x4.  Grossly intact.



Laboratory Data:  Chest x-ray; bilateral pulmonary densities, possible pneumonia.  CAT scan of the he
ad and cervical spine showed no intracranial hemorrhage or fluid collection.  No edema.  No shift.  T
he patient has cerebral atrophy, mucosal thickening of the maxillary sinus.  CT C-spine, no fracture 
or subluxation.  Chest and abdomen CAT scan, no acute abnormality and CAT scan of the pelvis showed e
nlarged bladder mass, there are multiple lesions and pulmonary nodules concerning for metastatic dise
ase.  CBC; white cell count 28.5, went down to 25.6; hemoglobin 5.8, went up to 8.8 after 2 units of 
blood transfusion; hematocrit 17.9 up to 25.8; platelet count 58, now with 41.  ABGs on admission; pH
 7.55, pCO2 49, pO2 83.3, bicarb 22.8, saturation 92.  Chemistry; sodium 134, potassium 2.3, chloride
 86, CO2 40, BUN 31, creatinine 1.04, GFR 69.  After supplement of potassium in the emergency room, t
he potassium went up to 2.6 status post hydration and GFR went up to more than 90.  BNP was 12,812.  
Culture is pending.



Assessment And Plan:  

1.Bladder cancer, metastatic.

2.Contusion of chest wall area after a fall.  No fracture, no acute pathology.

3.Anemia of cancer.

4.Thrombocytopenia with chemo and radiation.

5.Volume depletion.  The patient is on IV fluids.

6.Hypokalemia.  The patient is on electrolyte protocol, supplementation for that.

7.Bilateral pneumonia, likely.  We checked the patient and put the patient on Zosyn 3.375 q.8 hours 
IV.  I think the patient seemed a little bit better with hydration and starting antibiotics and trans
fusion.  I have asked Oncology to see the patient with us.  Discussed also with the patient CPR that 
could be done __________ and the patient being alert and oriented x4 and is pending the discussion.  
He refused any of those measures to be done and he did not want any CPR to be done for him, so he rodney
l be DNR.  We will monitor the patients's electrolytes, blood count, and continue him on gentle hydra
tion and IV antibiotics.  Look orders for details.





MFS/MODL

DD:  10/04/2021 11:38:07Voice ID:  [id number]

## 2021-10-05 LAB
BUN BLD-MCNC: 27 MG/DL (ref 7–18)
GLUCOSE SERPLBLD-MCNC: 120 MG/DL (ref 74–106)
HCT VFR BLD CALC: 29.1 % (ref 39.6–49)
LYMPHOCYTES # SPEC AUTO: 0.2 K/UL (ref 0.7–4.9)
MAGNESIUM SERPL-MCNC: 2.3 MG/DL (ref 1.8–2.4)
PMV BLD: 10.5 FL (ref 7.6–11.3)
POTASSIUM SERPL-SCNC: 3.3 MMOL/L (ref 3.5–5.1)
RBC # BLD: 3.15 M/UL (ref 4.33–5.43)

## 2021-10-05 RX ADMIN — SODIUM CHLORIDE SCH MLS: 9 INJECTION, SOLUTION INTRAVENOUS at 08:56

## 2021-10-05 RX ADMIN — Medication SCH ML: at 08:56

## 2021-10-05 RX ADMIN — METHYLPREDNISOLONE SODIUM SUCCINATE SCH MG: 40 INJECTION, POWDER, FOR SOLUTION INTRAMUSCULAR; INTRAVENOUS at 17:42

## 2021-10-05 RX ADMIN — METHYLPREDNISOLONE SODIUM SUCCINATE SCH MG: 40 INJECTION, POWDER, FOR SOLUTION INTRAMUSCULAR; INTRAVENOUS at 08:56

## 2021-10-05 RX ADMIN — ASPIRIN SCH MG: 81 TABLET, COATED ORAL at 08:56

## 2021-10-05 RX ADMIN — HYDROCODONE BITARTRATE AND ACETAMINOPHEN PRN TAB: 10; 325 TABLET ORAL at 03:30

## 2021-10-05 RX ADMIN — PANTOPRAZOLE SODIUM SCH MG: 40 TABLET, DELAYED RELEASE ORAL at 06:27

## 2021-10-05 RX ADMIN — SODIUM CHLORIDE AND POTASSIUM CHLORIDE SCH MLS: .9; .15 SOLUTION INTRAVENOUS at 15:38

## 2021-10-05 RX ADMIN — Medication SCH: at 20:38

## 2021-10-05 RX ADMIN — HYDROCODONE BITARTRATE AND ACETAMINOPHEN PRN TAB: 10; 325 TABLET ORAL at 20:36

## 2021-10-05 RX ADMIN — SODIUM CHLORIDE AND POTASSIUM CHLORIDE SCH: .9; .15 SOLUTION INTRAVENOUS at 19:06

## 2021-10-05 RX ADMIN — METHYLPREDNISOLONE SODIUM SUCCINATE SCH MG: 40 INJECTION, POWDER, FOR SOLUTION INTRAMUSCULAR; INTRAVENOUS at 00:48

## 2021-10-05 RX ADMIN — SODIUM CHLORIDE SCH MLS: 9 INJECTION, SOLUTION INTRAVENOUS at 00:48

## 2021-10-05 RX ADMIN — SODIUM CHLORIDE SCH MLS: 9 INJECTION, SOLUTION INTRAVENOUS at 17:43

## 2021-10-05 RX ADMIN — HYDROCODONE BITARTRATE AND ACETAMINOPHEN PRN TAB: 10; 325 TABLET ORAL at 12:52

## 2021-10-05 RX ADMIN — Medication SCH ML: at 20:37

## 2021-10-05 NOTE — PN
Subjective:  The patient is now sitting in bed comfortable.  No new complaint.  Nurse reports at time
s he will become agitated, especially at night and becomes combative.



Objective:  Vital Signs:  Blood pressure 106/64, pulse 54, temperature 96.9. 

Heart:  Bradycardic.  Regular rate. 

Chest:  Bilateral crackles. 

Abdomen:  Soft, benign.  Bowel sounds are active. 

Extremities:  No edema.  No cyanosis.  Peripheral pulses are felt. 

Neurological:  Alert, oriented x4.  Grossly intact.



Laboratory Data:  Microbiology; no growth to date on cultures.  Sodium 139, potassium 3.3, chloride 9
7, CO2 35, BUN 27, creatinine 0.94, GFR 77.  White cell count down to 20.7, hemoglobin 9.8, hematocri
t 29.1, and platelets 28.



Assessment And Plan:  

1.Bibasilar pneumonia.  The patient is responding to current antibiotics.

2.Bladder cancer, status post chemo and radiation treatment at this time.  He has been seeing the On
cology people in [place].  He is only getting his radiation here at this time and clinically stable f
rom that standpoint.

3.Thrombocytopenia from malignancy.  No clinical indication of active bleeding at this time.  We rodney
l follow up and transfuse platelets.  We need to care for dehydration.  The patient close to euvolemi
c at this time on the IV fluids.

4.Rest of his medical problems stable.  We will continue the rest of current treatment.

5.Agitation.  We will put him on haloperidol.  Look orders for details.





MFS/MODL

DD:  10/05/2021 14:59:05Voice ID:  [id number]

DT:  10/05/2021 15:11:31Report ID:  [id number]

## 2021-10-05 NOTE — EKG
Test Date:    2021-10-03               Test Time:    14:53:06

Technician:   ESTHER                                    

                                                     

MEASUREMENT RESULTS:                                       

Intervals:                                           

Rate:         83                                     

CA:           164                                    

QRSD:         82                                     

QT:           306                                    

QTc:          359                                    

Axis:                                                

P:            91                                     

CA:           164                                    

QRS:          94                                     

T:            -70                                    

                                                     

INTERPRETIVE STATEMENTS:                                       

                                                     

*** Suspect arm lead reversal, interpretation assumes no reversal

Undetermined rhythm

Low voltage QRS

Anterolateral infarct, age undetermined

Abnormal ECG

Compared to ECG 10/03/2021 12:51:49

Myocardial infarct finding now present

T-wave abnormality no longer present



Electronically Signed On 10-05-21 18:06:39 CDT by Ariel Mai

## 2021-10-05 NOTE — EKG
Test Date:    2021-10-03               Test Time:    12:51:49

Technician:   ESTHER                                    

                                                     

MEASUREMENT RESULTS:                                       

Intervals:                                           

Rate:         89                                     

WA:           146                                    

QRSD:         70                                     

QT:           364                                    

QTc:          442                                    

Axis:                                                

P:            109                                    

WA:           146                                    

QRS:          74                                     

T:            86                                     

                                                     

INTERPRETIVE STATEMENTS:                                       

                                                     

Undetermined rhythm

Low voltage QRS

Nonspecific T wave abnormality

Abnormal ECG

Compared to ECG 02/22/2021 10:34:24

Low QRS voltage now present

Sinus rhythm no longer present

Sinus arrhythmia no longer present

Myocardial infarct finding no longer present

Possible ischemia no longer present

T-wave abnormality still present



Electronically Signed On 10-05-21 18:06:41 CDT by Ariel Mai

## 2021-10-05 NOTE — EKG
Test Date:    2021-10-03               Test Time:    14:54:39

Technician:   ESTHER                                    

                                                     

MEASUREMENT RESULTS:                                       

Intervals:                                           

Rate:         86                                     

ND:           158                                    

QRSD:         94                                     

QT:           382                                    

QTc:          457                                    

Axis:                                                

P:            87                                     

ND:           158                                    

QRS:          91                                     

T:            -38                                    

                                                     

INTERPRETIVE STATEMENTS:                                       

                                                     

Undetermined rhythm

Rightward axis

Pulmonary disease pattern

Abnormal QRS-T angle, consider primary T wave abnormality

Abnormal ECG

Compared to ECG 10/03/2021 14:53:06

Right-axis deviation now present

T-wave abnormality now present

Myocardial infarct finding no longer present



Electronically Signed On 10-05-21 18:06:38 CDT by Ariel Mai

## 2021-10-06 LAB
ANISOCYTOSIS BLD QL: (no result)
BUN BLD-MCNC: 25 MG/DL (ref 7–18)
GLUCOSE SERPLBLD-MCNC: 102 MG/DL (ref 74–106)
HCT VFR BLD CALC: 27.7 % (ref 39.6–49)
LYMPHOCYTES # SPEC AUTO: 0.2 K/UL (ref 0.7–4.9)
MACROCYTES BLD QL: (no result)
MORPHOLOGY BLD-IMP: (no result)
PMV BLD: 10.5 FL (ref 7.6–11.3)
POLYCHROMASIA BLD QL SMEAR: (no result)
POTASSIUM SERPL-SCNC: 4.2 MMOL/L (ref 3.5–5.1)
RBC # BLD: 2.9 M/UL (ref 4.33–5.43)

## 2021-10-06 RX ADMIN — METHYLPREDNISOLONE SODIUM SUCCINATE SCH MG: 40 INJECTION, POWDER, FOR SOLUTION INTRAMUSCULAR; INTRAVENOUS at 16:11

## 2021-10-06 RX ADMIN — HYDROCODONE BITARTRATE AND ACETAMINOPHEN PRN TAB: 10; 325 TABLET ORAL at 16:10

## 2021-10-06 RX ADMIN — Medication SCH: at 19:48

## 2021-10-06 RX ADMIN — SODIUM CHLORIDE SCH MLS: 9 INJECTION, SOLUTION INTRAVENOUS at 16:41

## 2021-10-06 RX ADMIN — METHYLPREDNISOLONE SODIUM SUCCINATE SCH MG: 40 INJECTION, POWDER, FOR SOLUTION INTRAMUSCULAR; INTRAVENOUS at 00:45

## 2021-10-06 RX ADMIN — HYDROCODONE BITARTRATE AND ACETAMINOPHEN PRN TAB: 10; 325 TABLET ORAL at 08:27

## 2021-10-06 RX ADMIN — HYDROCODONE BITARTRATE AND ACETAMINOPHEN PRN TAB: 10; 325 TABLET ORAL at 12:18

## 2021-10-06 RX ADMIN — Medication SCH ML: at 08:28

## 2021-10-06 RX ADMIN — HYDROCODONE BITARTRATE AND ACETAMINOPHEN PRN TAB: 10; 325 TABLET ORAL at 00:45

## 2021-10-06 RX ADMIN — Medication SCH: at 08:28

## 2021-10-06 RX ADMIN — SODIUM CHLORIDE AND POTASSIUM CHLORIDE SCH MLS: .9; .15 SOLUTION INTRAVENOUS at 01:34

## 2021-10-06 RX ADMIN — SODIUM CHLORIDE SCH MLS: 9 INJECTION, SOLUTION INTRAVENOUS at 08:27

## 2021-10-06 RX ADMIN — SODIUM CHLORIDE AND POTASSIUM CHLORIDE SCH MLS: .9; .15 SOLUTION INTRAVENOUS at 12:18

## 2021-10-06 RX ADMIN — SODIUM CHLORIDE SCH MLS: 9 INJECTION, SOLUTION INTRAVENOUS at 12:19

## 2021-10-06 RX ADMIN — SODIUM CHLORIDE SCH MLS: 9 INJECTION, SOLUTION INTRAVENOUS at 00:45

## 2021-10-06 RX ADMIN — ASPIRIN SCH: 81 TABLET, COATED ORAL at 08:19

## 2021-10-06 RX ADMIN — METHYLPREDNISOLONE SODIUM SUCCINATE SCH MG: 40 INJECTION, POWDER, FOR SOLUTION INTRAMUSCULAR; INTRAVENOUS at 08:28

## 2021-10-06 RX ADMIN — HALOPERIDOL LACTATE PRN MG: 5 INJECTION, SOLUTION INTRAMUSCULAR at 01:29

## 2021-10-06 RX ADMIN — PANTOPRAZOLE SODIUM SCH MG: 40 TABLET, DELAYED RELEASE ORAL at 06:19

## 2021-10-06 RX ADMIN — SODIUM CHLORIDE AND POTASSIUM CHLORIDE SCH: .9; .15 SOLUTION INTRAVENOUS at 05:06

## 2021-10-06 NOTE — PN
Subjective:  The patient has no new complaint.



Objective:  Vital Signs:  Blood pressure __________, pulse 68, temperature 97.4. 

Heart:  Regular rate and rhythm. 

Chest:  Bibasilar crackles. 

Abdomen:  Soft, benign.  Bowel sounds are active. 

Extremities:  No edema or cyanosis. 

Neurological:  Alert, oriented x4.  Grossly intact.



Laboratory Data:  White cell count up to 39.5, hemoglobin 8.9, hematocrit 21.7, and platelets 33.  BU
N 25, creatinine 0.85, GFR 87.  Lactic acid 8.3.  Cultures, no growth to date.



Assessment And Plan:  

1.Bibasilar pneumonia with increased white cell count.  We are going to go ahead and add vancomycin 
to his IV antibiotics and continue beta-2 agonist, breathing treatments and steroids.

2.Thrombocytopenia and anemia of chronic illness of malignancy, currently stable and no indication o
f bleeding.

3.Bladder cancer.  The patient is controlled on pain medications now.

4.Agitation is controlled on haloperidol __________ stable.  Look orders for details.





MFS/MODL

DD:  10/06/2021 13:33:26Voice ID:  [id number]

DT:  10/06/2021 14:23:41Report ID:  [id number]

## 2021-10-07 ENCOUNTER — HOSPITAL ENCOUNTER (INPATIENT)
Dept: HOSPITAL 97 - 4TH | Age: 81
LOS: 5 days | DRG: 951 | End: 2021-10-12
Attending: INTERNAL MEDICINE | Admitting: INTERNAL MEDICINE
Payer: COMMERCIAL

## 2021-10-07 VITALS — DIASTOLIC BLOOD PRESSURE: 62 MMHG | SYSTOLIC BLOOD PRESSURE: 106 MMHG | TEMPERATURE: 96.9 F

## 2021-10-07 VITALS — BODY MASS INDEX: 19.8 KG/M2

## 2021-10-07 VITALS — OXYGEN SATURATION: 90 %

## 2021-10-07 DIAGNOSIS — Z51.5: Primary | ICD-10-CM

## 2021-10-07 DIAGNOSIS — C67.9: ICD-10-CM

## 2021-10-07 DIAGNOSIS — I10: ICD-10-CM

## 2021-10-07 DIAGNOSIS — C79.51: ICD-10-CM

## 2021-10-07 RX ADMIN — SCOPALAMINE SCH PAT: 1 PATCH, EXTENDED RELEASE TRANSDERMAL at 17:33

## 2021-10-07 RX ADMIN — SODIUM CHLORIDE SCH: 9 INJECTION, SOLUTION INTRAVENOUS at 08:44

## 2021-10-07 RX ADMIN — SODIUM CHLORIDE AND POTASSIUM CHLORIDE SCH MLS: .9; .15 SOLUTION INTRAVENOUS at 01:51

## 2021-10-07 RX ADMIN — HALOPERIDOL LACTATE PRN MG: 5 INJECTION, SOLUTION INTRAMUSCULAR at 00:28

## 2021-10-07 RX ADMIN — Medication SCH: at 08:45

## 2021-10-07 RX ADMIN — HYDROCODONE BITARTRATE AND ACETAMINOPHEN PRN TAB: 10; 325 TABLET ORAL at 10:52

## 2021-10-07 RX ADMIN — MORPHINE SULFATE PRN MG: 10 SOLUTION ORAL at 21:00

## 2021-10-07 RX ADMIN — LORAZEPAM PRN MG: 1 TABLET ORAL at 17:33

## 2021-10-07 RX ADMIN — SODIUM CHLORIDE SCH MLS: 9 INJECTION, SOLUTION INTRAVENOUS at 00:28

## 2021-10-07 RX ADMIN — SODIUM CHLORIDE SCH MLS: 9 INJECTION, SOLUTION INTRAVENOUS at 12:19

## 2021-10-07 RX ADMIN — METHYLPREDNISOLONE SODIUM SUCCINATE SCH: 40 INJECTION, POWDER, FOR SOLUTION INTRAMUSCULAR; INTRAVENOUS at 08:45

## 2021-10-07 RX ADMIN — METHYLPREDNISOLONE SODIUM SUCCINATE SCH MG: 40 INJECTION, POWDER, FOR SOLUTION INTRAMUSCULAR; INTRAVENOUS at 00:28

## 2021-10-07 RX ADMIN — LORAZEPAM PRN MG: 1 TABLET ORAL at 21:09

## 2021-10-07 RX ADMIN — HYDROCODONE BITARTRATE AND ACETAMINOPHEN PRN TAB: 10; 325 TABLET ORAL at 00:27

## 2021-10-07 RX ADMIN — HYDROCODONE BITARTRATE AND ACETAMINOPHEN PRN TAB: 10; 325 TABLET ORAL at 13:04

## 2021-10-07 RX ADMIN — FENTANYL SCH MCG: 75 PATCH, EXTENDED RELEASE TRANSDERMAL at 17:31

## 2021-10-07 RX ADMIN — MORPHINE SULFATE PRN MG: 10 SOLUTION ORAL at 18:11

## 2021-10-07 RX ADMIN — HALOPERIDOL LACTATE PRN MG: 5 INJECTION, SOLUTION INTRAMUSCULAR at 10:45

## 2021-10-07 RX ADMIN — SODIUM CHLORIDE AND POTASSIUM CHLORIDE SCH MLS: .9; .15 SOLUTION INTRAVENOUS at 12:19

## 2021-10-07 RX ADMIN — ASPIRIN SCH: 81 TABLET, COATED ORAL at 08:45

## 2021-10-07 RX ADMIN — FENTANYL SCH: 75 PATCH, EXTENDED RELEASE TRANSDERMAL at 08:45

## 2021-10-07 RX ADMIN — PANTOPRAZOLE SODIUM SCH: 40 TABLET, DELAYED RELEASE ORAL at 06:14

## 2021-10-07 NOTE — PN
Subjective:  The patient has been combative and refusing treatments, and blood drawn.  He wants to be
 out of here.



Objective:  Vital Signs:  Blood pressure 106/62, pulse 91, temperature 96.9. 

Heart:  Regular rate and rhythm. 

Chest:  Bibasilar crackles. 

Abdomen:  Soft, benign. 

Neurological:  Alert, oriented.  He knows he is in the hospital.  He knows me and he is grossly intac
t. 

Extremities:  No edema.  No cyanosis.



Laboratory Data:  CBC and chem-7 not drawn because the patient refused.



Assessment And Plan:  I have talked with the patient and his son about hospice care and his terminal 
condition for bladder cancer.  They agreed to have a hospice consult to discuss and see to treat the 
patient for comfort only.  For now, we are going to keep giving the patient the treatment and managem
ent as delineated.





MFS/MODL

DD:  10/07/2021 12:42:52Voice ID:  [id number]

DT:  10/07/2021 16:38:16Report ID:  [id number]

## 2021-10-08 RX ADMIN — MORPHINE SULFATE PRN MG: 10 SOLUTION ORAL at 02:01

## 2021-10-08 RX ADMIN — LORAZEPAM PRN MG: 1 TABLET ORAL at 09:15

## 2021-10-08 RX ADMIN — MORPHINE SULFATE PRN MG: 10 SOLUTION ORAL at 09:15

## 2021-10-08 RX ADMIN — MORPHINE SULFATE PRN MG: 10 SOLUTION ORAL at 17:16

## 2021-10-09 RX ADMIN — MORPHINE SULFATE PRN MG: 10 SOLUTION ORAL at 08:54

## 2021-10-09 RX ADMIN — LORAZEPAM PRN MG: 1 TABLET ORAL at 23:50

## 2021-10-09 RX ADMIN — LORAZEPAM PRN MG: 1 TABLET ORAL at 22:01

## 2021-10-09 RX ADMIN — MORPHINE SULFATE PRN MG: 10 SOLUTION ORAL at 19:57

## 2021-10-09 RX ADMIN — MORPHINE SULFATE PRN MG: 10 SOLUTION ORAL at 23:49

## 2021-10-09 RX ADMIN — MORPHINE SULFATE PRN MG: 10 SOLUTION ORAL at 15:44

## 2021-10-09 RX ADMIN — MORPHINE SULFATE PRN MG: 10 SOLUTION ORAL at 22:01

## 2021-10-10 RX ADMIN — MORPHINE SULFATE PRN MG: 10 SOLUTION ORAL at 05:15

## 2021-10-10 RX ADMIN — SCOPALAMINE SCH PAT: 1 PATCH, EXTENDED RELEASE TRANSDERMAL at 17:00

## 2021-10-10 RX ADMIN — LORAZEPAM PRN MG: 1 TABLET ORAL at 05:15

## 2021-10-10 RX ADMIN — FENTANYL SCH MCG: 75 PATCH, EXTENDED RELEASE TRANSDERMAL at 18:11

## 2021-10-11 VITALS — SYSTOLIC BLOOD PRESSURE: 74 MMHG | DIASTOLIC BLOOD PRESSURE: 50 MMHG

## 2021-10-11 VITALS — OXYGEN SATURATION: 90 %

## 2021-10-11 VITALS — TEMPERATURE: 97.1 F
